# Patient Record
Sex: MALE | Race: WHITE | Employment: OTHER | URBAN - METROPOLITAN AREA
[De-identification: names, ages, dates, MRNs, and addresses within clinical notes are randomized per-mention and may not be internally consistent; named-entity substitution may affect disease eponyms.]

---

## 2017-01-01 ENCOUNTER — HOME CARE VISIT (OUTPATIENT)
Dept: HOME HEALTH SERVICES | Facility: HOME HEALTH | Age: 82
End: 2017-01-01
Payer: MEDICARE

## 2017-01-01 ENCOUNTER — HOSPITAL ENCOUNTER (OUTPATIENT)
Dept: INFUSION THERAPY | Age: 82
Discharge: HOME | End: 2017-01-21
Payer: MEDICARE

## 2017-01-01 ENCOUNTER — DOCUMENTATION ONLY (OUTPATIENT)
Dept: HEMATOLOGY | Age: 82
End: 2017-01-01

## 2017-01-01 ENCOUNTER — PATIENT OUTREACH (OUTPATIENT)
Dept: CASE MANAGEMENT | Age: 82
End: 2017-01-01

## 2017-01-01 ENCOUNTER — HOSPITAL ENCOUNTER (OUTPATIENT)
Dept: INFUSION THERAPY | Age: 82
Discharge: HOME | End: 2017-02-12
Payer: MEDICARE

## 2017-01-01 ENCOUNTER — HOSPITAL ENCOUNTER (INPATIENT)
Age: 82
LOS: 7 days | Discharge: HOME HEALTH | DRG: 374 | End: 2017-02-11
Attending: INTERNAL MEDICINE | Admitting: INTERNAL MEDICINE
Payer: MEDICARE

## 2017-01-01 ENCOUNTER — HOME HEALTH ADMISSION (OUTPATIENT)
Dept: HOME HEALTH SERVICES | Facility: HOME HEALTH | Age: 82
End: 2017-01-01
Payer: MEDICARE

## 2017-01-01 ENCOUNTER — HOSPITAL ENCOUNTER (OUTPATIENT)
Dept: LAB | Age: 82
Discharge: HOME | End: 2017-01-20
Payer: MEDICARE

## 2017-01-01 ENCOUNTER — HOSPITAL ENCOUNTER (OUTPATIENT)
Dept: INTERVENTIONAL RADIOLOGY/VASCULAR | Age: 82
Discharge: HOME | End: 2017-02-13
Payer: MEDICARE

## 2017-01-01 ENCOUNTER — HOME CARE VISIT (OUTPATIENT)
Dept: SCHEDULING | Facility: HOME HEALTH | Age: 82
End: 2017-01-01
Payer: MEDICARE

## 2017-01-01 ENCOUNTER — APPOINTMENT (OUTPATIENT)
Dept: CT IMAGING | Age: 82
DRG: 374 | End: 2017-01-01
Attending: RADIOLOGY
Payer: MEDICARE

## 2017-01-01 ENCOUNTER — HOSPITAL ENCOUNTER (INPATIENT)
Age: 82
LOS: 3 days | Discharge: HOSPICE | DRG: 435 | End: 2017-02-24
Attending: INTERNAL MEDICINE | Admitting: INTERNAL MEDICINE
Payer: MEDICARE

## 2017-01-01 ENCOUNTER — APPOINTMENT (OUTPATIENT)
Dept: GENERAL RADIOLOGY | Age: 82
DRG: 374 | End: 2017-01-01
Attending: INTERNAL MEDICINE
Payer: MEDICARE

## 2017-01-01 ENCOUNTER — ANESTHESIA (OUTPATIENT)
Dept: ENDOSCOPY | Age: 82
DRG: 374 | End: 2017-01-01
Payer: MEDICARE

## 2017-01-01 ENCOUNTER — APPOINTMENT (OUTPATIENT)
Dept: INTERVENTIONAL RADIOLOGY/VASCULAR | Age: 82
DRG: 374 | End: 2017-01-01
Attending: RADIOLOGY
Payer: MEDICARE

## 2017-01-01 ENCOUNTER — HOSPITAL ENCOUNTER (OUTPATIENT)
Dept: INFUSION THERAPY | Age: 82
Discharge: HOME | End: 2017-01-02
Payer: MEDICARE

## 2017-01-01 ENCOUNTER — HOSPITAL ENCOUNTER (OUTPATIENT)
Dept: INFUSION THERAPY | Age: 82
Discharge: HOME | End: 2017-01-20
Payer: MEDICARE

## 2017-01-01 ENCOUNTER — APPOINTMENT (OUTPATIENT)
Dept: INFUSION THERAPY | Age: 82
End: 2017-01-01

## 2017-01-01 ENCOUNTER — HOSPITAL ENCOUNTER (OUTPATIENT)
Dept: INFUSION THERAPY | Age: 82
End: 2017-01-01

## 2017-01-01 ENCOUNTER — APPOINTMENT (OUTPATIENT)
Dept: CT IMAGING | Age: 82
DRG: 374 | End: 2017-01-01
Attending: INTERNAL MEDICINE
Payer: MEDICARE

## 2017-01-01 ENCOUNTER — HOSPITAL ENCOUNTER (INPATIENT)
Age: 82
LOS: 2 days | End: 2017-02-26
Attending: INTERNAL MEDICINE | Admitting: INTERNAL MEDICINE

## 2017-01-01 ENCOUNTER — ANESTHESIA (OUTPATIENT)
Dept: SURGERY | Age: 82
DRG: 435 | End: 2017-01-01
Payer: MEDICARE

## 2017-01-01 ENCOUNTER — HOSPITAL ENCOUNTER (OUTPATIENT)
Dept: LAB | Age: 82
Discharge: HOME | DRG: 374 | End: 2017-02-03
Payer: MEDICARE

## 2017-01-01 ENCOUNTER — ANESTHESIA EVENT (OUTPATIENT)
Dept: ENDOSCOPY | Age: 82
DRG: 374 | End: 2017-01-01
Payer: MEDICARE

## 2017-01-01 ENCOUNTER — ANESTHESIA EVENT (OUTPATIENT)
Dept: SURGERY | Age: 82
DRG: 435 | End: 2017-01-01
Payer: MEDICARE

## 2017-01-01 ENCOUNTER — APPOINTMENT (OUTPATIENT)
Dept: GENERAL RADIOLOGY | Age: 82
DRG: 435 | End: 2017-01-01
Attending: NURSE PRACTITIONER
Payer: MEDICARE

## 2017-01-01 ENCOUNTER — HOSPITAL ENCOUNTER (OUTPATIENT)
Dept: LAB | Age: 82
Discharge: HOME | DRG: 435 | End: 2017-02-15
Payer: MEDICARE

## 2017-01-01 ENCOUNTER — APPOINTMENT (OUTPATIENT)
Dept: NUCLEAR MEDICINE | Age: 82
DRG: 374 | End: 2017-01-01
Attending: NURSE PRACTITIONER
Payer: MEDICARE

## 2017-01-01 ENCOUNTER — HOSPITAL ENCOUNTER (INPATIENT)
Dept: INTERVENTIONAL RADIOLOGY/VASCULAR | Age: 82
LOS: 1 days | Discharge: HOME | DRG: 435 | End: 2017-02-18
Attending: INTERNAL MEDICINE | Admitting: INTERNAL MEDICINE
Payer: MEDICARE

## 2017-01-01 ENCOUNTER — HOSPITAL ENCOUNTER (OUTPATIENT)
Dept: INTERVENTIONAL RADIOLOGY/VASCULAR | Age: 82
Discharge: HOME | End: 2017-02-14

## 2017-01-01 ENCOUNTER — HOSPICE ADMISSION (OUTPATIENT)
Dept: HOSPICE | Facility: HOSPICE | Age: 82
End: 2017-01-01
Payer: MEDICARE

## 2017-01-01 ENCOUNTER — HOSPITAL ENCOUNTER (OUTPATIENT)
Dept: INFUSION THERAPY | Age: 82
Discharge: HOME | End: 2017-02-15
Payer: MEDICARE

## 2017-01-01 ENCOUNTER — HOSPITAL ENCOUNTER (OUTPATIENT)
Dept: INFUSION THERAPY | Age: 82
Discharge: HOME | End: 2017-01-05
Payer: MEDICARE

## 2017-01-01 ENCOUNTER — HOSPITAL ENCOUNTER (OUTPATIENT)
Dept: LAB | Age: 82
Discharge: HOME | End: 2017-01-05
Payer: MEDICARE

## 2017-01-01 ENCOUNTER — APPOINTMENT (OUTPATIENT)
Dept: INTERVENTIONAL RADIOLOGY/VASCULAR | Age: 82
DRG: 435 | End: 2017-01-01
Payer: MEDICARE

## 2017-01-01 VITALS
WEIGHT: 160.6 LBS | TEMPERATURE: 98.1 F | HEIGHT: 73 IN | DIASTOLIC BLOOD PRESSURE: 90 MMHG | HEART RATE: 99 BPM | RESPIRATION RATE: 16 BRPM | SYSTOLIC BLOOD PRESSURE: 145 MMHG | BODY MASS INDEX: 21.29 KG/M2 | OXYGEN SATURATION: 96 %

## 2017-01-01 VITALS
DIASTOLIC BLOOD PRESSURE: 80 MMHG | OXYGEN SATURATION: 99 % | RESPIRATION RATE: 20 BRPM | TEMPERATURE: 97.7 F | WEIGHT: 157 LBS | BODY MASS INDEX: 20.71 KG/M2 | SYSTOLIC BLOOD PRESSURE: 155 MMHG | HEART RATE: 89 BPM

## 2017-01-01 VITALS
RESPIRATION RATE: 18 BRPM | SYSTOLIC BLOOD PRESSURE: 152 MMHG | OXYGEN SATURATION: 95 % | HEART RATE: 60 BPM | DIASTOLIC BLOOD PRESSURE: 75 MMHG | WEIGHT: 162.2 LBS | TEMPERATURE: 97.7 F | BODY MASS INDEX: 21.4 KG/M2

## 2017-01-01 VITALS
SYSTOLIC BLOOD PRESSURE: 161 MMHG | RESPIRATION RATE: 24 BRPM | HEIGHT: 73 IN | HEART RATE: 122 BPM | TEMPERATURE: 97 F | DIASTOLIC BLOOD PRESSURE: 76 MMHG

## 2017-01-01 VITALS
TEMPERATURE: 98.2 F | DIASTOLIC BLOOD PRESSURE: 70 MMHG | WEIGHT: 159.8 LBS | HEART RATE: 68 BPM | RESPIRATION RATE: 16 BRPM | SYSTOLIC BLOOD PRESSURE: 122 MMHG | OXYGEN SATURATION: 100 % | BODY MASS INDEX: 21.08 KG/M2

## 2017-01-01 VITALS
HEIGHT: 73 IN | HEART RATE: 58 BPM | BODY MASS INDEX: 21.2 KG/M2 | TEMPERATURE: 97.4 F | DIASTOLIC BLOOD PRESSURE: 55 MMHG | WEIGHT: 160 LBS | RESPIRATION RATE: 18 BRPM | OXYGEN SATURATION: 97 % | SYSTOLIC BLOOD PRESSURE: 121 MMHG

## 2017-01-01 VITALS
DIASTOLIC BLOOD PRESSURE: 65 MMHG | HEART RATE: 64 BPM | RESPIRATION RATE: 18 BRPM | TEMPERATURE: 98.8 F | OXYGEN SATURATION: 99 % | SYSTOLIC BLOOD PRESSURE: 142 MMHG

## 2017-01-01 VITALS
RESPIRATION RATE: 18 BRPM | DIASTOLIC BLOOD PRESSURE: 70 MMHG | HEART RATE: 70 BPM | SYSTOLIC BLOOD PRESSURE: 128 MMHG | TEMPERATURE: 97 F

## 2017-01-01 VITALS
HEART RATE: 78 BPM | SYSTOLIC BLOOD PRESSURE: 140 MMHG | RESPIRATION RATE: 18 BRPM | DIASTOLIC BLOOD PRESSURE: 80 MMHG | OXYGEN SATURATION: 95 %

## 2017-01-01 VITALS — SYSTOLIC BLOOD PRESSURE: 100 MMHG | DIASTOLIC BLOOD PRESSURE: 70 MMHG

## 2017-01-01 VITALS — BODY MASS INDEX: 20.92 KG/M2 | HEIGHT: 73 IN

## 2017-01-01 DIAGNOSIS — C80.1 CANCER (HCC): ICD-10-CM

## 2017-01-01 DIAGNOSIS — C25.0 MALIGNANT NEOPLASM OF HEAD OF PANCREAS (HCC): ICD-10-CM

## 2017-01-01 DIAGNOSIS — K83.1 BILIARY OBSTRUCTION DUE TO CANCER (HCC): ICD-10-CM

## 2017-01-01 DIAGNOSIS — D70.1 CHEMOTHERAPY INDUCED NEUTROPENIA (HCC): ICD-10-CM

## 2017-01-01 DIAGNOSIS — R52 PAIN: ICD-10-CM

## 2017-01-01 DIAGNOSIS — R11.2 INTRACTABLE VOMITING WITH NAUSEA, UNSPECIFIED VOMITING TYPE: ICD-10-CM

## 2017-01-01 DIAGNOSIS — T45.1X5A CHEMOTHERAPY INDUCED NEUTROPENIA (HCC): ICD-10-CM

## 2017-01-01 DIAGNOSIS — E83.42 HYPOMAGNESEMIA: ICD-10-CM

## 2017-01-01 DIAGNOSIS — G47.00 INSOMNIA, UNSPECIFIED TYPE: ICD-10-CM

## 2017-01-01 DIAGNOSIS — R78.81 GRAM-POSITIVE BACTEREMIA: ICD-10-CM

## 2017-01-01 DIAGNOSIS — D50.9 IRON DEFICIENCY ANEMIA, UNSPECIFIED IRON DEFICIENCY ANEMIA TYPE: Chronic | ICD-10-CM

## 2017-01-01 DIAGNOSIS — E87.8 DISORDERS OF FLUID, ELECTROLYTE, AND ACID-BASE BALANCE: ICD-10-CM

## 2017-01-01 DIAGNOSIS — C78.7 PANCREATIC CANCER METASTASIZED TO LIVER (HCC): Chronic | ICD-10-CM

## 2017-01-01 DIAGNOSIS — C25.9 PANCREATIC CANCER METASTASIZED TO LIVER (HCC): Chronic | ICD-10-CM

## 2017-01-01 DIAGNOSIS — K75.0 HEPATIC ABSCESS: ICD-10-CM

## 2017-01-01 DIAGNOSIS — C25.9 PANCREATIC CANCER (HCC): ICD-10-CM

## 2017-01-01 DIAGNOSIS — C80.1 BILIARY OBSTRUCTION DUE TO CANCER (HCC): ICD-10-CM

## 2017-01-01 LAB
ABO + RH BLD: NORMAL
ALBUMIN SERPL BCP-MCNC: 1.4 G/DL (ref 3.2–4.6)
ALBUMIN SERPL BCP-MCNC: 1.4 G/DL (ref 3.2–4.6)
ALBUMIN SERPL BCP-MCNC: 1.5 G/DL (ref 3.2–4.6)
ALBUMIN SERPL BCP-MCNC: 1.6 G/DL (ref 3.2–4.6)
ALBUMIN SERPL BCP-MCNC: 1.7 G/DL (ref 3.2–4.6)
ALBUMIN SERPL BCP-MCNC: 1.7 G/DL (ref 3.2–4.6)
ALBUMIN SERPL BCP-MCNC: 1.8 G/DL (ref 3.2–4.6)
ALBUMIN SERPL BCP-MCNC: 1.8 G/DL (ref 3.2–4.6)
ALBUMIN SERPL BCP-MCNC: 1.9 G/DL (ref 3.2–4.6)
ALBUMIN SERPL BCP-MCNC: 2 G/DL (ref 3.2–4.6)
ALBUMIN SERPL BCP-MCNC: 2.1 G/DL (ref 3.2–4.6)
ALBUMIN SERPL BCP-MCNC: 2.2 G/DL (ref 3.2–4.6)
ALBUMIN SERPL BCP-MCNC: 2.2 G/DL (ref 3.2–4.6)
ALBUMIN SERPL BCP-MCNC: 2.4 G/DL (ref 3.2–4.6)
ALBUMIN SERPL BCP-MCNC: 2.5 G/DL (ref 3.2–4.6)
ALBUMIN SERPL BCP-MCNC: 2.5 G/DL (ref 3.2–4.6)
ALBUMIN/GLOB SERPL: 0.3 {RATIO} (ref 1.2–3.5)
ALBUMIN/GLOB SERPL: 0.3 {RATIO} (ref 1.2–3.5)
ALBUMIN/GLOB SERPL: 0.4 {RATIO} (ref 1.2–3.5)
ALBUMIN/GLOB SERPL: 0.5 {RATIO} (ref 1.2–3.5)
ALBUMIN/GLOB SERPL: 0.6 {RATIO} (ref 1.2–3.5)
ALP SERPL-CCNC: 1052 U/L (ref 50–136)
ALP SERPL-CCNC: 270 U/L (ref 50–136)
ALP SERPL-CCNC: 277 U/L (ref 50–136)
ALP SERPL-CCNC: 394 U/L (ref 50–136)
ALP SERPL-CCNC: 419 U/L (ref 50–136)
ALP SERPL-CCNC: 451 U/L (ref 50–136)
ALP SERPL-CCNC: 515 U/L (ref 50–136)
ALP SERPL-CCNC: 540 U/L (ref 50–136)
ALP SERPL-CCNC: 549 U/L (ref 50–136)
ALP SERPL-CCNC: 553 U/L (ref 50–136)
ALP SERPL-CCNC: 560 U/L (ref 50–136)
ALP SERPL-CCNC: 568 U/L (ref 50–136)
ALP SERPL-CCNC: 679 U/L (ref 50–136)
ALP SERPL-CCNC: 708 U/L (ref 50–136)
ALP SERPL-CCNC: 767 U/L (ref 50–136)
ALP SERPL-CCNC: 809 U/L (ref 50–136)
ALP SERPL-CCNC: 833 U/L (ref 50–136)
ALP SERPL-CCNC: 903 U/L (ref 50–136)
ALP SERPL-CCNC: 927 U/L (ref 50–136)
ALP SERPL-CCNC: 987 U/L (ref 50–136)
ALT SERPL-CCNC: 21 U/L (ref 12–65)
ALT SERPL-CCNC: 23 U/L (ref 12–65)
ALT SERPL-CCNC: 24 U/L (ref 12–65)
ALT SERPL-CCNC: 25 U/L (ref 12–65)
ALT SERPL-CCNC: 25 U/L (ref 12–65)
ALT SERPL-CCNC: 29 U/L (ref 12–65)
ALT SERPL-CCNC: 32 U/L (ref 12–65)
ALT SERPL-CCNC: 33 U/L (ref 12–65)
ALT SERPL-CCNC: 35 U/L (ref 12–65)
ALT SERPL-CCNC: 35 U/L (ref 12–65)
ALT SERPL-CCNC: 37 U/L (ref 12–65)
ALT SERPL-CCNC: 39 U/L (ref 12–65)
ALT SERPL-CCNC: 42 U/L (ref 12–65)
ALT SERPL-CCNC: 49 U/L (ref 12–65)
ALT SERPL-CCNC: 55 U/L (ref 12–65)
ALT SERPL-CCNC: 63 U/L (ref 12–65)
ALT SERPL-CCNC: 75 U/L (ref 12–65)
ALT SERPL-CCNC: 89 U/L (ref 12–65)
ANION GAP BLD CALC-SCNC: 10 MMOL/L (ref 7–16)
ANION GAP BLD CALC-SCNC: 11 MMOL/L (ref 7–16)
ANION GAP BLD CALC-SCNC: 14 MMOL/L (ref 7–16)
ANION GAP BLD CALC-SCNC: 6 MMOL/L (ref 7–16)
ANION GAP BLD CALC-SCNC: 6 MMOL/L (ref 7–16)
ANION GAP BLD CALC-SCNC: 9 MMOL/L (ref 7–16)
APPEARANCE FLD: NORMAL
APPEARANCE UR: CLEAR
APTT PPP: 25.7 SEC (ref 23.5–31.7)
APTT PPP: 32.8 SEC (ref 23.5–31.7)
AST SERPL W P-5'-P-CCNC: 18 U/L (ref 15–37)
AST SERPL W P-5'-P-CCNC: 23 U/L (ref 15–37)
AST SERPL W P-5'-P-CCNC: 23 U/L (ref 15–37)
AST SERPL W P-5'-P-CCNC: 25 U/L (ref 15–37)
AST SERPL W P-5'-P-CCNC: 26 U/L (ref 15–37)
AST SERPL W P-5'-P-CCNC: 28 U/L (ref 15–37)
AST SERPL W P-5'-P-CCNC: 31 U/L (ref 15–37)
AST SERPL W P-5'-P-CCNC: 35 U/L (ref 15–37)
AST SERPL W P-5'-P-CCNC: 38 U/L (ref 15–37)
AST SERPL W P-5'-P-CCNC: 39 U/L (ref 15–37)
AST SERPL W P-5'-P-CCNC: 40 U/L (ref 15–37)
AST SERPL W P-5'-P-CCNC: 40 U/L (ref 15–37)
AST SERPL W P-5'-P-CCNC: 44 U/L (ref 15–37)
AST SERPL W P-5'-P-CCNC: 47 U/L (ref 15–37)
AST SERPL W P-5'-P-CCNC: 51 U/L (ref 15–37)
AST SERPL W P-5'-P-CCNC: 51 U/L (ref 15–37)
AST SERPL W P-5'-P-CCNC: 59 U/L (ref 15–37)
AST SERPL W P-5'-P-CCNC: 60 U/L (ref 15–37)
AST SERPL W P-5'-P-CCNC: 88 U/L (ref 15–37)
AST SERPL W P-5'-P-CCNC: 89 U/L (ref 15–37)
ATRIAL RATE: 65 BPM
BACTERIA SPEC CULT: ABNORMAL
BACTERIA SPEC CULT: NEGATIVE
BACTERIA SPEC CULT: NORMAL
BACTERIA URNS QL MICRO: 0 /HPF
BASOPHILS # BLD AUTO: 0 K/UL (ref 0–0.2)
BASOPHILS # BLD AUTO: 0.1 K/UL (ref 0–0.2)
BASOPHILS # BLD: 0 % (ref 0–2)
BILIRUB DIRECT SERPL-MCNC: 0.6 MG/DL
BILIRUB DIRECT SERPL-MCNC: 3.4 MG/DL
BILIRUB INDIRECT SERPL-MCNC: 0.8 MG/DL (ref 0–1.1)
BILIRUB SERPL-MCNC: 0.4 MG/DL (ref 0.2–1.1)
BILIRUB SERPL-MCNC: 0.5 MG/DL (ref 0.2–1.1)
BILIRUB SERPL-MCNC: 0.7 MG/DL (ref 0.2–1.1)
BILIRUB SERPL-MCNC: 0.7 MG/DL (ref 0.2–1.1)
BILIRUB SERPL-MCNC: 0.9 MG/DL (ref 0.2–1.1)
BILIRUB SERPL-MCNC: 0.9 MG/DL (ref 0.2–1.1)
BILIRUB SERPL-MCNC: 1.1 MG/DL (ref 0.2–1.1)
BILIRUB SERPL-MCNC: 1.2 MG/DL (ref 0.2–1.1)
BILIRUB SERPL-MCNC: 1.2 MG/DL (ref 0.2–1.1)
BILIRUB SERPL-MCNC: 1.5 MG/DL (ref 0.2–1.1)
BILIRUB SERPL-MCNC: 1.6 MG/DL (ref 0.2–1.1)
BILIRUB SERPL-MCNC: 1.6 MG/DL (ref 0.2–1.1)
BILIRUB SERPL-MCNC: 1.8 MG/DL (ref 0.2–1.1)
BILIRUB SERPL-MCNC: 2 MG/DL (ref 0.2–1.1)
BILIRUB SERPL-MCNC: 2.1 MG/DL (ref 0.2–1.1)
BILIRUB SERPL-MCNC: 3.2 MG/DL (ref 0.2–1.1)
BILIRUB SERPL-MCNC: 4.2 MG/DL (ref 0.2–1.1)
BILIRUB SERPL-MCNC: 4.2 MG/DL (ref 0.2–1.1)
BILIRUB SERPL-MCNC: 6.1 MG/DL (ref 0.2–1.1)
BILIRUB UR QL: NEGATIVE
BLD PROD TYP BPU: NORMAL
BLOOD BANK CMNT PATIENT-IMP: NORMAL
BLOOD GROUP ANTIBODIES SERPL: NORMAL
BPU ID: NORMAL
BUN SERPL-MCNC: 18 MG/DL (ref 8–23)
BUN SERPL-MCNC: 18 MG/DL (ref 8–23)
BUN SERPL-MCNC: 19 MG/DL (ref 8–23)
BUN SERPL-MCNC: 20 MG/DL (ref 8–23)
BUN SERPL-MCNC: 22 MG/DL (ref 8–23)
BUN SERPL-MCNC: 24 MG/DL (ref 8–23)
BUN SERPL-MCNC: 27 MG/DL (ref 8–23)
BUN SERPL-MCNC: 27 MG/DL (ref 8–23)
BUN SERPL-MCNC: 29 MG/DL (ref 8–23)
BUN SERPL-MCNC: 29 MG/DL (ref 8–23)
BUN SERPL-MCNC: 30 MG/DL (ref 8–23)
BUN SERPL-MCNC: 30 MG/DL (ref 8–23)
BUN SERPL-MCNC: 31 MG/DL (ref 8–23)
BUN SERPL-MCNC: 31 MG/DL (ref 8–23)
BUN SERPL-MCNC: 32 MG/DL (ref 8–23)
BUN SERPL-MCNC: 32 MG/DL (ref 8–23)
BUN SERPL-MCNC: 33 MG/DL (ref 8–23)
BUN SERPL-MCNC: 33 MG/DL (ref 8–23)
BUN SERPL-MCNC: 34 MG/DL (ref 8–23)
BUN SERPL-MCNC: 35 MG/DL (ref 8–23)
BUN SERPL-MCNC: 37 MG/DL (ref 8–23)
CALCIUM SERPL-MCNC: 6.4 MG/DL (ref 8.3–10.4)
CALCIUM SERPL-MCNC: 7.6 MG/DL (ref 8.3–10.4)
CALCIUM SERPL-MCNC: 7.8 MG/DL (ref 8.3–10.4)
CALCIUM SERPL-MCNC: 7.8 MG/DL (ref 8.3–10.4)
CALCIUM SERPL-MCNC: 7.9 MG/DL (ref 8.3–10.4)
CALCIUM SERPL-MCNC: 7.9 MG/DL (ref 8.3–10.4)
CALCIUM SERPL-MCNC: 8 MG/DL (ref 8.3–10.4)
CALCIUM SERPL-MCNC: 8.1 MG/DL (ref 8.3–10.4)
CALCIUM SERPL-MCNC: 8.1 MG/DL (ref 8.3–10.4)
CALCIUM SERPL-MCNC: 8.2 MG/DL (ref 8.3–10.4)
CALCIUM SERPL-MCNC: 8.3 MG/DL (ref 8.3–10.4)
CALCIUM SERPL-MCNC: 8.5 MG/DL (ref 8.3–10.4)
CALCIUM SERPL-MCNC: 8.6 MG/DL (ref 8.3–10.4)
CALCIUM SERPL-MCNC: 8.6 MG/DL (ref 8.3–10.4)
CALCIUM SERPL-MCNC: 8.7 MG/DL (ref 8.3–10.4)
CALCIUM SERPL-MCNC: 8.8 MG/DL (ref 8.3–10.4)
CALCIUM SERPL-MCNC: 8.9 MG/DL (ref 8.3–10.4)
CALCULATED P AXIS, ECG09: 42 DEGREES
CALCULATED R AXIS, ECG10: 41 DEGREES
CALCULATED T AXIS, ECG11: 61 DEGREES
CANCER AG19-9 SERPL-ACNC: 5991.9 U/ML (ref 2–37)
CANCER AG19-9 SERPL-ACNC: 7458.8 U/ML (ref 2–37)
CANCER AG19-9 SERPL-ACNC: 8493 U/ML (ref 2–37)
CANCER AG19-9 SERPL-ACNC: ABNORMAL U/ML (ref 2–37)
CASTS URNS QL MICRO: ABNORMAL /LPF
CHLORIDE SERPL-SCNC: 105 MMOL/L (ref 98–107)
CHLORIDE SERPL-SCNC: 106 MMOL/L (ref 98–107)
CHLORIDE SERPL-SCNC: 107 MMOL/L (ref 98–107)
CHLORIDE SERPL-SCNC: 108 MMOL/L (ref 98–107)
CHLORIDE SERPL-SCNC: 109 MMOL/L (ref 98–107)
CHLORIDE SERPL-SCNC: 110 MMOL/L (ref 98–107)
CHLORIDE SERPL-SCNC: 111 MMOL/L (ref 98–107)
CHLORIDE SERPL-SCNC: 112 MMOL/L (ref 98–107)
CHLORIDE SERPL-SCNC: 113 MMOL/L (ref 98–107)
CHLORIDE SERPL-SCNC: 113 MMOL/L (ref 98–107)
CO2 SERPL-SCNC: 20 MMOL/L (ref 21–32)
CO2 SERPL-SCNC: 21 MMOL/L (ref 21–32)
CO2 SERPL-SCNC: 22 MMOL/L (ref 21–32)
CO2 SERPL-SCNC: 22 MMOL/L (ref 23–32)
CO2 SERPL-SCNC: 23 MMOL/L (ref 21–32)
CO2 SERPL-SCNC: 24 MMOL/L (ref 21–32)
CO2 SERPL-SCNC: 24 MMOL/L (ref 23–32)
CO2 SERPL-SCNC: 25 MMOL/L (ref 23–32)
CO2 SERPL-SCNC: 25 MMOL/L (ref 23–32)
COLOR FLD: YELLOW
COLOR UR: ABNORMAL
CREAT SERPL-MCNC: 0.71 MG/DL (ref 0.8–1.5)
CREAT SERPL-MCNC: 0.71 MG/DL (ref 0.8–1.5)
CREAT SERPL-MCNC: 0.74 MG/DL (ref 0.8–1.5)
CREAT SERPL-MCNC: 0.76 MG/DL (ref 0.8–1.5)
CREAT SERPL-MCNC: 0.79 MG/DL (ref 0.8–1.5)
CREAT SERPL-MCNC: 0.82 MG/DL (ref 0.8–1.5)
CREAT SERPL-MCNC: 0.84 MG/DL (ref 0.8–1.5)
CREAT SERPL-MCNC: 0.86 MG/DL (ref 0.8–1.5)
CREAT SERPL-MCNC: 0.94 MG/DL (ref 0.8–1.5)
CREAT SERPL-MCNC: 0.95 MG/DL (ref 0.8–1.5)
CREAT SERPL-MCNC: 0.96 MG/DL (ref 0.8–1.5)
CREAT SERPL-MCNC: 0.96 MG/DL (ref 0.8–1.5)
CREAT SERPL-MCNC: 0.99 MG/DL (ref 0.8–1.5)
CREAT SERPL-MCNC: 1 MG/DL (ref 0.8–1.5)
CREAT SERPL-MCNC: 1.01 MG/DL (ref 0.8–1.5)
CREAT SERPL-MCNC: 1.01 MG/DL (ref 0.8–1.5)
CREAT SERPL-MCNC: 1.03 MG/DL (ref 0.8–1.5)
CREAT SERPL-MCNC: 1.12 MG/DL (ref 0.8–1.5)
CREAT SERPL-MCNC: 1.14 MG/DL (ref 0.8–1.5)
CREAT SERPL-MCNC: 1.17 MG/DL (ref 0.8–1.5)
CREAT SERPL-MCNC: 1.19 MG/DL (ref 0.8–1.5)
CROSSMATCH RESULT,%XM: NORMAL
DIAGNOSIS, 93000: NORMAL
DIASTOLIC BP, ECG02: NORMAL MMHG
DIFFERENTIAL METHOD BLD: ABNORMAL
EOSINOPHIL # BLD: 0 K/UL (ref 0–0.8)
EOSINOPHIL # BLD: 0.1 K/UL (ref 0–0.8)
EOSINOPHIL # BLD: 0.2 K/UL (ref 0–0.8)
EOSINOPHIL NFR BLD MANUAL: 3 % (ref 1–8)
EOSINOPHIL NFR BLD: 0 % (ref 0.5–7.8)
EOSINOPHIL NFR BLD: 1 % (ref 0.5–7.8)
EPI CELLS #/AREA URNS HPF: 0 /HPF
ERYTHROCYTE [DISTWIDTH] IN BLOOD BY AUTOMATED COUNT: 17.8 % (ref 11.9–14.6)
ERYTHROCYTE [DISTWIDTH] IN BLOOD BY AUTOMATED COUNT: 17.9 % (ref 11.9–14.6)
ERYTHROCYTE [DISTWIDTH] IN BLOOD BY AUTOMATED COUNT: 18.1 % (ref 11.9–14.6)
ERYTHROCYTE [DISTWIDTH] IN BLOOD BY AUTOMATED COUNT: 18.2 % (ref 11.9–14.6)
ERYTHROCYTE [DISTWIDTH] IN BLOOD BY AUTOMATED COUNT: 18.3 % (ref 11.9–14.6)
ERYTHROCYTE [DISTWIDTH] IN BLOOD BY AUTOMATED COUNT: 18.3 % (ref 11.9–14.6)
ERYTHROCYTE [DISTWIDTH] IN BLOOD BY AUTOMATED COUNT: 18.4 % (ref 11.9–14.6)
ERYTHROCYTE [DISTWIDTH] IN BLOOD BY AUTOMATED COUNT: 18.4 % (ref 11.9–14.6)
ERYTHROCYTE [DISTWIDTH] IN BLOOD BY AUTOMATED COUNT: 18.5 % (ref 11.9–14.6)
ERYTHROCYTE [DISTWIDTH] IN BLOOD BY AUTOMATED COUNT: 18.5 % (ref 11.9–14.6)
ERYTHROCYTE [DISTWIDTH] IN BLOOD BY AUTOMATED COUNT: 18.7 % (ref 11.9–14.6)
ERYTHROCYTE [DISTWIDTH] IN BLOOD BY AUTOMATED COUNT: 18.9 % (ref 11.9–14.6)
ERYTHROCYTE [DISTWIDTH] IN BLOOD BY AUTOMATED COUNT: 19 % (ref 11.9–14.6)
ERYTHROCYTE [DISTWIDTH] IN BLOOD BY AUTOMATED COUNT: 19.1 % (ref 11.9–14.6)
ERYTHROCYTE [DISTWIDTH] IN BLOOD BY AUTOMATED COUNT: 19.1 % (ref 11.9–14.6)
ERYTHROCYTE [DISTWIDTH] IN BLOOD BY AUTOMATED COUNT: 19.2 % (ref 11.9–14.6)
ERYTHROCYTE [DISTWIDTH] IN BLOOD BY AUTOMATED COUNT: 19.2 % (ref 11.9–14.6)
FOLATE BLD-MCNC: 363.6 NG/ML
FOLATE RBC-MCNC: 1547 NG/ML
GLOBULIN SER CALC-MCNC: 2.9 G/DL (ref 2.3–3.5)
GLOBULIN SER CALC-MCNC: 3.6 G/DL (ref 2.3–3.5)
GLOBULIN SER CALC-MCNC: 3.6 G/DL (ref 2.3–3.5)
GLOBULIN SER CALC-MCNC: 3.7 G/DL (ref 2.3–3.5)
GLOBULIN SER CALC-MCNC: 3.7 G/DL (ref 2.3–3.5)
GLOBULIN SER CALC-MCNC: 3.9 G/DL (ref 2.3–3.5)
GLOBULIN SER CALC-MCNC: 3.9 G/DL (ref 2.3–3.5)
GLOBULIN SER CALC-MCNC: 4 G/DL (ref 2.3–3.5)
GLOBULIN SER CALC-MCNC: 4.1 G/DL (ref 2.3–3.5)
GLOBULIN SER CALC-MCNC: 4.2 G/DL (ref 2.3–3.5)
GLOBULIN SER CALC-MCNC: 4.2 G/DL (ref 2.3–3.5)
GLOBULIN SER CALC-MCNC: 4.3 G/DL (ref 2.3–3.5)
GLOBULIN SER CALC-MCNC: 4.3 G/DL (ref 2.3–3.5)
GLOBULIN SER CALC-MCNC: 4.4 G/DL (ref 2.3–3.5)
GLOBULIN SER CALC-MCNC: 4.5 G/DL (ref 2.3–3.5)
GLOBULIN SER CALC-MCNC: 4.6 G/DL (ref 2.3–3.5)
GLUCOSE SERPL-MCNC: 102 MG/DL (ref 65–100)
GLUCOSE SERPL-MCNC: 102 MG/DL (ref 65–100)
GLUCOSE SERPL-MCNC: 107 MG/DL (ref 65–100)
GLUCOSE SERPL-MCNC: 108 MG/DL (ref 65–100)
GLUCOSE SERPL-MCNC: 109 MG/DL (ref 65–100)
GLUCOSE SERPL-MCNC: 127 MG/DL (ref 65–100)
GLUCOSE SERPL-MCNC: 132 MG/DL (ref 65–100)
GLUCOSE SERPL-MCNC: 139 MG/DL (ref 65–100)
GLUCOSE SERPL-MCNC: 141 MG/DL (ref 65–100)
GLUCOSE SERPL-MCNC: 144 MG/DL (ref 65–100)
GLUCOSE SERPL-MCNC: 158 MG/DL (ref 65–100)
GLUCOSE SERPL-MCNC: 161 MG/DL (ref 65–100)
GLUCOSE SERPL-MCNC: 161 MG/DL (ref 65–100)
GLUCOSE SERPL-MCNC: 162 MG/DL (ref 65–100)
GLUCOSE SERPL-MCNC: 169 MG/DL (ref 65–100)
GLUCOSE SERPL-MCNC: 169 MG/DL (ref 65–100)
GLUCOSE SERPL-MCNC: 213 MG/DL (ref 65–100)
GLUCOSE SERPL-MCNC: 76 MG/DL (ref 65–100)
GLUCOSE SERPL-MCNC: 79 MG/DL (ref 65–100)
GLUCOSE SERPL-MCNC: 85 MG/DL (ref 65–100)
GLUCOSE SERPL-MCNC: 93 MG/DL (ref 65–100)
GLUCOSE UR STRIP.AUTO-MCNC: 100 MG/DL
GRAM STN SPEC: ABNORMAL
GRAM STN SPEC: NORMAL
HCT VFR BLD AUTO: 23.5 % (ref 37.5–51)
HCT VFR BLD AUTO: 24.1 % (ref 41.1–50.3)
HCT VFR BLD AUTO: 24.6 % (ref 41.1–50.3)
HCT VFR BLD AUTO: 24.6 % (ref 41.1–50.3)
HCT VFR BLD AUTO: 25 % (ref 41.1–50.3)
HCT VFR BLD AUTO: 25.2 % (ref 41.1–50.3)
HCT VFR BLD AUTO: 26.3 % (ref 41.1–50.3)
HCT VFR BLD AUTO: 26.4 % (ref 41.1–50.3)
HCT VFR BLD AUTO: 27.3 % (ref 41.1–50.3)
HCT VFR BLD AUTO: 27.5 % (ref 41.1–50.3)
HCT VFR BLD AUTO: 27.6 % (ref 41.1–50.3)
HCT VFR BLD AUTO: 27.7 % (ref 41.1–50.3)
HCT VFR BLD AUTO: 28.2 % (ref 41.1–50.3)
HCT VFR BLD AUTO: 28.8 % (ref 41.1–50.3)
HCT VFR BLD AUTO: 29.8 % (ref 41.1–50.3)
HCT VFR BLD AUTO: 29.8 % (ref 41.1–50.3)
HCT VFR BLD AUTO: 30.6 % (ref 41.1–50.3)
HCT VFR BLD AUTO: 31.3 % (ref 41.1–50.3)
HCT VFR BLD AUTO: 32.6 % (ref 41.1–50.3)
HCT VFR BLD AUTO: 32.6 % (ref 41.1–50.3)
HCT VFR BLD AUTO: 33.6 % (ref 41.1–50.3)
HGB BLD-MCNC: 10 G/DL (ref 13.6–17.2)
HGB BLD-MCNC: 10 G/DL (ref 13.6–17.2)
HGB BLD-MCNC: 10.3 G/DL (ref 13.6–17.2)
HGB BLD-MCNC: 10.6 G/DL (ref 13.6–17.2)
HGB BLD-MCNC: 11 G/DL (ref 13.6–17.2)
HGB BLD-MCNC: 6.6 G/DL (ref 13.6–17.2)
HGB BLD-MCNC: 7.8 G/DL (ref 13.6–17.2)
HGB BLD-MCNC: 7.9 G/DL (ref 13.6–17.2)
HGB BLD-MCNC: 7.9 G/DL (ref 13.6–17.2)
HGB BLD-MCNC: 8 G/DL (ref 13.6–17.2)
HGB BLD-MCNC: 8.1 G/DL (ref 13.6–17.2)
HGB BLD-MCNC: 8.6 G/DL (ref 13.6–17.2)
HGB BLD-MCNC: 8.7 G/DL (ref 13.6–17.2)
HGB BLD-MCNC: 9 G/DL (ref 13.6–17.2)
HGB BLD-MCNC: 9.1 G/DL (ref 13.6–17.2)
HGB BLD-MCNC: 9.2 G/DL (ref 13.6–17.2)
HGB BLD-MCNC: 9.4 G/DL (ref 13.6–17.2)
HGB BLD-MCNC: 9.9 G/DL (ref 13.6–17.2)
HGB BLD-MCNC: 9.9 G/DL (ref 13.6–17.2)
HGB UR QL STRIP: ABNORMAL
IMM GRANULOCYTES # BLD: 0 K/UL (ref 0–0.5)
IMM GRANULOCYTES # BLD: 0 K/UL (ref 0–0.5)
IMM GRANULOCYTES # BLD: 0.1 K/UL (ref 0–0.5)
IMM GRANULOCYTES NFR BLD AUTO: 0.3 % (ref 0–5)
IMM GRANULOCYTES NFR BLD AUTO: 0.3 % (ref 0–5)
IMM GRANULOCYTES NFR BLD AUTO: 0.5 % (ref 0–5)
IMM GRANULOCYTES NFR BLD AUTO: 0.6 % (ref 0–5)
IMM GRANULOCYTES NFR BLD AUTO: 0.7 % (ref 0–5)
IMM GRANULOCYTES NFR BLD AUTO: 0.7 % (ref 0–5)
IMM GRANULOCYTES NFR BLD AUTO: 0.8 % (ref 0–5)
IMM GRANULOCYTES NFR BLD AUTO: 0.9 % (ref 0–5)
IMM GRANULOCYTES NFR BLD AUTO: 1 % (ref 0–5)
IMM GRANULOCYTES NFR BLD AUTO: 1.1 % (ref 0–5)
INR PPP: 1.1 (ref 0.9–1.2)
INR PPP: 1.1 (ref 0.9–1.2)
INR PPP: 1.2 (ref 0.9–1.2)
KETONES UR QL STRIP.AUTO: NEGATIVE MG/DL
LEUKOCYTE ESTERASE UR QL STRIP.AUTO: NEGATIVE
LYMPHOCYTES # BLD AUTO: 10 % (ref 13–44)
LYMPHOCYTES # BLD AUTO: 12 % (ref 13–44)
LYMPHOCYTES # BLD AUTO: 3 % (ref 13–44)
LYMPHOCYTES # BLD AUTO: 4 % (ref 13–44)
LYMPHOCYTES # BLD AUTO: 5 % (ref 13–44)
LYMPHOCYTES # BLD AUTO: 6 % (ref 13–44)
LYMPHOCYTES # BLD AUTO: 7 % (ref 13–44)
LYMPHOCYTES # BLD AUTO: 8 % (ref 13–44)
LYMPHOCYTES # BLD AUTO: 9 % (ref 13–44)
LYMPHOCYTES # BLD: 0.3 K/UL (ref 0.5–4.6)
LYMPHOCYTES # BLD: 0.4 K/UL (ref 0.5–4.6)
LYMPHOCYTES # BLD: 0.4 K/UL (ref 0.5–4.6)
LYMPHOCYTES # BLD: 0.5 K/UL (ref 0.5–4.6)
LYMPHOCYTES # BLD: 0.5 K/UL (ref 0.5–4.6)
LYMPHOCYTES # BLD: 0.6 K/UL (ref 0.5–4.6)
LYMPHOCYTES # BLD: 0.8 K/UL (ref 0.5–4.6)
LYMPHOCYTES # BLD: 0.9 K/UL (ref 0.5–4.6)
LYMPHOCYTES # BLD: 1.1 K/UL (ref 0.5–4.6)
LYMPHOCYTES # BLD: 1.2 K/UL (ref 0.5–4.6)
LYMPHOCYTES # BLD: 1.6 K/UL (ref 0.5–4.6)
LYMPHOCYTES NFR BLD MANUAL: 19 % (ref 16–44)
MAGNESIUM SERPL-MCNC: 1.9 MG/DL (ref 1.8–2.4)
MAGNESIUM SERPL-MCNC: 1.9 MG/DL (ref 1.8–2.4)
MAGNESIUM SERPL-MCNC: 2 MG/DL (ref 1.8–2.4)
MAGNESIUM SERPL-MCNC: 2.1 MG/DL (ref 1.8–2.4)
MAGNESIUM SERPL-MCNC: 2.2 MG/DL (ref 1.8–2.4)
MAGNESIUM SERPL-MCNC: 2.3 MG/DL (ref 1.8–2.4)
MCH RBC QN AUTO: 29.5 PG (ref 26.1–32.9)
MCH RBC QN AUTO: 29.7 PG (ref 26.1–32.9)
MCH RBC QN AUTO: 29.8 PG (ref 26.1–32.9)
MCH RBC QN AUTO: 29.8 PG (ref 26.1–32.9)
MCH RBC QN AUTO: 29.9 PG (ref 26.1–32.9)
MCH RBC QN AUTO: 30 PG (ref 26.1–32.9)
MCH RBC QN AUTO: 30.1 PG (ref 26.1–32.9)
MCH RBC QN AUTO: 30.3 PG (ref 26.1–32.9)
MCH RBC QN AUTO: 30.4 PG (ref 26.1–32.9)
MCH RBC QN AUTO: 30.5 PG (ref 26.1–32.9)
MCH RBC QN AUTO: 30.5 PG (ref 26.1–32.9)
MCH RBC QN AUTO: 30.9 PG (ref 26.1–32.9)
MCH RBC QN AUTO: 31.5 PG (ref 26.1–32.9)
MCH RBC QN AUTO: 31.8 PG (ref 26.1–32.9)
MCH RBC QN AUTO: 32 PG (ref 26.1–32.9)
MCHC RBC AUTO-ENTMCNC: 31.5 G/DL (ref 31.4–35)
MCHC RBC AUTO-ENTMCNC: 31.6 G/DL (ref 31.4–35)
MCHC RBC AUTO-ENTMCNC: 31.7 G/DL (ref 31.4–35)
MCHC RBC AUTO-ENTMCNC: 31.7 G/DL (ref 31.4–35)
MCHC RBC AUTO-ENTMCNC: 31.9 G/DL (ref 31.4–35)
MCHC RBC AUTO-ENTMCNC: 32.1 G/DL (ref 31.4–35)
MCHC RBC AUTO-ENTMCNC: 32.5 G/DL (ref 31.4–35)
MCHC RBC AUTO-ENTMCNC: 32.6 G/DL (ref 31.4–35)
MCHC RBC AUTO-ENTMCNC: 32.7 G/DL (ref 31.4–35)
MCHC RBC AUTO-ENTMCNC: 32.7 G/DL (ref 31.4–35)
MCHC RBC AUTO-ENTMCNC: 32.9 G/DL (ref 31.4–35)
MCHC RBC AUTO-ENTMCNC: 33.1 G/DL (ref 31.4–35)
MCHC RBC AUTO-ENTMCNC: 33.2 G/DL (ref 31.4–35)
MCHC RBC AUTO-ENTMCNC: 33.6 G/DL (ref 31.4–35)
MCHC RBC AUTO-ENTMCNC: 33.6 G/DL (ref 31.4–35)
MCV RBC AUTO: 100.7 FL (ref 79.6–97.8)
MCV RBC AUTO: 101.2 FL (ref 79.6–97.8)
MCV RBC AUTO: 89.5 FL (ref 79.6–97.8)
MCV RBC AUTO: 90.3 FL (ref 79.6–97.8)
MCV RBC AUTO: 90.8 FL (ref 79.6–97.8)
MCV RBC AUTO: 91.3 FL (ref 79.6–97.8)
MCV RBC AUTO: 92 FL (ref 79.6–97.8)
MCV RBC AUTO: 92 FL (ref 79.6–97.8)
MCV RBC AUTO: 92.1 FL (ref 79.6–97.8)
MCV RBC AUTO: 92.3 FL (ref 79.6–97.8)
MCV RBC AUTO: 92.8 FL (ref 79.6–97.8)
MCV RBC AUTO: 93.2 FL (ref 79.6–97.8)
MCV RBC AUTO: 93.4 FL (ref 79.6–97.8)
MCV RBC AUTO: 94.1 FL (ref 79.6–97.8)
MCV RBC AUTO: 94.4 FL (ref 79.6–97.8)
MCV RBC AUTO: 94.8 FL (ref 79.6–97.8)
MCV RBC AUTO: 95 FL (ref 79.6–97.8)
MCV RBC AUTO: 95.1 FL (ref 79.6–97.8)
MCV RBC AUTO: 95.6 FL (ref 79.6–97.8)
MCV RBC AUTO: 98.9 FL (ref 79.6–97.8)
MONOCYTES # BLD: 0.2 K/UL (ref 0.1–1.3)
MONOCYTES # BLD: 0.2 K/UL (ref 0.1–1.3)
MONOCYTES # BLD: 0.4 K/UL (ref 0.1–1.3)
MONOCYTES # BLD: 0.5 K/UL (ref 0.1–1.3)
MONOCYTES # BLD: 0.6 K/UL (ref 0.1–1.3)
MONOCYTES # BLD: 0.7 K/UL (ref 0.1–1.3)
MONOCYTES # BLD: 0.8 K/UL (ref 0.1–1.3)
MONOCYTES # BLD: 0.9 K/UL (ref 0.1–1.3)
MONOCYTES # BLD: 0.9 K/UL (ref 0.1–1.3)
MONOCYTES # BLD: 1 K/UL (ref 0.1–1.3)
MONOCYTES # BLD: 1.2 K/UL (ref 0.1–1.3)
MONOCYTES # BLD: 1.5 K/UL (ref 0.1–1.3)
MONOCYTES NFR BLD AUTO: 1 % (ref 4–12)
MONOCYTES NFR BLD AUTO: 10 % (ref 4–12)
MONOCYTES NFR BLD AUTO: 15 % (ref 4–12)
MONOCYTES NFR BLD AUTO: 2 % (ref 4–12)
MONOCYTES NFR BLD AUTO: 4 % (ref 4–12)
MONOCYTES NFR BLD AUTO: 5 % (ref 4–12)
MONOCYTES NFR BLD AUTO: 5 % (ref 4–12)
MONOCYTES NFR BLD AUTO: 6 % (ref 4–12)
MONOCYTES NFR BLD AUTO: 6 % (ref 4–12)
MONOCYTES NFR BLD AUTO: 7 % (ref 4–12)
MONOCYTES NFR BLD AUTO: 7 % (ref 4–12)
MONOCYTES NFR BLD AUTO: 8 % (ref 4–12)
MONOCYTES NFR BLD AUTO: 9 % (ref 4–12)
MONOCYTES NFR BLD MANUAL: 12 % (ref 3–9)
MYELOCYTES NFR BLD MANUAL: 2 %
NEUTS BAND NFR BLD MANUAL: 11 % (ref 0–6)
NEUTS SEG # BLD: 10 K/UL (ref 1.7–8.2)
NEUTS SEG # BLD: 10.5 K/UL (ref 1.7–8.2)
NEUTS SEG # BLD: 11.6 K/UL (ref 1.7–8.2)
NEUTS SEG # BLD: 14.2 K/UL (ref 1.7–8.2)
NEUTS SEG # BLD: 15.6 K/UL (ref 1.7–8.2)
NEUTS SEG # BLD: 16 K/UL (ref 1.7–8.2)
NEUTS SEG # BLD: 3.8 K/UL (ref 1.7–8.2)
NEUTS SEG # BLD: 4.8 K/UL (ref 1.7–8.2)
NEUTS SEG # BLD: 5.2 K/UL (ref 1.7–8.2)
NEUTS SEG # BLD: 7 K/UL (ref 1.7–8.2)
NEUTS SEG # BLD: 7 K/UL (ref 1.7–8.2)
NEUTS SEG # BLD: 7.5 K/UL (ref 1.7–8.2)
NEUTS SEG # BLD: 8.1 K/UL (ref 1.7–8.2)
NEUTS SEG # BLD: 9.5 K/UL (ref 1.7–8.2)
NEUTS SEG # BLD: 9.7 K/UL (ref 1.7–8.2)
NEUTS SEG # BLD: 9.8 K/UL (ref 1.7–8.2)
NEUTS SEG NFR BLD AUTO: 75 % (ref 43–78)
NEUTS SEG NFR BLD AUTO: 83 % (ref 43–78)
NEUTS SEG NFR BLD AUTO: 85 % (ref 43–78)
NEUTS SEG NFR BLD AUTO: 86 % (ref 43–78)
NEUTS SEG NFR BLD AUTO: 87 % (ref 43–78)
NEUTS SEG NFR BLD AUTO: 89 % (ref 43–78)
NEUTS SEG NFR BLD AUTO: 91 % (ref 43–78)
NEUTS SEG NFR BLD AUTO: 92 % (ref 43–78)
NEUTS SEG NFR BLD MANUAL: 53 % (ref 47–75)
NITRITE UR QL STRIP.AUTO: NEGATIVE
NRBC # BLD: 0 K/UL (ref 0–0.2)
NRBC # BLD: 0 K/UL (ref 0–0.2)
NRBC # BLD: 0.01 K/UL (ref 0–0.2)
NRBC # BLD: 0.02 K/UL (ref 0–0.2)
NUC CELL # FLD: <100 /CU MM
P-R INTERVAL, ECG05: 190 MS
PH UR STRIP: 5 [PH] (ref 5–9)
PHOSPHATE SERPL-MCNC: 2 MG/DL (ref 2.3–3.7)
PHOSPHATE SERPL-MCNC: 2.2 MG/DL (ref 2.3–3.7)
PHOSPHATE SERPL-MCNC: 2.4 MG/DL (ref 2.3–3.7)
PHOSPHATE SERPL-MCNC: 2.4 MG/DL (ref 2.3–3.7)
PHOSPHATE SERPL-MCNC: 2.6 MG/DL (ref 2.3–3.7)
PHOSPHATE SERPL-MCNC: 2.6 MG/DL (ref 2.3–3.7)
PLATELET # BLD AUTO: 105 K/UL (ref 150–450)
PLATELET # BLD AUTO: 106 K/UL (ref 150–450)
PLATELET # BLD AUTO: 106 K/UL (ref 150–450)
PLATELET # BLD AUTO: 108 K/UL (ref 150–450)
PLATELET # BLD AUTO: 112 K/UL (ref 150–450)
PLATELET # BLD AUTO: 113 K/UL (ref 150–450)
PLATELET # BLD AUTO: 113 K/UL (ref 150–450)
PLATELET # BLD AUTO: 115 K/UL (ref 150–450)
PLATELET # BLD AUTO: 116 K/UL (ref 150–450)
PLATELET # BLD AUTO: 118 K/UL (ref 150–450)
PLATELET # BLD AUTO: 136 K/UL (ref 150–450)
PLATELET # BLD AUTO: 138 K/UL (ref 150–450)
PLATELET # BLD AUTO: 141 K/UL (ref 150–450)
PLATELET # BLD AUTO: 147 K/UL (ref 150–450)
PLATELET # BLD AUTO: 160 K/UL (ref 150–450)
PLATELET # BLD AUTO: 177 K/UL (ref 150–450)
PLATELET # BLD AUTO: 193 K/UL (ref 150–450)
PLATELET # BLD AUTO: 193 K/UL (ref 150–450)
PLATELET # BLD AUTO: 74 K/UL (ref 150–450)
PLATELET # BLD AUTO: 83 K/UL (ref 150–450)
PLATELET COMMENTS,PCOM: ABNORMAL
PMV BLD AUTO: 10 FL (ref 10.8–14.1)
PMV BLD AUTO: 10.1 FL (ref 10.8–14.1)
PMV BLD AUTO: 10.1 FL (ref 10.8–14.1)
PMV BLD AUTO: 10.2 FL (ref 10.8–14.1)
PMV BLD AUTO: 10.3 FL (ref 10.8–14.1)
PMV BLD AUTO: 10.6 FL (ref 10.8–14.1)
PMV BLD AUTO: 10.7 FL (ref 10.8–14.1)
PMV BLD AUTO: 10.8 FL (ref 10.8–14.1)
PMV BLD AUTO: 10.9 FL (ref 10.8–14.1)
PMV BLD AUTO: 11 FL (ref 10.8–14.1)
PMV BLD AUTO: 9.7 FL (ref 10.8–14.1)
PMV BLD AUTO: 9.7 FL (ref 10.8–14.1)
PMV BLD AUTO: 9.9 FL (ref 10.8–14.1)
PMV BLD AUTO: 9.9 FL (ref 10.8–14.1)
POTASSIUM SERPL-SCNC: 3.3 MMOL/L (ref 3.5–5.1)
POTASSIUM SERPL-SCNC: 3.5 MMOL/L (ref 3.5–5.1)
POTASSIUM SERPL-SCNC: 3.6 MMOL/L (ref 3.5–5.1)
POTASSIUM SERPL-SCNC: 3.7 MMOL/L (ref 3.5–5.1)
POTASSIUM SERPL-SCNC: 3.7 MMOL/L (ref 3.5–5.1)
POTASSIUM SERPL-SCNC: 3.8 MMOL/L (ref 3.5–5.1)
POTASSIUM SERPL-SCNC: 3.9 MMOL/L (ref 3.5–5.1)
POTASSIUM SERPL-SCNC: 4 MMOL/L (ref 3.5–5.1)
POTASSIUM SERPL-SCNC: 4.1 MMOL/L (ref 3.5–5.1)
POTASSIUM SERPL-SCNC: 4.1 MMOL/L (ref 3.5–5.1)
POTASSIUM SERPL-SCNC: 4.2 MMOL/L (ref 3.5–5.1)
POTASSIUM SERPL-SCNC: 4.3 MMOL/L (ref 3.5–5.1)
POTASSIUM SERPL-SCNC: 4.3 MMOL/L (ref 3.5–5.1)
POTASSIUM SERPL-SCNC: 4.5 MMOL/L (ref 3.5–5.1)
PROT SERPL-MCNC: 4.5 G/DL (ref 6.3–8.2)
PROT SERPL-MCNC: 5.3 G/DL (ref 6.3–8.2)
PROT SERPL-MCNC: 5.6 G/DL (ref 6.3–8.2)
PROT SERPL-MCNC: 5.6 G/DL (ref 6.3–8.2)
PROT SERPL-MCNC: 5.7 G/DL (ref 6.3–8.2)
PROT SERPL-MCNC: 5.7 G/DL (ref 6.3–8.2)
PROT SERPL-MCNC: 5.8 G/DL (ref 6.3–8.2)
PROT SERPL-MCNC: 6.1 G/DL (ref 6.3–8.2)
PROT SERPL-MCNC: 6.1 G/DL (ref 6.3–8.2)
PROT SERPL-MCNC: 6.2 G/DL (ref 6.3–8.2)
PROT SERPL-MCNC: 6.5 G/DL (ref 6.3–8.2)
PROT SERPL-MCNC: 6.5 G/DL (ref 6.3–8.2)
PROT SERPL-MCNC: 6.7 G/DL (ref 6.3–8.2)
PROT SERPL-MCNC: 7 G/DL (ref 6.3–8.2)
PROT SERPL-MCNC: 7 G/DL (ref 6.3–8.2)
PROT UR STRIP-MCNC: 30 MG/DL
PROTHROMBIN TIME: 12.2 SEC (ref 9.6–12)
PROTHROMBIN TIME: 12.4 SEC (ref 9.6–12)
PROTHROMBIN TIME: 13.2 SEC (ref 9.6–12)
PSA SERPL-MCNC: 0.1 NG/ML
Q-T INTERVAL, ECG07: 422 MS
QRS DURATION, ECG06: 108 MS
QTC CALCULATION (BEZET), ECG08: 438 MS
RBC # BLD AUTO: 2.47 M/UL (ref 4.23–5.67)
RBC # BLD AUTO: 2.62 M/UL (ref 4.23–5.67)
RBC # BLD AUTO: 2.64 M/UL (ref 4.23–5.67)
RBC # BLD AUTO: 2.65 M/UL (ref 4.23–5.67)
RBC # BLD AUTO: 2.67 M/UL (ref 4.23–5.67)
RBC # BLD AUTO: 2.74 M/UL (ref 4.23–5.67)
RBC # BLD AUTO: 2.76 M/UL (ref 4.23–5.67)
RBC # BLD AUTO: 2.85 M/UL (ref 4.23–5.67)
RBC # BLD AUTO: 2.87 M/UL (ref 4.23–5.67)
RBC # BLD AUTO: 2.9 M/UL (ref 4.23–5.67)
RBC # BLD AUTO: 2.95 M/UL (ref 4.23–5.67)
RBC # BLD AUTO: 3.05 M/UL (ref 4.23–5.67)
RBC # BLD AUTO: 3.06 M/UL (ref 4.23–5.67)
RBC # BLD AUTO: 3.29 M/UL (ref 4.23–5.67)
RBC # BLD AUTO: 3.3 M/UL (ref 4.23–5.67)
RBC # BLD AUTO: 3.33 M/UL (ref 4.23–5.67)
RBC # BLD AUTO: 3.35 M/UL (ref 4.23–5.67)
RBC # BLD AUTO: 3.43 M/UL (ref 4.23–5.67)
RBC # BLD AUTO: 3.49 M/UL (ref 4.23–5.67)
RBC # BLD AUTO: 3.65 M/UL (ref 4.23–5.67)
RBC # FLD: NORMAL /CU MM
RBC #/AREA URNS HPF: ABNORMAL /HPF
RBC MORPH BLD: ABNORMAL
SERVICE CMNT-IMP: ABNORMAL
SERVICE CMNT-IMP: NORMAL
SODIUM SERPL-SCNC: 138 MMOL/L (ref 136–145)
SODIUM SERPL-SCNC: 139 MMOL/L (ref 136–145)
SODIUM SERPL-SCNC: 140 MMOL/L (ref 136–145)
SODIUM SERPL-SCNC: 140 MMOL/L (ref 136–145)
SODIUM SERPL-SCNC: 141 MMOL/L (ref 136–145)
SODIUM SERPL-SCNC: 142 MMOL/L (ref 136–145)
SODIUM SERPL-SCNC: 143 MMOL/L (ref 136–145)
SODIUM SERPL-SCNC: 144 MMOL/L (ref 136–145)
SODIUM SERPL-SCNC: 144 MMOL/L (ref 136–145)
SODIUM SERPL-SCNC: 145 MMOL/L (ref 136–145)
SODIUM SERPL-SCNC: 145 MMOL/L (ref 136–145)
SODIUM SERPL-SCNC: 146 MMOL/L (ref 136–145)
SODIUM SERPL-SCNC: 146 MMOL/L (ref 136–145)
SP GR UR REFRACTOMETRY: 1.02 (ref 1–1.02)
SPECIMEN EXP DATE BLD: NORMAL
SPECIMEN SOURCE FLD: NORMAL
STATUS OF UNIT,%ST: NORMAL
SYSTOLIC BP, ECG01: NORMAL MMHG
T3 SERPL-MCNC: 0.56 NG/ML (ref 0.6–1.81)
T4 FREE SERPL-MCNC: 1.1 NG/DL (ref 0.78–1.46)
TRIGL SERPL-MCNC: 103 MG/DL (ref 35–150)
TRIGL SERPL-MCNC: 109 MG/DL (ref 35–150)
TRIGL SERPL-MCNC: 90 MG/DL (ref 35–150)
TSH SERPL DL<=0.005 MIU/L-ACNC: 1.22 UIU/ML (ref 0.36–3.74)
UNIT DIVISION, %UDIV: 0
UROBILINOGEN UR QL STRIP.AUTO: 0.2 EU/DL (ref 0.2–1)
VANCOMYCIN TROUGH SERPL-MCNC: 11.6 UG/ML (ref 5–20)
VANCOMYCIN TROUGH SERPL-MCNC: 19.6 UG/ML (ref 5–20)
VENTRICULAR RATE, ECG03: 65 BPM
VIT B1 BLD-SCNC: 93.4 NMOL/L (ref 66.5–200)
VIT B12 SERPL-MCNC: 1293 PG/ML (ref 193–986)
VIT B12 SERPL-MCNC: 1416 PG/ML (ref 193–986)
WBC # BLD AUTO: 10.7 K/UL (ref 4.3–11.1)
WBC # BLD AUTO: 11.6 K/UL (ref 4.3–11.1)
WBC # BLD AUTO: 11.7 K/UL (ref 4.3–11.1)
WBC # BLD AUTO: 11.8 K/UL (ref 4.3–11.1)
WBC # BLD AUTO: 11.8 K/UL (ref 4.3–11.1)
WBC # BLD AUTO: 12 K/UL (ref 4.3–11.1)
WBC # BLD AUTO: 12.5 K/UL (ref 4.3–11.1)
WBC # BLD AUTO: 13.5 K/UL (ref 4.3–11.1)
WBC # BLD AUTO: 15.7 K/UL (ref 4.3–11.1)
WBC # BLD AUTO: 15.9 K/UL (ref 4.3–11.1)
WBC # BLD AUTO: 16.4 K/UL (ref 4.3–11.1)
WBC # BLD AUTO: 17.4 K/UL (ref 4.3–11.1)
WBC # BLD AUTO: 18.1 K/UL (ref 4.3–11.1)
WBC # BLD AUTO: 5.8 K/UL (ref 4.3–11.1)
WBC # BLD AUTO: 6 K/UL (ref 4.3–11.1)
WBC # BLD AUTO: 6.4 K/UL (ref 4.3–11.1)
WBC # BLD AUTO: 8.1 K/UL (ref 4.3–11.1)
WBC # BLD AUTO: 8.3 K/UL (ref 4.3–11.1)
WBC # BLD AUTO: 8.8 K/UL (ref 4.3–11.1)
WBC # BLD AUTO: 9.5 K/UL (ref 4.3–11.1)
WBC MORPH BLD: ABNORMAL
WBC URNS QL MICRO: ABNORMAL /HPF

## 2017-01-01 PROCEDURE — 74011250637 HC RX REV CODE- 250/637: Performed by: NURSE PRACTITIONER

## 2017-01-01 PROCEDURE — 74011250636 HC RX REV CODE- 250/636: Performed by: NURSE PRACTITIONER

## 2017-01-01 PROCEDURE — 77030008703 HC TU ET UNCUF COVD -A: Performed by: ANESTHESIOLOGY

## 2017-01-01 PROCEDURE — 0F764DZ DILATION OF LEFT HEPATIC DUCT WITH INTRALUMINAL DEVICE, PERCUTANEOUS ENDOSCOPIC APPROACH: ICD-10-PCS | Performed by: RADIOLOGY

## 2017-01-01 PROCEDURE — 74011250636 HC RX REV CODE- 250/636: Performed by: RADIOLOGY

## 2017-01-01 PROCEDURE — C1769 GUIDE WIRE: HCPCS

## 2017-01-01 PROCEDURE — 87040 BLOOD CULTURE FOR BACTERIA: CPT | Performed by: NURSE PRACTITIONER

## 2017-01-01 PROCEDURE — 3331090001 HH PPS REVENUE CREDIT

## 2017-01-01 PROCEDURE — 64420 NJX AA&/STRD NTRCOST NRV 1: CPT

## 2017-01-01 PROCEDURE — 74011000258 HC RX REV CODE- 258: Performed by: INTERNAL MEDICINE

## 2017-01-01 PROCEDURE — 76060000033 HC ANESTHESIA 1 TO 1.5 HR: Performed by: INTERNAL MEDICINE

## 2017-01-01 PROCEDURE — 77030009038 HC CATH BILI STN RTVR BSC -C: Performed by: INTERNAL MEDICINE

## 2017-01-01 PROCEDURE — 74011000250 HC RX REV CODE- 250: Performed by: RADIOLOGY

## 2017-01-01 PROCEDURE — 87186 SC STD MICRODIL/AGAR DIL: CPT | Performed by: INTERNAL MEDICINE

## 2017-01-01 PROCEDURE — 36591 DRAW BLOOD OFF VENOUS DEVICE: CPT

## 2017-01-01 PROCEDURE — 77030025702 HC BG URIN DRN MRTM -A

## 2017-01-01 PROCEDURE — 74011000250 HC RX REV CODE- 250: Performed by: NURSE PRACTITIONER

## 2017-01-01 PROCEDURE — 74011250637 HC RX REV CODE- 250/637: Performed by: INTERNAL MEDICINE

## 2017-01-01 PROCEDURE — 74011250636 HC RX REV CODE- 250/636: Performed by: INTERNAL MEDICINE

## 2017-01-01 PROCEDURE — 74011250636 HC RX REV CODE- 250/636

## 2017-01-01 PROCEDURE — 77030007288 HC DEV LOK BILI BSC -A: Performed by: INTERNAL MEDICINE

## 2017-01-01 PROCEDURE — C1894 INTRO/SHEATH, NON-LASER: HCPCS

## 2017-01-01 PROCEDURE — C1729 CATH, DRAINAGE: HCPCS

## 2017-01-01 PROCEDURE — 84439 ASSAY OF FREE THYROXINE: CPT | Performed by: INTERNAL MEDICINE

## 2017-01-01 PROCEDURE — G0299 HHS/HOSPICE OF RN EA 15 MIN: HCPCS

## 2017-01-01 PROCEDURE — 85018 HEMOGLOBIN: CPT | Performed by: INTERNAL MEDICINE

## 2017-01-01 PROCEDURE — 77030004561 HC CATH ANGI DX COBRA ANGI -B

## 2017-01-01 PROCEDURE — 74011000250 HC RX REV CODE- 250: Performed by: INTERNAL MEDICINE

## 2017-01-01 PROCEDURE — 65270000029 HC RM PRIVATE

## 2017-01-01 PROCEDURE — 87205 SMEAR GRAM STAIN: CPT | Performed by: INTERNAL MEDICINE

## 2017-01-01 PROCEDURE — 80053 COMPREHEN METABOLIC PANEL: CPT | Performed by: INTERNAL MEDICINE

## 2017-01-01 PROCEDURE — C9113 INJ PANTOPRAZOLE SODIUM, VIA: HCPCS | Performed by: INTERNAL MEDICINE

## 2017-01-01 PROCEDURE — 74011000258 HC RX REV CODE- 258: Performed by: NURSE PRACTITIONER

## 2017-01-01 PROCEDURE — 80048 BASIC METABOLIC PNL TOTAL CA: CPT | Performed by: NURSE PRACTITIONER

## 2017-01-01 PROCEDURE — C1725 CATH, TRANSLUMIN NON-LASER: HCPCS

## 2017-01-01 PROCEDURE — 96417 CHEMO IV INFUS EACH ADDL SEQ: CPT

## 2017-01-01 PROCEDURE — BF100ZZ FLUOROSCOPY OF BILE DUCTS USING HIGH OSMOLAR CONTRAST: ICD-10-PCS | Performed by: INTERNAL MEDICINE

## 2017-01-01 PROCEDURE — 83735 ASSAY OF MAGNESIUM: CPT | Performed by: NURSE PRACTITIONER

## 2017-01-01 PROCEDURE — 90686 IIV4 VACC NO PRSV 0.5 ML IM: CPT | Performed by: INTERNAL MEDICINE

## 2017-01-01 PROCEDURE — 85610 PROTHROMBIN TIME: CPT | Performed by: RADIOLOGY

## 2017-01-01 PROCEDURE — 86923 COMPATIBILITY TEST ELECTRIC: CPT | Performed by: NURSE PRACTITIONER

## 2017-01-01 PROCEDURE — 36430 TRANSFUSION BLD/BLD COMPNT: CPT

## 2017-01-01 PROCEDURE — 85025 COMPLETE CBC W/AUTO DIFF WBC: CPT | Performed by: NURSE PRACTITIONER

## 2017-01-01 PROCEDURE — 85730 THROMBOPLASTIN TIME PARTIAL: CPT | Performed by: RADIOLOGY

## 2017-01-01 PROCEDURE — 84100 ASSAY OF PHOSPHORUS: CPT | Performed by: NURSE PRACTITIONER

## 2017-01-01 PROCEDURE — 36415 COLL VENOUS BLD VENIPUNCTURE: CPT | Performed by: NURSE PRACTITIONER

## 2017-01-01 PROCEDURE — 86923 COMPATIBILITY TEST ELECTRIC: CPT | Performed by: INTERNAL MEDICINE

## 2017-01-01 PROCEDURE — 77030010507 HC ADH SKN DERMBND J&J -B

## 2017-01-01 PROCEDURE — 81001 URINALYSIS AUTO W/SCOPE: CPT | Performed by: NURSE PRACTITIONER

## 2017-01-01 PROCEDURE — 85610 PROTHROMBIN TIME: CPT | Performed by: INTERNAL MEDICINE

## 2017-01-01 PROCEDURE — 77030003560 HC NDL HUBR BARD -A

## 2017-01-01 PROCEDURE — 82040 ASSAY OF SERUM ALBUMIN: CPT | Performed by: NURSE PRACTITIONER

## 2017-01-01 PROCEDURE — 0F9630Z DRAINAGE OF LEFT HEPATIC DUCT WITH DRAINAGE DEVICE, PERCUTANEOUS APPROACH: ICD-10-PCS | Performed by: RADIOLOGY

## 2017-01-01 PROCEDURE — 49083 ABD PARACENTESIS W/IMAGING: CPT

## 2017-01-01 PROCEDURE — 74011636320 HC RX REV CODE- 636/320: Performed by: INTERNAL MEDICINE

## 2017-01-01 PROCEDURE — 77030006078 HC TAPE GLOW N TEL LEMV -B

## 2017-01-01 PROCEDURE — 94760 N-INVAS EAR/PLS OXIMETRY 1: CPT

## 2017-01-01 PROCEDURE — 76080 X-RAY EXAM OF FISTULA: CPT

## 2017-01-01 PROCEDURE — 77030032490 HC SLV COMPR SCD KNE COVD -B: Performed by: INTERNAL MEDICINE

## 2017-01-01 PROCEDURE — 99218 HC RM OBSERVATION: CPT

## 2017-01-01 PROCEDURE — 71010 XR CHEST PORT: CPT

## 2017-01-01 PROCEDURE — 93005 ELECTROCARDIOGRAM TRACING: CPT | Performed by: NURSE PRACTITIONER

## 2017-01-01 PROCEDURE — 77030013131 HC IV BLD ST ICUM -A

## 2017-01-01 PROCEDURE — 96413 CHEMO IV INFUSION 1 HR: CPT

## 2017-01-01 PROCEDURE — 80053 COMPREHEN METABOLIC PANEL: CPT | Performed by: NURSE PRACTITIONER

## 2017-01-01 PROCEDURE — C1769 GUIDE WIRE: HCPCS | Performed by: INTERNAL MEDICINE

## 2017-01-01 PROCEDURE — 86900 BLOOD TYPING SEROLOGIC ABO: CPT | Performed by: NURSE PRACTITIONER

## 2017-01-01 PROCEDURE — 77030013992 HC SNR POLYP ENDOSC BSC -B: Performed by: INTERNAL MEDICINE

## 2017-01-01 PROCEDURE — 87102 FUNGUS ISOLATION CULTURE: CPT | Performed by: INTERNAL MEDICINE

## 2017-01-01 PROCEDURE — 84478 ASSAY OF TRIGLYCERIDES: CPT | Performed by: NURSE PRACTITIONER

## 2017-01-01 PROCEDURE — 99152 MOD SED SAME PHYS/QHP 5/>YRS: CPT

## 2017-01-01 PROCEDURE — 96361 HYDRATE IV INFUSION ADD-ON: CPT

## 2017-01-01 PROCEDURE — 85025 COMPLETE CBC W/AUTO DIFF WBC: CPT | Performed by: INTERNAL MEDICINE

## 2017-01-01 PROCEDURE — 80202 ASSAY OF VANCOMYCIN: CPT | Performed by: INTERNAL MEDICINE

## 2017-01-01 PROCEDURE — 84443 ASSAY THYROID STIM HORMONE: CPT | Performed by: INTERNAL MEDICINE

## 2017-01-01 PROCEDURE — 84425 ASSAY OF VITAMIN B-1: CPT | Performed by: INTERNAL MEDICINE

## 2017-01-01 PROCEDURE — BF101ZZ FLUOROSCOPY OF BILE DUCTS USING LOW OSMOLAR CONTRAST: ICD-10-PCS | Performed by: RADIOLOGY

## 2017-01-01 PROCEDURE — 3331090002 HH PPS REVENUE DEBIT

## 2017-01-01 PROCEDURE — 85027 COMPLETE CBC AUTOMATED: CPT | Performed by: INTERNAL MEDICINE

## 2017-01-01 PROCEDURE — 82607 VITAMIN B-12: CPT | Performed by: INTERNAL MEDICINE

## 2017-01-01 PROCEDURE — 0656 HSPC GENERAL INPATIENT

## 2017-01-01 PROCEDURE — 0W3P8ZZ CONTROL BLEEDING IN GASTROINTESTINAL TRACT, VIA NATURAL OR ARTIFICIAL OPENING ENDOSCOPIC: ICD-10-PCS | Performed by: INTERNAL MEDICINE

## 2017-01-01 PROCEDURE — 99153 MOD SED SAME PHYS/QHP EA: CPT

## 2017-01-01 PROCEDURE — 87641 MR-STAPH DNA AMP PROBE: CPT | Performed by: RADIOLOGY

## 2017-01-01 PROCEDURE — 87641 MR-STAPH DNA AMP PROBE: CPT | Performed by: INTERNAL MEDICINE

## 2017-01-01 PROCEDURE — A9537 TC99M MEBROFENIN: HCPCS

## 2017-01-01 PROCEDURE — 76060000035 HC ANESTHESIA 2 TO 2.5 HR: Performed by: RADIOLOGY

## 2017-01-01 PROCEDURE — 85027 COMPLETE CBC AUTOMATED: CPT | Performed by: NURSE PRACTITIONER

## 2017-01-01 PROCEDURE — 76040000028: Performed by: INTERNAL MEDICINE

## 2017-01-01 PROCEDURE — 99232 SBSQ HOSP IP/OBS MODERATE 35: CPT | Performed by: INTERNAL MEDICINE

## 2017-01-01 PROCEDURE — 85025 COMPLETE CBC W/AUTO DIFF WBC: CPT | Performed by: RADIOLOGY

## 2017-01-01 PROCEDURE — 76040000027: Performed by: INTERNAL MEDICINE

## 2017-01-01 PROCEDURE — 0FPB8DZ REMOVAL OF INTRALUMINAL DEVICE FROM HEPATOBILIARY DUCT, VIA NATURAL OR ARTIFICIAL OPENING ENDOSCOPIC: ICD-10-PCS | Performed by: INTERNAL MEDICINE

## 2017-01-01 PROCEDURE — C1876 STENT, NON-COA/NON-COV W/DEL: HCPCS

## 2017-01-01 PROCEDURE — P9040 RBC LEUKOREDUCED IRRADIATED: HCPCS | Performed by: NURSE PRACTITIONER

## 2017-01-01 PROCEDURE — 86900 BLOOD TYPING SEROLOGIC ABO: CPT | Performed by: INTERNAL MEDICINE

## 2017-01-01 PROCEDURE — A6222 GAUZE <=16 IN NO W/SAL W/O B: HCPCS

## 2017-01-01 PROCEDURE — 89050 BODY FLUID CELL COUNT: CPT | Performed by: INTERNAL MEDICINE

## 2017-01-01 PROCEDURE — 87106 FUNGI IDENTIFICATION YEAST: CPT | Performed by: NURSE PRACTITIONER

## 2017-01-01 PROCEDURE — 77030012595 HC SPHNTOM BILI BSC -D: Performed by: INTERNAL MEDICINE

## 2017-01-01 PROCEDURE — P9040 RBC LEUKOREDUCED IRRADIATED: HCPCS | Performed by: INTERNAL MEDICINE

## 2017-01-01 PROCEDURE — 82248 BILIRUBIN DIRECT: CPT | Performed by: NURSE PRACTITIONER

## 2017-01-01 PROCEDURE — 77030027138 HC INCENT SPIROMETER -A

## 2017-01-01 PROCEDURE — 82607 VITAMIN B-12: CPT | Performed by: NURSE PRACTITIONER

## 2017-01-01 PROCEDURE — 77010033678 HC OXYGEN DAILY

## 2017-01-01 PROCEDURE — 87205 SMEAR GRAM STAIN: CPT | Performed by: NURSE PRACTITIONER

## 2017-01-01 PROCEDURE — 77030018667 ADMN ST IV BLD FENW -A

## 2017-01-01 PROCEDURE — 74011000250 HC RX REV CODE- 250

## 2017-01-01 PROCEDURE — 96375 TX/PRO/DX INJ NEW DRUG ADDON: CPT

## 2017-01-01 PROCEDURE — 74011250636 HC RX REV CODE- 250/636: Performed by: ANESTHESIOLOGY

## 2017-01-01 PROCEDURE — P9016 RBC LEUKOCYTES REDUCED: HCPCS | Performed by: INTERNAL MEDICINE

## 2017-01-01 PROCEDURE — 77030003666 HC NDL SPINAL BD -A

## 2017-01-01 PROCEDURE — 76060000035 HC ANESTHESIA 2 TO 2.5 HR: Performed by: INTERNAL MEDICINE

## 2017-01-01 PROCEDURE — 90471 IMMUNIZATION ADMIN: CPT

## 2017-01-01 PROCEDURE — 96365 THER/PROPH/DIAG IV INF INIT: CPT

## 2017-01-01 PROCEDURE — 77030010522

## 2017-01-01 PROCEDURE — 3331090003 HH PPS REVENUE ADJ

## 2017-01-01 PROCEDURE — 0W9G3ZZ DRAINAGE OF PERITONEAL CAVITY, PERCUTANEOUS APPROACH: ICD-10-PCS | Performed by: RADIOLOGY

## 2017-01-01 PROCEDURE — 77030002916 HC SUT ETHLN J&J -A

## 2017-01-01 PROCEDURE — A4649 SURGICAL SUPPLIES: HCPCS

## 2017-01-01 PROCEDURE — 84480 ASSAY TRIIODOTHYRONINE (T3): CPT | Performed by: INTERNAL MEDICINE

## 2017-01-01 PROCEDURE — 77030003504 HC NDL BIOP TISS COOK -A

## 2017-01-01 PROCEDURE — 87077 CULTURE AEROBIC IDENTIFY: CPT | Performed by: INTERNAL MEDICINE

## 2017-01-01 PROCEDURE — 86644 CMV ANTIBODY: CPT | Performed by: INTERNAL MEDICINE

## 2017-01-01 PROCEDURE — 77030010541

## 2017-01-01 PROCEDURE — 0FJB8ZZ INSPECTION OF HEPATOBILIARY DUCT, VIA NATURAL OR ARTIFICIAL OPENING ENDOSCOPIC: ICD-10-PCS | Performed by: INTERNAL MEDICINE

## 2017-01-01 PROCEDURE — 36415 COLL VENOUS BLD VENIPUNCTURE: CPT | Performed by: RADIOLOGY

## 2017-01-01 PROCEDURE — 400013 HH SOC

## 2017-01-01 PROCEDURE — 47540 PERQ PLMT BILE DUCT STENT: CPT

## 2017-01-01 PROCEDURE — 77030004540 HC CATH ANGI DX MRN ANGI -B

## 2017-01-01 PROCEDURE — 0F793DZ DILATION OF COMMON BILE DUCT WITH INTRALUMINAL DEVICE, PERCUTANEOUS APPROACH: ICD-10-PCS | Performed by: RADIOLOGY

## 2017-01-01 PROCEDURE — 49424 ASSESS CYST CONTRAST INJECT: CPT

## 2017-01-01 PROCEDURE — A4450 NON-WATERPROOF TAPE: HCPCS

## 2017-01-01 PROCEDURE — 74011636320 HC RX REV CODE- 636/320: Performed by: RADIOLOGY

## 2017-01-01 PROCEDURE — 82747 ASSAY OF FOLIC ACID RBC: CPT | Performed by: INTERNAL MEDICINE

## 2017-01-01 PROCEDURE — 77030012862

## 2017-01-01 PROCEDURE — 0W9G3ZZ DRAINAGE OF PERITONEAL CAVITY, PERCUTANEOUS APPROACH: ICD-10-PCS | Performed by: PHYSICIAN ASSISTANT

## 2017-01-01 PROCEDURE — P9045 ALBUMIN (HUMAN), 5%, 250 ML: HCPCS | Performed by: NURSE PRACTITIONER

## 2017-01-01 PROCEDURE — 99222 1ST HOSP IP/OBS MODERATE 55: CPT | Performed by: INTERNAL MEDICINE

## 2017-01-01 PROCEDURE — 87106 FUNGI IDENTIFICATION YEAST: CPT | Performed by: INTERNAL MEDICINE

## 2017-01-01 PROCEDURE — 99211 OFF/OP EST MAY X REQ PHY/QHP: CPT

## 2017-01-01 PROCEDURE — 77030008477 HC STYL SATN SLP COVD -A: Performed by: ANESTHESIOLOGY

## 2017-01-01 PROCEDURE — 77030004566 HC CATH ANGI DX TORCON COOK -B

## 2017-01-01 PROCEDURE — 76450000000

## 2017-01-01 PROCEDURE — 80053 COMPREHEN METABOLIC PANEL: CPT | Performed by: RADIOLOGY

## 2017-01-01 PROCEDURE — 96372 THER/PROPH/DIAG INJ SC/IM: CPT

## 2017-01-01 PROCEDURE — 3336500001 HSPC ELECTION

## 2017-01-01 PROCEDURE — 77030032490 HC SLV COMPR SCD KNE COVD -B

## 2017-01-01 PROCEDURE — 77030003669 HC NDL SPN COOK -B

## 2017-01-01 PROCEDURE — 0F754DZ DILATION OF RIGHT HEPATIC DUCT WITH INTRALUMINAL DEVICE, PERCUTANEOUS ENDOSCOPIC APPROACH: ICD-10-PCS | Performed by: RADIOLOGY

## 2017-01-01 PROCEDURE — 0F794DZ DILATION OF COMMON BILE DUCT WITH INTRALUMINAL DEVICE, PERCUTANEOUS ENDOSCOPIC APPROACH: ICD-10-PCS | Performed by: RADIOLOGY

## 2017-01-01 PROCEDURE — 80076 HEPATIC FUNCTION PANEL: CPT | Performed by: INTERNAL MEDICINE

## 2017-01-01 PROCEDURE — A6252 ABSORPT DRG >16 <=48 W/O BDR: HCPCS

## 2017-01-01 PROCEDURE — 74011000250 HC RX REV CODE- 250: Performed by: ANESTHESIOLOGY

## 2017-01-01 PROCEDURE — 74160 CT ABDOMEN W/CONTRAST: CPT

## 2017-01-01 PROCEDURE — 3E0336Z INTRODUCTION OF NUTRITIONAL SUBSTANCE INTO PERIPHERAL VEIN, PERCUTANEOUS APPROACH: ICD-10-PCS | Performed by: NURSE PRACTITIONER

## 2017-01-01 PROCEDURE — 86920 COMPATIBILITY TEST SPIN: CPT | Performed by: INTERNAL MEDICINE

## 2017-01-01 PROCEDURE — 87493 C DIFF AMPLIFIED PROBE: CPT | Performed by: INTERNAL MEDICINE

## 2017-01-01 PROCEDURE — 0F9030Z DRAINAGE OF LIVER WITH DRAINAGE DEVICE, PERCUTANEOUS APPROACH: ICD-10-PCS | Performed by: RADIOLOGY

## 2017-01-01 PROCEDURE — 96360 HYDRATION IV INFUSION INIT: CPT

## 2017-01-01 PROCEDURE — 74011000258 HC RX REV CODE- 258

## 2017-01-01 PROCEDURE — 77030018378 HC SPHNTOM BILI AUTOTOM BSC -C: Performed by: INTERNAL MEDICINE

## 2017-01-01 RX ORDER — LORAZEPAM 2 MG/ML
2 INJECTION INTRAMUSCULAR
Status: DISCONTINUED | OUTPATIENT
Start: 2017-01-01 | End: 2017-01-01 | Stop reason: HOSPADM

## 2017-01-01 RX ORDER — SODIUM CHLORIDE 9 MG/ML
100 INJECTION, SOLUTION INTRAVENOUS CONTINUOUS
Status: DISPENSED | OUTPATIENT
Start: 2017-01-01 | End: 2017-01-01

## 2017-01-01 RX ORDER — HEPARIN 100 UNIT/ML
300 SYRINGE INTRAVENOUS AS NEEDED
Status: DISCONTINUED | OUTPATIENT
Start: 2017-01-01 | End: 2017-01-01 | Stop reason: HOSPADM

## 2017-01-01 RX ORDER — POLYETHYLENE GLYCOL 3350 17 G/17G
17 POWDER, FOR SOLUTION ORAL DAILY
Status: DISCONTINUED | OUTPATIENT
Start: 2017-01-01 | End: 2017-01-01 | Stop reason: SDUPTHER

## 2017-01-01 RX ORDER — CIPROFLOXACIN 2 MG/ML
400 INJECTION, SOLUTION INTRAVENOUS ONCE
Status: COMPLETED | OUTPATIENT
Start: 2017-01-01 | End: 2017-01-01

## 2017-01-01 RX ORDER — LORAZEPAM 2 MG/ML
1 INJECTION INTRAMUSCULAR EVERY 8 HOURS
Status: DISCONTINUED | OUTPATIENT
Start: 2017-01-01 | End: 2017-01-01 | Stop reason: HOSPADM

## 2017-01-01 RX ORDER — FLUCONAZOLE 200 MG/1
400 TABLET ORAL DAILY
Qty: 54 TAB | Refills: 0 | Status: SHIPPED | OUTPATIENT
Start: 2017-01-01 | End: 2017-01-01

## 2017-01-01 RX ORDER — HYDROCODONE BITARTRATE AND ACETAMINOPHEN 10; 325 MG/1; MG/1
1 TABLET ORAL
Status: DISCONTINUED | OUTPATIENT
Start: 2017-01-01 | End: 2017-01-01

## 2017-01-01 RX ORDER — FENTANYL CITRATE 50 UG/ML
25-100 INJECTION, SOLUTION INTRAMUSCULAR; INTRAVENOUS
Status: DISCONTINUED | OUTPATIENT
Start: 2017-01-01 | End: 2017-01-01

## 2017-01-01 RX ORDER — LANOLIN ALCOHOL/MO/W.PET/CERES
400 CREAM (GRAM) TOPICAL DAILY
Status: DISCONTINUED | OUTPATIENT
Start: 2017-01-01 | End: 2017-01-01

## 2017-01-01 RX ORDER — SODIUM CHLORIDE 0.9 % (FLUSH) 0.9 %
10 SYRINGE (ML) INJECTION AS NEEDED
Status: ACTIVE | OUTPATIENT
Start: 2017-01-01 | End: 2017-01-01

## 2017-01-01 RX ORDER — HALOPERIDOL 5 MG/ML
2 INJECTION INTRAMUSCULAR
Status: DISCONTINUED | OUTPATIENT
Start: 2017-01-01 | End: 2017-01-01 | Stop reason: HOSPADM

## 2017-01-01 RX ORDER — PANTOPRAZOLE SODIUM 40 MG/10ML
40 INJECTION, POWDER, LYOPHILIZED, FOR SOLUTION INTRAVENOUS EVERY 24 HOURS
Status: DISCONTINUED | OUTPATIENT
Start: 2017-01-01 | End: 2017-01-01 | Stop reason: SDUPTHER

## 2017-01-01 RX ORDER — LORAZEPAM 2 MG/ML
1 INJECTION INTRAMUSCULAR
Qty: 1 VIAL | Refills: 0 | Status: ON HOLD | OUTPATIENT
Start: 2017-01-01 | End: 2017-01-01

## 2017-01-01 RX ORDER — PANTOPRAZOLE SODIUM 40 MG/1
40 TABLET, DELAYED RELEASE ORAL
Status: DISCONTINUED | OUTPATIENT
Start: 2017-01-01 | End: 2017-01-01 | Stop reason: HOSPADM

## 2017-01-01 RX ORDER — DOXYCYCLINE 100 MG/1
100 CAPSULE ORAL EVERY 12 HOURS
Qty: 54 CAP | Refills: 0 | Status: SHIPPED | OUTPATIENT
Start: 2017-01-01 | End: 2017-01-01

## 2017-01-01 RX ORDER — ACETAMINOPHEN 325 MG/1
650 TABLET ORAL
Status: DISCONTINUED | OUTPATIENT
Start: 2017-01-01 | End: 2017-01-01 | Stop reason: HOSPADM

## 2017-01-01 RX ORDER — SODIUM CHLORIDE 9 MG/ML
250 INJECTION, SOLUTION INTRAVENOUS AS NEEDED
Status: DISCONTINUED | OUTPATIENT
Start: 2017-01-01 | End: 2017-01-01 | Stop reason: HOSPADM

## 2017-01-01 RX ORDER — GLYCOPYRROLATE 0.2 MG/ML
0.2 INJECTION INTRAMUSCULAR; INTRAVENOUS
Status: DISCONTINUED | OUTPATIENT
Start: 2017-01-01 | End: 2017-01-01 | Stop reason: HOSPADM

## 2017-01-01 RX ORDER — ROCURONIUM BROMIDE 10 MG/ML
INJECTION, SOLUTION INTRAVENOUS AS NEEDED
Status: DISCONTINUED | OUTPATIENT
Start: 2017-01-01 | End: 2017-01-01 | Stop reason: HOSPADM

## 2017-01-01 RX ORDER — SODIUM CHLORIDE 0.9 % (FLUSH) 0.9 %
10 SYRINGE (ML) INJECTION
Status: COMPLETED | OUTPATIENT
Start: 2017-01-01 | End: 2017-01-01

## 2017-01-01 RX ORDER — ENOXAPARIN SODIUM 100 MG/ML
40 INJECTION SUBCUTANEOUS EVERY 24 HOURS
Status: DISCONTINUED | OUTPATIENT
Start: 2017-01-01 | End: 2017-01-01 | Stop reason: HOSPADM

## 2017-01-01 RX ORDER — HYOSCYAMINE SULFATE 0.12 MG/ML
250 LIQUID ORAL
Status: DISCONTINUED | OUTPATIENT
Start: 2017-01-01 | End: 2017-01-01 | Stop reason: HOSPADM

## 2017-01-01 RX ORDER — ONDANSETRON HYDROCHLORIDE 8 MG/1
8 TABLET, FILM COATED ORAL
COMMUNITY

## 2017-01-01 RX ORDER — HYDROMORPHONE HYDROCHLORIDE 1 MG/ML
1 INJECTION, SOLUTION INTRAMUSCULAR; INTRAVENOUS; SUBCUTANEOUS
Status: DISCONTINUED | OUTPATIENT
Start: 2017-01-01 | End: 2017-01-01

## 2017-01-01 RX ORDER — AMOXICILLIN 250 MG
1 CAPSULE ORAL DAILY
Status: DISCONTINUED | OUTPATIENT
Start: 2017-01-01 | End: 2017-01-01

## 2017-01-01 RX ORDER — ACETAMINOPHEN 325 MG/1
650 TABLET ORAL ONCE
Status: COMPLETED | OUTPATIENT
Start: 2017-01-01 | End: 2017-01-01

## 2017-01-01 RX ORDER — DEXAMETHASONE SODIUM PHOSPHATE 100 MG/10ML
10 INJECTION INTRAMUSCULAR; INTRAVENOUS ONCE
Status: COMPLETED | OUTPATIENT
Start: 2017-01-01 | End: 2017-01-01

## 2017-01-01 RX ORDER — AMOXICILLIN AND CLAVULANATE POTASSIUM 875; 125 MG/1; MG/1
1 TABLET, FILM COATED ORAL EVERY 12 HOURS
Status: DISCONTINUED | OUTPATIENT
Start: 2017-01-01 | End: 2017-01-01

## 2017-01-01 RX ORDER — LIDOCAINE HYDROCHLORIDE 20 MG/ML
15 SOLUTION OROPHARYNGEAL ONCE
Status: ACTIVE | OUTPATIENT
Start: 2017-01-01 | End: 2017-01-01

## 2017-01-01 RX ORDER — MUPIROCIN 20 MG/G
OINTMENT TOPICAL 2 TIMES DAILY
Status: COMPLETED | OUTPATIENT
Start: 2017-01-01 | End: 2017-01-01

## 2017-01-01 RX ORDER — FENTANYL CITRATE 50 UG/ML
25-50 INJECTION, SOLUTION INTRAMUSCULAR; INTRAVENOUS
Status: DISCONTINUED | OUTPATIENT
Start: 2017-01-01 | End: 2017-01-01 | Stop reason: ALTCHOICE

## 2017-01-01 RX ORDER — METOCLOPRAMIDE HYDROCHLORIDE 5 MG/ML
10 INJECTION INTRAMUSCULAR; INTRAVENOUS ONCE
Status: COMPLETED | OUTPATIENT
Start: 2017-01-01 | End: 2017-01-01

## 2017-01-01 RX ORDER — HYDROMORPHONE HYDROCHLORIDE 2 MG/ML
0.5 INJECTION, SOLUTION INTRAMUSCULAR; INTRAVENOUS; SUBCUTANEOUS
Status: DISCONTINUED | OUTPATIENT
Start: 2017-01-01 | End: 2017-01-01 | Stop reason: HOSPADM

## 2017-01-01 RX ORDER — SODIUM CHLORIDE 9 MG/ML
INJECTION, SOLUTION INTRAVENOUS
Status: DISCONTINUED | OUTPATIENT
Start: 2017-01-01 | End: 2017-01-01 | Stop reason: HOSPADM

## 2017-01-01 RX ORDER — SODIUM CHLORIDE 9 MG/ML
1000 INJECTION, SOLUTION INTRAVENOUS ONCE
Status: COMPLETED | OUTPATIENT
Start: 2017-01-01 | End: 2017-01-01

## 2017-01-01 RX ORDER — HYDROMORPHONE HYDROCHLORIDE 1 MG/ML
1 INJECTION, SOLUTION INTRAMUSCULAR; INTRAVENOUS; SUBCUTANEOUS
Status: DISCONTINUED | OUTPATIENT
Start: 2017-01-01 | End: 2017-01-01 | Stop reason: HOSPADM

## 2017-01-01 RX ORDER — LUBIPROSTONE 8 UG/1
8 CAPSULE, GELATIN COATED ORAL 2 TIMES DAILY WITH MEALS
Status: DISCONTINUED | OUTPATIENT
Start: 2017-01-01 | End: 2017-01-01 | Stop reason: HOSPADM

## 2017-01-01 RX ORDER — ATENOLOL 50 MG/1
50 TABLET ORAL DAILY
Status: DISCONTINUED | OUTPATIENT
Start: 2017-01-01 | End: 2017-01-01 | Stop reason: HOSPADM

## 2017-01-01 RX ORDER — PROCHLORPERAZINE EDISYLATE 5 MG/ML
10 INJECTION INTRAMUSCULAR; INTRAVENOUS
Status: ACTIVE | OUTPATIENT
Start: 2017-01-01 | End: 2017-01-01

## 2017-01-01 RX ORDER — HEPARIN 100 UNIT/ML
500 SYRINGE INTRAVENOUS ONCE
Status: DISCONTINUED | OUTPATIENT
Start: 2017-01-01 | End: 2017-01-01 | Stop reason: HOSPADM

## 2017-01-01 RX ORDER — HYDROCODONE BITARTRATE AND ACETAMINOPHEN 10; 325 MG/1; MG/1
1 TABLET ORAL ONCE
Status: COMPLETED | OUTPATIENT
Start: 2017-01-01 | End: 2017-01-01

## 2017-01-01 RX ORDER — DEXAMETHASONE 0.5 MG/1
1 TABLET ORAL DAILY
Status: DISCONTINUED | OUTPATIENT
Start: 2017-01-01 | End: 2017-01-01 | Stop reason: HOSPADM

## 2017-01-01 RX ORDER — OLMESARTAN MEDOXOMIL 40 MG/1
20 TABLET ORAL DAILY
Status: ON HOLD | COMMUNITY
Start: 2012-07-16 | End: 2017-01-01

## 2017-01-01 RX ORDER — LOPERAMIDE HYDROCHLORIDE 2 MG/1
4 CAPSULE ORAL AS NEEDED
Status: DISCONTINUED | OUTPATIENT
Start: 2017-01-01 | End: 2017-01-01 | Stop reason: HOSPADM

## 2017-01-01 RX ORDER — HEPARIN 100 UNIT/ML
300-500 SYRINGE INTRAVENOUS AS NEEDED
Status: DISPENSED | OUTPATIENT
Start: 2017-01-01 | End: 2017-01-01

## 2017-01-01 RX ORDER — LIDOCAINE HYDROCHLORIDE 20 MG/ML
50-200 INJECTION, SOLUTION INFILTRATION; PERINEURAL ONCE
Status: COMPLETED | OUTPATIENT
Start: 2017-01-01 | End: 2017-01-01

## 2017-01-01 RX ORDER — MIDAZOLAM HYDROCHLORIDE 1 MG/ML
.5-2 INJECTION, SOLUTION INTRAMUSCULAR; INTRAVENOUS
Status: DISCONTINUED | OUTPATIENT
Start: 2017-01-01 | End: 2017-01-01 | Stop reason: ALTCHOICE

## 2017-01-01 RX ORDER — POLYETHYLENE GLYCOL 3350 17 G/17G
17 POWDER, FOR SOLUTION ORAL DAILY
Status: DISCONTINUED | OUTPATIENT
Start: 2017-01-01 | End: 2017-01-01

## 2017-01-01 RX ORDER — HEPARIN 100 UNIT/ML
300 SYRINGE INTRAVENOUS AS NEEDED
Status: ACTIVE | OUTPATIENT
Start: 2017-01-01 | End: 2017-01-01

## 2017-01-01 RX ORDER — HYDROCODONE BITARTRATE AND ACETAMINOPHEN 10; 325 MG/1; MG/1
1 TABLET ORAL
Qty: 60 TAB | Refills: 0 | Status: SHIPPED | OUTPATIENT
Start: 2017-01-01 | End: 2017-01-01

## 2017-01-01 RX ORDER — DIPHENHYDRAMINE HYDROCHLORIDE 50 MG/ML
25-50 INJECTION, SOLUTION INTRAMUSCULAR; INTRAVENOUS ONCE
Status: ACTIVE | OUTPATIENT
Start: 2017-01-01 | End: 2017-01-01

## 2017-01-01 RX ORDER — LORAZEPAM 2 MG/ML
1 INJECTION INTRAMUSCULAR
Status: DISCONTINUED | OUTPATIENT
Start: 2017-01-01 | End: 2017-01-01

## 2017-01-01 RX ORDER — SODIUM CHLORIDE 9 MG/ML
100 INJECTION, SOLUTION INTRAVENOUS CONTINUOUS
Status: DISCONTINUED | OUTPATIENT
Start: 2017-01-01 | End: 2017-01-01 | Stop reason: HOSPADM

## 2017-01-01 RX ORDER — SUCCINYLCHOLINE CHLORIDE 20 MG/ML
INJECTION INTRAMUSCULAR; INTRAVENOUS AS NEEDED
Status: DISCONTINUED | OUTPATIENT
Start: 2017-01-01 | End: 2017-01-01 | Stop reason: HOSPADM

## 2017-01-01 RX ORDER — DIPHENHYDRAMINE HCL 25 MG
25 CAPSULE ORAL ONCE
Status: DISCONTINUED | OUTPATIENT
Start: 2017-01-01 | End: 2017-01-01

## 2017-01-01 RX ORDER — DOXYCYCLINE 100 MG/1
100 CAPSULE ORAL EVERY 12 HOURS
Status: DISCONTINUED | OUTPATIENT
Start: 2017-01-01 | End: 2017-01-01 | Stop reason: HOSPADM

## 2017-01-01 RX ORDER — SENNOSIDES 8.6 MG/1
1 TABLET ORAL 2 TIMES DAILY
Status: DISCONTINUED | OUTPATIENT
Start: 2017-01-01 | End: 2017-01-01 | Stop reason: HOSPADM

## 2017-01-01 RX ORDER — POLYETHYLENE GLYCOL 3350 17 G/17G
17 POWDER, FOR SOLUTION ORAL DAILY
Status: DISCONTINUED | OUTPATIENT
Start: 2017-01-01 | End: 2017-01-01 | Stop reason: HOSPADM

## 2017-01-01 RX ORDER — SODIUM CHLORIDE 9 MG/ML
75 INJECTION, SOLUTION INTRAVENOUS CONTINUOUS
Status: DISCONTINUED | OUTPATIENT
Start: 2017-01-01 | End: 2017-01-01 | Stop reason: HOSPADM

## 2017-01-01 RX ORDER — BUPIVACAINE HYDROCHLORIDE 5 MG/ML
5 INJECTION, SOLUTION EPIDURAL; INTRACAUDAL ONCE
Status: COMPLETED | OUTPATIENT
Start: 2017-01-01 | End: 2017-01-01

## 2017-01-01 RX ORDER — ONDANSETRON 2 MG/ML
4 INJECTION INTRAMUSCULAR; INTRAVENOUS
Status: DISCONTINUED | OUTPATIENT
Start: 2017-01-01 | End: 2017-01-01 | Stop reason: HOSPADM

## 2017-01-01 RX ORDER — SODIUM CHLORIDE 9 MG/ML
75 INJECTION, SOLUTION INTRAVENOUS CONTINUOUS
Status: DISCONTINUED | OUTPATIENT
Start: 2017-01-01 | End: 2017-01-01 | Stop reason: ALTCHOICE

## 2017-01-01 RX ORDER — LORAZEPAM 2 MG/ML
0.5 INJECTION INTRAMUSCULAR
Status: ACTIVE | OUTPATIENT
Start: 2017-01-01 | End: 2017-01-01

## 2017-01-01 RX ORDER — ONDANSETRON 2 MG/ML
8 INJECTION INTRAMUSCULAR; INTRAVENOUS ONCE
Status: COMPLETED | OUTPATIENT
Start: 2017-01-01 | End: 2017-01-01

## 2017-01-01 RX ORDER — HYDROMORPHONE HYDROCHLORIDE 1 MG/ML
0.5 INJECTION, SOLUTION INTRAMUSCULAR; INTRAVENOUS; SUBCUTANEOUS
Status: DISCONTINUED | OUTPATIENT
Start: 2017-01-01 | End: 2017-01-01

## 2017-01-01 RX ORDER — SODIUM CHLORIDE, SODIUM LACTATE, POTASSIUM CHLORIDE, CALCIUM CHLORIDE 600; 310; 30; 20 MG/100ML; MG/100ML; MG/100ML; MG/100ML
75 INJECTION, SOLUTION INTRAVENOUS CONTINUOUS
Status: DISCONTINUED | OUTPATIENT
Start: 2017-01-01 | End: 2017-01-01 | Stop reason: HOSPADM

## 2017-01-01 RX ORDER — LIDOCAINE HYDROCHLORIDE 20 MG/ML
20-200 INJECTION, SOLUTION INFILTRATION; PERINEURAL ONCE
Status: COMPLETED | OUTPATIENT
Start: 2017-01-01 | End: 2017-01-01

## 2017-01-01 RX ORDER — SODIUM CHLORIDE 9 MG/ML
250 INJECTION, SOLUTION INTRAVENOUS AS NEEDED
Status: DISCONTINUED | OUTPATIENT
Start: 2017-01-01 | End: 2017-01-01 | Stop reason: SDUPTHER

## 2017-01-01 RX ORDER — DEXAMETHASONE SODIUM PHOSPHATE 4 MG/ML
INJECTION, SOLUTION INTRA-ARTICULAR; INTRALESIONAL; INTRAMUSCULAR; INTRAVENOUS; SOFT TISSUE AS NEEDED
Status: DISCONTINUED | OUTPATIENT
Start: 2017-01-01 | End: 2017-01-01 | Stop reason: HOSPADM

## 2017-01-01 RX ORDER — VANCOMYCIN HYDROCHLORIDE
1250 EVERY 12 HOURS
Status: DISCONTINUED | OUTPATIENT
Start: 2017-01-01 | End: 2017-01-01 | Stop reason: HOSPADM

## 2017-01-01 RX ORDER — ONDANSETRON 2 MG/ML
INJECTION INTRAMUSCULAR; INTRAVENOUS AS NEEDED
Status: DISCONTINUED | OUTPATIENT
Start: 2017-01-01 | End: 2017-01-01 | Stop reason: HOSPADM

## 2017-01-01 RX ORDER — ACETAMINOPHEN 650 MG/1
650 SUPPOSITORY RECTAL
Status: DISCONTINUED | OUTPATIENT
Start: 2017-01-01 | End: 2017-01-01 | Stop reason: HOSPADM

## 2017-01-01 RX ORDER — SODIUM CHLORIDE 0.9 % (FLUSH) 0.9 %
10 SYRINGE (ML) INJECTION AS NEEDED
Status: DISCONTINUED | OUTPATIENT
Start: 2017-01-01 | End: 2017-01-01 | Stop reason: HOSPADM

## 2017-01-01 RX ORDER — CIPROFLOXACIN 2 MG/ML
400 INJECTION, SOLUTION INTRAVENOUS EVERY 12 HOURS
Status: DISCONTINUED | OUTPATIENT
Start: 2017-01-01 | End: 2017-01-01 | Stop reason: SDUPTHER

## 2017-01-01 RX ORDER — HEPARIN 100 UNIT/ML
500 SYRINGE INTRAVENOUS AS NEEDED
Status: DISPENSED | OUTPATIENT
Start: 2017-01-01 | End: 2017-01-01

## 2017-01-01 RX ORDER — AMOXICILLIN 250 MG
1 CAPSULE ORAL DAILY
COMMUNITY

## 2017-01-01 RX ORDER — HYDROMORPHONE HYDROCHLORIDE 1 MG/ML
0.5 INJECTION, SOLUTION INTRAMUSCULAR; INTRAVENOUS; SUBCUTANEOUS
Qty: 5 ML | Refills: 0 | Status: ON HOLD | OUTPATIENT
Start: 2017-01-01 | End: 2017-01-01

## 2017-01-01 RX ORDER — POLYETHYLENE GLYCOL 3350 17 G/17G
17 POWDER, FOR SOLUTION ORAL DAILY
COMMUNITY

## 2017-01-01 RX ORDER — EPINEPHRINE 0.1 MG/ML
INJECTION INTRACARDIAC; INTRAVENOUS
Status: DISCONTINUED
Start: 2017-01-01 | End: 2017-01-01

## 2017-01-01 RX ORDER — MIDAZOLAM HYDROCHLORIDE 1 MG/ML
.25-2 INJECTION, SOLUTION INTRAMUSCULAR; INTRAVENOUS
Status: DISCONTINUED | OUTPATIENT
Start: 2017-01-01 | End: 2017-01-01

## 2017-01-01 RX ORDER — SODIUM CHLORIDE 9 MG/ML
125 INJECTION, SOLUTION INTRAVENOUS CONTINUOUS
Status: DISCONTINUED | OUTPATIENT
Start: 2017-01-01 | End: 2017-01-01

## 2017-01-01 RX ORDER — SODIUM CHLORIDE, SODIUM LACTATE, POTASSIUM CHLORIDE, CALCIUM CHLORIDE 600; 310; 30; 20 MG/100ML; MG/100ML; MG/100ML; MG/100ML
1000 INJECTION, SOLUTION INTRAVENOUS CONTINUOUS
Status: DISCONTINUED | OUTPATIENT
Start: 2017-01-01 | End: 2017-01-01 | Stop reason: HOSPADM

## 2017-01-01 RX ORDER — HYDROMORPHONE HYDROCHLORIDE 1 MG/ML
0.5 INJECTION, SOLUTION INTRAMUSCULAR; INTRAVENOUS; SUBCUTANEOUS
Status: DISCONTINUED | OUTPATIENT
Start: 2017-01-01 | End: 2017-01-01 | Stop reason: HOSPADM

## 2017-01-01 RX ORDER — LORAZEPAM 1 MG/1
2 TABLET ORAL
Status: DISCONTINUED | OUTPATIENT
Start: 2017-01-01 | End: 2017-01-01 | Stop reason: HOSPADM

## 2017-01-01 RX ORDER — DEXAMETHASONE SODIUM PHOSPHATE 4 MG/ML
2 INJECTION, SOLUTION INTRA-ARTICULAR; INTRALESIONAL; INTRAMUSCULAR; INTRAVENOUS; SOFT TISSUE DAILY
Status: DISCONTINUED | OUTPATIENT
Start: 2017-01-01 | End: 2017-01-01 | Stop reason: HOSPADM

## 2017-01-01 RX ORDER — LIDOCAINE HYDROCHLORIDE 20 MG/ML
100-200 INJECTION, SOLUTION INFILTRATION; PERINEURAL ONCE
Status: COMPLETED | OUTPATIENT
Start: 2017-01-01 | End: 2017-01-01

## 2017-01-01 RX ORDER — OXYCODONE AND ACETAMINOPHEN 5; 325 MG/1; MG/1
1 TABLET ORAL AS NEEDED
Status: DISCONTINUED | OUTPATIENT
Start: 2017-01-01 | End: 2017-01-01 | Stop reason: HOSPADM

## 2017-01-01 RX ORDER — SODIUM CHLORIDE 0.9 % (FLUSH) 0.9 %
5-10 SYRINGE (ML) INJECTION AS NEEDED
Status: DISCONTINUED | OUTPATIENT
Start: 2017-01-01 | End: 2017-01-01 | Stop reason: HOSPADM

## 2017-01-01 RX ORDER — DIPHENHYDRAMINE HCL 25 MG
25 CAPSULE ORAL
Status: DISCONTINUED | OUTPATIENT
Start: 2017-01-01 | End: 2017-01-01

## 2017-01-01 RX ORDER — SODIUM CHLORIDE 9 MG/ML
250 INJECTION, SOLUTION INTRAVENOUS AS NEEDED
Status: CANCELLED | OUTPATIENT
Start: 2017-01-01

## 2017-01-01 RX ORDER — OXYCODONE HYDROCHLORIDE 15 MG/1
15 TABLET, FILM COATED, EXTENDED RELEASE ORAL EVERY 12 HOURS
Qty: 60 TAB | Refills: 0 | Status: SHIPPED
Start: 2017-01-01 | End: 2017-01-01

## 2017-01-01 RX ORDER — ACETAMINOPHEN 325 MG/1
650 TABLET ORAL
Status: COMPLETED | OUTPATIENT
Start: 2017-01-01 | End: 2017-01-01

## 2017-01-01 RX ORDER — AMOXICILLIN AND CLAVULANATE POTASSIUM 875; 125 MG/1; MG/1
1 TABLET, FILM COATED ORAL EVERY 12 HOURS
Status: DISCONTINUED | OUTPATIENT
Start: 2017-01-01 | End: 2017-01-01 | Stop reason: HOSPADM

## 2017-01-01 RX ORDER — HALOPERIDOL 5 MG/ML
4 INJECTION INTRAMUSCULAR
Status: DISCONTINUED | OUTPATIENT
Start: 2017-01-01 | End: 2017-01-01 | Stop reason: HOSPADM

## 2017-01-01 RX ORDER — SODIUM CHLORIDE 9 MG/ML
500 INJECTION, SOLUTION INTRAVENOUS CONTINUOUS
Status: DISCONTINUED | OUTPATIENT
Start: 2017-01-01 | End: 2017-01-01 | Stop reason: ALTCHOICE

## 2017-01-01 RX ORDER — AMOXICILLIN AND CLAVULANATE POTASSIUM 875; 125 MG/1; MG/1
1 TABLET, FILM COATED ORAL EVERY 12 HOURS
Qty: 54 TAB | Refills: 0 | Status: SHIPPED | OUTPATIENT
Start: 2017-01-01 | End: 2017-01-01

## 2017-01-01 RX ORDER — SUCRALFATE 1 G/1
1 TABLET ORAL
Status: DISCONTINUED | OUTPATIENT
Start: 2017-01-01 | End: 2017-01-01

## 2017-01-01 RX ORDER — NALOXONE HYDROCHLORIDE 0.4 MG/ML
0.2 INJECTION, SOLUTION INTRAMUSCULAR; INTRAVENOUS; SUBCUTANEOUS AS NEEDED
Status: DISCONTINUED | OUTPATIENT
Start: 2017-01-01 | End: 2017-01-01 | Stop reason: HOSPADM

## 2017-01-01 RX ORDER — FENTANYL CITRATE 50 UG/ML
INJECTION, SOLUTION INTRAMUSCULAR; INTRAVENOUS AS NEEDED
Status: DISCONTINUED | OUTPATIENT
Start: 2017-01-01 | End: 2017-01-01 | Stop reason: HOSPADM

## 2017-01-01 RX ORDER — METHYLPREDNISOLONE ACETATE 40 MG/ML
40 INJECTION, SUSPENSION INTRA-ARTICULAR; INTRALESIONAL; INTRAMUSCULAR; SOFT TISSUE ONCE
Status: COMPLETED | OUTPATIENT
Start: 2017-01-01 | End: 2017-01-01

## 2017-01-01 RX ORDER — LORAZEPAM 2 MG/ML
1 INJECTION INTRAMUSCULAR
Status: DISCONTINUED | OUTPATIENT
Start: 2017-01-01 | End: 2017-01-01 | Stop reason: HOSPADM

## 2017-01-01 RX ORDER — DIPHENHYDRAMINE HYDROCHLORIDE 50 MG/ML
12.5-5 INJECTION, SOLUTION INTRAMUSCULAR; INTRAVENOUS ONCE
Status: DISCONTINUED | OUTPATIENT
Start: 2017-01-01 | End: 2017-01-01

## 2017-01-01 RX ORDER — ALBUMIN HUMAN 50 G/1000ML
25 SOLUTION INTRAVENOUS EVERY 8 HOURS
Status: COMPLETED | OUTPATIENT
Start: 2017-01-01 | End: 2017-01-01

## 2017-01-01 RX ORDER — DOXYCYCLINE 100 MG/1
100 CAPSULE ORAL EVERY 12 HOURS
Status: DISCONTINUED | OUTPATIENT
Start: 2017-01-01 | End: 2017-01-01

## 2017-01-01 RX ORDER — FLUCONAZOLE 100 MG/1
400 TABLET ORAL DAILY
Status: DISCONTINUED | OUTPATIENT
Start: 2017-01-01 | End: 2017-01-01 | Stop reason: HOSPADM

## 2017-01-01 RX ORDER — DIPHENHYDRAMINE HYDROCHLORIDE 50 MG/ML
25 INJECTION, SOLUTION INTRAMUSCULAR; INTRAVENOUS
Status: ACTIVE | OUTPATIENT
Start: 2017-01-01 | End: 2017-01-01

## 2017-01-01 RX ORDER — SODIUM CHLORIDE 0.9 % (FLUSH) 0.9 %
20 SYRINGE (ML) INJECTION EVERY 8 HOURS
Status: DISCONTINUED | OUTPATIENT
Start: 2017-01-01 | End: 2017-01-01 | Stop reason: HOSPADM

## 2017-01-01 RX ORDER — ZOLPIDEM TARTRATE 5 MG/1
5 TABLET ORAL
Status: DISCONTINUED | OUTPATIENT
Start: 2017-01-01 | End: 2017-01-01 | Stop reason: HOSPADM

## 2017-01-01 RX ORDER — PROPOFOL 10 MG/ML
INJECTION, EMULSION INTRAVENOUS AS NEEDED
Status: DISCONTINUED | OUTPATIENT
Start: 2017-01-01 | End: 2017-01-01 | Stop reason: HOSPADM

## 2017-01-01 RX ORDER — DEXTROSE MONOHYDRATE AND SODIUM CHLORIDE 5; .45 G/100ML; G/100ML
100 INJECTION, SOLUTION INTRAVENOUS CONTINUOUS
Status: DISPENSED | OUTPATIENT
Start: 2017-01-01 | End: 2017-01-01

## 2017-01-01 RX ORDER — ONDANSETRON 2 MG/ML
8 INJECTION INTRAMUSCULAR; INTRAVENOUS
Status: DISCONTINUED | OUTPATIENT
Start: 2017-01-01 | End: 2017-01-01 | Stop reason: HOSPADM

## 2017-01-01 RX ORDER — HEPARIN 100 UNIT/ML
500 SYRINGE INTRAVENOUS AS NEEDED
Status: DISCONTINUED | OUTPATIENT
Start: 2017-01-01 | End: 2017-01-01 | Stop reason: HOSPADM

## 2017-01-01 RX ORDER — SODIUM CHLORIDE, SODIUM LACTATE, POTASSIUM CHLORIDE, CALCIUM CHLORIDE 600; 310; 30; 20 MG/100ML; MG/100ML; MG/100ML; MG/100ML
INJECTION, SOLUTION INTRAVENOUS
Status: DISCONTINUED | OUTPATIENT
Start: 2017-01-01 | End: 2017-01-01 | Stop reason: HOSPADM

## 2017-01-01 RX ORDER — ZOLPIDEM TARTRATE 5 MG/1
5 TABLET ORAL
Status: ON HOLD | COMMUNITY
Start: 2017-01-01 | End: 2017-01-01

## 2017-01-01 RX ORDER — LORAZEPAM 2 MG/ML
1 INJECTION INTRAMUSCULAR ONCE
Status: COMPLETED | OUTPATIENT
Start: 2017-01-01 | End: 2017-01-01

## 2017-01-01 RX ORDER — ATENOLOL 50 MG/1
50 TABLET ORAL DAILY
Status: ON HOLD | COMMUNITY
Start: 2012-07-16 | End: 2017-01-01

## 2017-01-01 RX ORDER — LIDOCAINE HYDROCHLORIDE 20 MG/ML
INJECTION, SOLUTION EPIDURAL; INFILTRATION; INTRACAUDAL; PERINEURAL AS NEEDED
Status: DISCONTINUED | OUTPATIENT
Start: 2017-01-01 | End: 2017-01-01 | Stop reason: HOSPADM

## 2017-01-01 RX ORDER — RISPERIDONE 0.5 MG/1
0.25 TABLET, FILM COATED ORAL 2 TIMES DAILY
Status: DISCONTINUED | OUTPATIENT
Start: 2017-01-01 | End: 2017-01-01

## 2017-01-01 RX ORDER — PROCHLORPERAZINE EDISYLATE 5 MG/ML
10 INJECTION INTRAMUSCULAR; INTRAVENOUS
Status: DISCONTINUED | OUTPATIENT
Start: 2017-01-01 | End: 2017-01-01 | Stop reason: HOSPADM

## 2017-01-01 RX ORDER — HYDROCODONE BITARTRATE AND ACETAMINOPHEN 10; 325 MG/1; MG/1
1 TABLET ORAL
COMMUNITY
Start: 2017-01-01

## 2017-01-01 RX ORDER — LANOLIN ALCOHOL/MO/W.PET/CERES
400 CREAM (GRAM) TOPICAL DAILY
COMMUNITY

## 2017-01-01 RX ORDER — LORAZEPAM 2 MG/ML
.5-1 INJECTION INTRAMUSCULAR
Status: DISCONTINUED | OUTPATIENT
Start: 2017-01-01 | End: 2017-01-01 | Stop reason: HOSPADM

## 2017-01-01 RX ORDER — SODIUM CHLORIDE 0.9 % (FLUSH) 0.9 %
5-10 SYRINGE (ML) INJECTION EVERY 8 HOURS
Status: DISCONTINUED | OUTPATIENT
Start: 2017-01-01 | End: 2017-01-01 | Stop reason: HOSPADM

## 2017-01-01 RX ORDER — PROPOFOL 10 MG/ML
INJECTION, EMULSION INTRAVENOUS
Status: DISCONTINUED | OUTPATIENT
Start: 2017-01-01 | End: 2017-01-01 | Stop reason: HOSPADM

## 2017-01-01 RX ORDER — SODIUM CHLORIDE 0.9 % (FLUSH) 0.9 %
10-30 SYRINGE (ML) INJECTION AS NEEDED
Status: DISCONTINUED | OUTPATIENT
Start: 2017-01-01 | End: 2017-01-01 | Stop reason: HOSPADM

## 2017-01-01 RX ORDER — SODIUM CHLORIDE 9 MG/ML
250 INJECTION, SOLUTION INTRAVENOUS AS NEEDED
Status: DISCONTINUED | OUTPATIENT
Start: 2017-01-01 | End: 2017-01-01

## 2017-01-01 RX ORDER — HALOPERIDOL 5 MG/ML
4 INJECTION INTRAMUSCULAR
Qty: 1 VIAL | Refills: 0 | Status: ON HOLD | OUTPATIENT
Start: 2017-01-01 | End: 2017-01-01

## 2017-01-01 RX ORDER — ONDANSETRON 8 MG/1
8 TABLET, ORALLY DISINTEGRATING ORAL
Status: DISCONTINUED | OUTPATIENT
Start: 2017-01-01 | End: 2017-01-01

## 2017-01-01 RX ORDER — LIDOCAINE HYDROCHLORIDE 20 MG/ML
20-400 INJECTION, SOLUTION INFILTRATION; PERINEURAL ONCE
Status: COMPLETED | OUTPATIENT
Start: 2017-01-01 | End: 2017-01-01

## 2017-01-01 RX ORDER — ONDANSETRON 8 MG/1
8 TABLET, ORALLY DISINTEGRATING ORAL
Status: DISCONTINUED | OUTPATIENT
Start: 2017-01-01 | End: 2017-01-01 | Stop reason: HOSPADM

## 2017-01-01 RX ORDER — HYDROCODONE BITARTRATE AND ACETAMINOPHEN 10; 325 MG/1; MG/1
1 TABLET ORAL
Status: DISCONTINUED | OUTPATIENT
Start: 2017-01-01 | End: 2017-01-01 | Stop reason: HOSPADM

## 2017-01-01 RX ORDER — AMOXICILLIN 250 MG
1 CAPSULE ORAL DAILY PRN
Status: DISCONTINUED | OUTPATIENT
Start: 2017-01-01 | End: 2017-01-01 | Stop reason: HOSPADM

## 2017-01-01 RX ORDER — LUBIPROSTONE 8 UG/1
8 CAPSULE, GELATIN COATED ORAL 2 TIMES DAILY
Status: DISCONTINUED | OUTPATIENT
Start: 2017-01-01 | End: 2017-01-01

## 2017-01-01 RX ORDER — LUBIPROSTONE 8 UG/1
8 CAPSULE, GELATIN COATED ORAL 2 TIMES DAILY
COMMUNITY
Start: 2017-01-01

## 2017-01-01 RX ORDER — LOPERAMIDE HYDROCHLORIDE 2 MG/1
2 CAPSULE ORAL
Status: DISCONTINUED | OUTPATIENT
Start: 2017-01-01 | End: 2017-01-01 | Stop reason: HOSPADM

## 2017-01-01 RX ORDER — ZOLPIDEM TARTRATE 5 MG/1
5 TABLET ORAL
Status: DISCONTINUED | OUTPATIENT
Start: 2017-01-01 | End: 2017-01-01 | Stop reason: SDUPTHER

## 2017-01-01 RX ORDER — DIPHENHYDRAMINE HCL 25 MG
25 CAPSULE ORAL
Status: DISCONTINUED | OUTPATIENT
Start: 2017-01-01 | End: 2017-01-01 | Stop reason: HOSPADM

## 2017-01-01 RX ORDER — SODIUM CHLORIDE 9 MG/ML
25 INJECTION, SOLUTION INTRAVENOUS ONCE
Status: COMPLETED | OUTPATIENT
Start: 2017-01-01 | End: 2017-01-01

## 2017-01-01 RX ORDER — SAME BUTANEDISULFONATE/BETAINE 400-600 MG
500 POWDER IN PACKET (EA) ORAL 2 TIMES DAILY
Status: DISCONTINUED | OUTPATIENT
Start: 2017-01-01 | End: 2017-01-01

## 2017-01-01 RX ORDER — FAMOTIDINE 10 MG/ML
20 INJECTION INTRAVENOUS ONCE
Status: COMPLETED | OUTPATIENT
Start: 2017-01-01 | End: 2017-01-01

## 2017-01-01 RX ORDER — EPINEPHRINE 0.1 MG/ML
1 INJECTION INTRACARDIAC; INTRAVENOUS
Status: COMPLETED | OUTPATIENT
Start: 2017-01-01 | End: 2017-01-01

## 2017-01-01 RX ADMIN — VANCOMYCIN HYDROCHLORIDE 1000 MG: 1 INJECTION, POWDER, LYOPHILIZED, FOR SOLUTION INTRAVENOUS at 08:17

## 2017-01-01 RX ADMIN — MIDAZOLAM HYDROCHLORIDE 1 MG: 1 INJECTION, SOLUTION INTRAMUSCULAR; INTRAVENOUS at 15:13

## 2017-01-01 RX ADMIN — DEXAMETHASONE SODIUM PHOSPHATE 10 MG: 10 INJECTION INTRAMUSCULAR; INTRAVENOUS at 12:30

## 2017-01-01 RX ADMIN — HYDROMORPHONE HYDROCHLORIDE 0.5 MG: 1 INJECTION, SOLUTION INTRAMUSCULAR; INTRAVENOUS; SUBCUTANEOUS at 22:35

## 2017-01-01 RX ADMIN — SOYBEAN OIL 250 ML: 20 INJECTION, SOLUTION INTRAVENOUS at 18:00

## 2017-01-01 RX ADMIN — HYDROMORPHONE HYDROCHLORIDE 1 MG: 1 INJECTION, SOLUTION INTRAMUSCULAR; INTRAVENOUS; SUBCUTANEOUS at 15:24

## 2017-01-01 RX ADMIN — VANCOMYCIN HYDROCHLORIDE 1000 MG: 1 INJECTION, POWDER, LYOPHILIZED, FOR SOLUTION INTRAVENOUS at 20:53

## 2017-01-01 RX ADMIN — LORAZEPAM 1 MG: 2 INJECTION INTRAMUSCULAR; INTRAVENOUS at 15:43

## 2017-01-01 RX ADMIN — SODIUM CHLORIDE: 9 INJECTION, SOLUTION INTRAVENOUS at 17:16

## 2017-01-01 RX ADMIN — ZOLPIDEM TARTRATE 5 MG: 5 TABLET ORAL at 21:29

## 2017-01-01 RX ADMIN — LORAZEPAM 2 MG: 2 INJECTION INTRAMUSCULAR; INTRAVENOUS at 17:21

## 2017-01-01 RX ADMIN — Medication 10 ML: at 10:20

## 2017-01-01 RX ADMIN — PIPERACILLIN SODIUM,TAZOBACTAM SODIUM 3.38 G: 3; .375 INJECTION, POWDER, FOR SOLUTION INTRAVENOUS at 08:17

## 2017-01-01 RX ADMIN — FENTANYL CITRATE 50 MCG: 50 INJECTION, SOLUTION INTRAMUSCULAR; INTRAVENOUS at 13:09

## 2017-01-01 RX ADMIN — SODIUM CHLORIDE 1000 ML: 900 INJECTION, SOLUTION INTRAVENOUS at 09:05

## 2017-01-01 RX ADMIN — LOPERAMIDE HYDROCHLORIDE 2 MG: 2 CAPSULE ORAL at 04:13

## 2017-01-01 RX ADMIN — EPINEPHRINE 1 MG: 0.1 INJECTION, SOLUTION ENDOTRACHEAL; INTRACARDIAC; INTRAVENOUS at 17:00

## 2017-01-01 RX ADMIN — DOXYCYCLINE HYCLATE 100 MG: 100 CAPSULE ORAL at 08:30

## 2017-01-01 RX ADMIN — Medication 10 ML: at 00:31

## 2017-01-01 RX ADMIN — PIPERACILLIN SODIUM,TAZOBACTAM SODIUM 3.38 G: 3; .375 INJECTION, POWDER, FOR SOLUTION INTRAVENOUS at 17:52

## 2017-01-01 RX ADMIN — SODIUM CHLORIDE, PRESERVATIVE FREE 10 ML: 5 INJECTION INTRAVENOUS at 22:08

## 2017-01-01 RX ADMIN — SODIUM CHLORIDE 40 MG: 9 INJECTION INTRAMUSCULAR; INTRAVENOUS; SUBCUTANEOUS at 20:28

## 2017-01-01 RX ADMIN — HYDROMORPHONE HYDROCHLORIDE 1 MG: 1 INJECTION, SOLUTION INTRAMUSCULAR; INTRAVENOUS; SUBCUTANEOUS at 17:22

## 2017-01-01 RX ADMIN — ATENOLOL 50 MG: 50 TABLET ORAL at 09:14

## 2017-01-01 RX ADMIN — FENTANYL CITRATE 25 MCG: 50 INJECTION, SOLUTION INTRAMUSCULAR; INTRAVENOUS at 15:04

## 2017-01-01 RX ADMIN — SODIUM CHLORIDE, PRESERVATIVE FREE 300 UNITS: 5 INJECTION INTRAVENOUS at 01:36

## 2017-01-01 RX ADMIN — HYDROCODONE BITARTRATE AND ACETAMINOPHEN 1 TABLET: 10; 325 TABLET ORAL at 13:30

## 2017-01-01 RX ADMIN — LORAZEPAM 1 MG: 2 INJECTION INTRAMUSCULAR; INTRAVENOUS at 12:08

## 2017-01-01 RX ADMIN — SODIUM CHLORIDE, PRESERVATIVE FREE 300 UNITS: 5 INJECTION INTRAVENOUS at 10:40

## 2017-01-01 RX ADMIN — HYDROCODONE BITARTRATE AND ACETAMINOPHEN 1 TABLET: 10; 325 TABLET ORAL at 20:43

## 2017-01-01 RX ADMIN — MIDAZOLAM HYDROCHLORIDE 0.5 MG: 1 INJECTION, SOLUTION INTRAMUSCULAR; INTRAVENOUS at 15:46

## 2017-01-01 RX ADMIN — SODIUM CHLORIDE, PRESERVATIVE FREE 10 ML: 5 INJECTION INTRAVENOUS at 12:56

## 2017-01-01 RX ADMIN — SODIUM CHLORIDE, SODIUM LACTATE, POTASSIUM CHLORIDE, AND CALCIUM CHLORIDE 1000 ML: 600; 310; 30; 20 INJECTION, SOLUTION INTRAVENOUS at 15:05

## 2017-01-01 RX ADMIN — PIPERACILLIN SODIUM,TAZOBACTAM SODIUM 4.5 G: 4; .5 INJECTION, POWDER, FOR SOLUTION INTRAVENOUS at 09:45

## 2017-01-01 RX ADMIN — MIDAZOLAM HYDROCHLORIDE 1 MG: 1 INJECTION, SOLUTION INTRAMUSCULAR; INTRAVENOUS at 15:33

## 2017-01-01 RX ADMIN — MIDAZOLAM HYDROCHLORIDE 1 MG: 1 INJECTION, SOLUTION INTRAMUSCULAR; INTRAVENOUS at 12:58

## 2017-01-01 RX ADMIN — ZOLPIDEM TARTRATE 5 MG: 5 TABLET ORAL at 19:46

## 2017-01-01 RX ADMIN — SUCCINYLCHOLINE CHLORIDE 140 MG: 20 INJECTION INTRAMUSCULAR; INTRAVENOUS at 11:30

## 2017-01-01 RX ADMIN — CIPROFLOXACIN 400 MG: 2 INJECTION, SOLUTION INTRAVENOUS at 14:50

## 2017-01-01 RX ADMIN — SOYBEAN OIL 250 ML: 20 INJECTION, SOLUTION INTRAVENOUS at 17:43

## 2017-01-01 RX ADMIN — LIDOCAINE HYDROCHLORIDE 40 MG: 20 INJECTION, SOLUTION INFILTRATION; PERINEURAL at 12:52

## 2017-01-01 RX ADMIN — HYDROCODONE BITARTRATE AND ACETAMINOPHEN 1 TABLET: 10; 325 TABLET ORAL at 14:48

## 2017-01-01 RX ADMIN — Medication 10 ML: at 14:00

## 2017-01-01 RX ADMIN — RDII 250 MG CAPSULE 500 MG: at 08:39

## 2017-01-01 RX ADMIN — PIPERACILLIN SODIUM,TAZOBACTAM SODIUM 3.38 G: 3; .375 INJECTION, POWDER, FOR SOLUTION INTRAVENOUS at 16:16

## 2017-01-01 RX ADMIN — SODIUM CHLORIDE, PRESERVATIVE FREE 300 UNITS: 5 INJECTION INTRAVENOUS at 11:02

## 2017-01-01 RX ADMIN — Medication 10 ML: at 21:30

## 2017-01-01 RX ADMIN — MIDAZOLAM HYDROCHLORIDE 0.5 MG: 1 INJECTION, SOLUTION INTRAMUSCULAR; INTRAVENOUS at 16:16

## 2017-01-01 RX ADMIN — MUPIROCIN: 20 OINTMENT TOPICAL at 19:54

## 2017-01-01 RX ADMIN — FENTANYL CITRATE 25 MCG: 50 INJECTION, SOLUTION INTRAMUSCULAR; INTRAVENOUS at 15:46

## 2017-01-01 RX ADMIN — HYDROMORPHONE HYDROCHLORIDE 0.5 MG: 1 INJECTION, SOLUTION INTRAMUSCULAR; INTRAVENOUS; SUBCUTANEOUS at 02:24

## 2017-01-01 RX ADMIN — HYDROMORPHONE HYDROCHLORIDE 1 MG: 1 INJECTION, SOLUTION INTRAMUSCULAR; INTRAVENOUS; SUBCUTANEOUS at 10:33

## 2017-01-01 RX ADMIN — THIAMINE HYDROCHLORIDE 500 MG: 100 INJECTION, SOLUTION INTRAMUSCULAR; INTRAVENOUS at 16:59

## 2017-01-01 RX ADMIN — PIPERACILLIN SODIUM,TAZOBACTAM SODIUM 3.38 G: 3; .375 INJECTION, POWDER, FOR SOLUTION INTRAVENOUS at 00:28

## 2017-01-01 RX ADMIN — PROPOFOL 50 MG: 10 INJECTION, EMULSION INTRAVENOUS at 17:20

## 2017-01-01 RX ADMIN — HYDROMORPHONE HYDROCHLORIDE 1 MG: 1 INJECTION, SOLUTION INTRAMUSCULAR; INTRAVENOUS; SUBCUTANEOUS at 05:13

## 2017-01-01 RX ADMIN — ONDANSETRON 8 MG: 8 TABLET, ORALLY DISINTEGRATING ORAL at 12:10

## 2017-01-01 RX ADMIN — Medication 10 ML: at 21:05

## 2017-01-01 RX ADMIN — FENTANYL CITRATE 25 MCG: 50 INJECTION, SOLUTION INTRAMUSCULAR; INTRAVENOUS at 16:07

## 2017-01-01 RX ADMIN — DOXYCYCLINE HYCLATE 100 MG: 100 CAPSULE ORAL at 18:21

## 2017-01-01 RX ADMIN — ROCURONIUM BROMIDE 5 MG: 10 INJECTION, SOLUTION INTRAVENOUS at 16:18

## 2017-01-01 RX ADMIN — Medication 10 ML: at 06:30

## 2017-01-01 RX ADMIN — HYDROCODONE BITARTRATE AND ACETAMINOPHEN 1 TABLET: 10; 325 TABLET ORAL at 05:07

## 2017-01-01 RX ADMIN — SODIUM CHLORIDE, PRESERVATIVE FREE 10 ML: 5 INJECTION INTRAVENOUS at 10:40

## 2017-01-01 RX ADMIN — DOXYCYCLINE HYCLATE 100 MG: 100 CAPSULE ORAL at 21:59

## 2017-01-01 RX ADMIN — FENTANYL CITRATE 25 MCG: 50 INJECTION, SOLUTION INTRAMUSCULAR; INTRAVENOUS at 16:32

## 2017-01-01 RX ADMIN — FENTANYL CITRATE 50 MCG: 50 INJECTION, SOLUTION INTRAMUSCULAR; INTRAVENOUS at 16:24

## 2017-01-01 RX ADMIN — ACETAMINOPHEN 650 MG: 325 TABLET, FILM COATED ORAL at 04:13

## 2017-01-01 RX ADMIN — FLUCONAZOLE 400 MG: 100 TABLET ORAL at 10:38

## 2017-01-01 RX ADMIN — LORAZEPAM 2 MG: 2 INJECTION INTRAMUSCULAR; INTRAVENOUS at 15:24

## 2017-01-01 RX ADMIN — SODIUM CHLORIDE, PRESERVATIVE FREE 10 ML: 5 INJECTION INTRAVENOUS at 08:19

## 2017-01-01 RX ADMIN — DEXTROSE MONOHYDRATE AND SODIUM CHLORIDE 100 ML/HR: 5; .45 INJECTION, SOLUTION INTRAVENOUS at 00:09

## 2017-01-01 RX ADMIN — VANCOMYCIN HYDROCHLORIDE 750 MG: 10 INJECTION, POWDER, LYOPHILIZED, FOR SOLUTION INTRAVENOUS at 23:55

## 2017-01-01 RX ADMIN — SODIUM CHLORIDE, PRESERVATIVE FREE 10 ML: 5 INJECTION INTRAVENOUS at 08:24

## 2017-01-01 RX ADMIN — LORAZEPAM 2 MG: 2 INJECTION INTRAMUSCULAR; INTRAVENOUS at 10:39

## 2017-01-01 RX ADMIN — HYDROMORPHONE HYDROCHLORIDE 0.5 MG: 1 INJECTION, SOLUTION INTRAMUSCULAR; INTRAVENOUS; SUBCUTANEOUS at 01:34

## 2017-01-01 RX ADMIN — PROPOFOL 140 MCG/KG/MIN: 10 INJECTION, EMULSION INTRAVENOUS at 14:43

## 2017-01-01 RX ADMIN — STANDARDIZED SENNA CONCENTRATE AND DOCUSATE SODIUM 1 TABLET: 8.6; 5 TABLET, FILM COATED ORAL at 07:38

## 2017-01-01 RX ADMIN — LOPERAMIDE HYDROCHLORIDE 2 MG: 2 CAPSULE ORAL at 00:42

## 2017-01-01 RX ADMIN — PROPOFOL 200 MG: 10 INJECTION, EMULSION INTRAVENOUS at 11:30

## 2017-01-01 RX ADMIN — Medication 10 ML: at 11:47

## 2017-01-01 RX ADMIN — DEXTROSE MONOHYDRATE AND SODIUM CHLORIDE 100 ML/HR: 5; .45 INJECTION, SOLUTION INTRAVENOUS at 21:00

## 2017-01-01 RX ADMIN — INFLUENZA VIRUS VACCINE 0.5 ML: 15; 15; 15; 15 SUSPENSION INTRAMUSCULAR at 12:23

## 2017-01-01 RX ADMIN — HYDROCODONE BITARTRATE AND ACETAMINOPHEN 1 TABLET: 10; 325 TABLET ORAL at 16:36

## 2017-01-01 RX ADMIN — SUCRALFATE 1 G: 1 TABLET ORAL at 10:38

## 2017-01-01 RX ADMIN — SUCRALFATE 1 G: 1 TABLET ORAL at 17:10

## 2017-01-01 RX ADMIN — FENTANYL CITRATE 25 MCG: 50 INJECTION, SOLUTION INTRAMUSCULAR; INTRAVENOUS at 16:36

## 2017-01-01 RX ADMIN — SODIUM CHLORIDE, PRESERVATIVE FREE 10 ML: 5 INJECTION INTRAVENOUS at 14:39

## 2017-01-01 RX ADMIN — RISPERIDONE 0.25 MG: 0.5 TABLET ORAL at 10:05

## 2017-01-01 RX ADMIN — FLUCONAZOLE 400 MG: 100 TABLET ORAL at 08:18

## 2017-01-01 RX ADMIN — Medication 400 MG: at 07:38

## 2017-01-01 RX ADMIN — SODIUM CHLORIDE, PRESERVATIVE FREE 300 UNITS: 5 INJECTION INTRAVENOUS at 08:24

## 2017-01-01 RX ADMIN — LORAZEPAM 2 MG: 2 INJECTION INTRAMUSCULAR; INTRAVENOUS at 05:13

## 2017-01-01 RX ADMIN — HYDROMORPHONE HYDROCHLORIDE 1 MG: 1 INJECTION, SOLUTION INTRAMUSCULAR; INTRAVENOUS; SUBCUTANEOUS at 07:26

## 2017-01-01 RX ADMIN — PROMETHAZINE HYDROCHLORIDE 25 MG: 25 INJECTION INTRAMUSCULAR; INTRAVENOUS at 12:00

## 2017-01-01 RX ADMIN — SODIUM CHLORIDE, PRESERVATIVE FREE 300 UNITS: 5 INJECTION INTRAVENOUS at 12:56

## 2017-01-01 RX ADMIN — HYDROMORPHONE HYDROCHLORIDE 0.5 MG: 2 INJECTION, SOLUTION INTRAMUSCULAR; INTRAVENOUS; SUBCUTANEOUS at 13:53

## 2017-01-01 RX ADMIN — HYDROCODONE BITARTRATE AND ACETAMINOPHEN 1 TABLET: 10; 325 TABLET ORAL at 08:18

## 2017-01-01 RX ADMIN — SUCRALFATE 1 G: 1 TABLET ORAL at 08:12

## 2017-01-01 RX ADMIN — VANCOMYCIN HYDROCHLORIDE 1250 MG: 10 INJECTION, POWDER, LYOPHILIZED, FOR SOLUTION INTRAVENOUS at 09:14

## 2017-01-01 RX ADMIN — DEXAMETHASONE SODIUM PHOSPHATE 2 MG: 4 INJECTION, SOLUTION INTRAMUSCULAR; INTRAVENOUS at 08:24

## 2017-01-01 RX ADMIN — SODIUM BICARBONATE 2 ML: 0.2 INJECTION, SOLUTION INTRAVENOUS at 10:38

## 2017-01-01 RX ADMIN — SODIUM CHLORIDE, PRESERVATIVE FREE 300 UNITS: 5 INJECTION INTRAVENOUS at 12:09

## 2017-01-01 RX ADMIN — PIPERACILLIN SODIUM,TAZOBACTAM SODIUM 3.38 G: 3; .375 INJECTION, POWDER, FOR SOLUTION INTRAVENOUS at 09:05

## 2017-01-01 RX ADMIN — Medication 10 ML: at 18:00

## 2017-01-01 RX ADMIN — AMOXICILLIN AND CLAVULANATE POTASSIUM 1 TABLET: 875; 125 TABLET, FILM COATED ORAL at 21:59

## 2017-01-01 RX ADMIN — SUCRALFATE 1 G: 1 TABLET ORAL at 16:21

## 2017-01-01 RX ADMIN — SODIUM CHLORIDE 100 ML/HR: 900 INJECTION, SOLUTION INTRAVENOUS at 14:00

## 2017-01-01 RX ADMIN — PEGFILGRASTIM 6 MG: 6 INJECTION SUBCUTANEOUS at 14:42

## 2017-01-01 RX ADMIN — Medication 10 ML: at 10:40

## 2017-01-01 RX ADMIN — SODIUM CHLORIDE, PRESERVATIVE FREE 500 UNITS: 5 INJECTION INTRAVENOUS at 14:40

## 2017-01-01 RX ADMIN — MUPIROCIN: 20 OINTMENT TOPICAL at 09:00

## 2017-01-01 RX ADMIN — Medication 10 ML: at 16:05

## 2017-01-01 RX ADMIN — HYDROMORPHONE HYDROCHLORIDE 1 MG: 1 INJECTION, SOLUTION INTRAMUSCULAR; INTRAVENOUS; SUBCUTANEOUS at 12:08

## 2017-01-01 RX ADMIN — DEXAMETHASONE 1 MG: 0.5 TABLET ORAL at 09:14

## 2017-01-01 RX ADMIN — VANCOMYCIN HYDROCHLORIDE 1000 MG: 1 INJECTION, POWDER, LYOPHILIZED, FOR SOLUTION INTRAVENOUS at 08:44

## 2017-01-01 RX ADMIN — SODIUM CHLORIDE, PRESERVATIVE FREE 10 ML: 5 INJECTION INTRAVENOUS at 16:22

## 2017-01-01 RX ADMIN — FENTANYL CITRATE 25 MCG: 50 INJECTION, SOLUTION INTRAMUSCULAR; INTRAVENOUS at 12:48

## 2017-01-01 RX ADMIN — DEXAMETHASONE 1 MG: 0.5 TABLET ORAL at 08:30

## 2017-01-01 RX ADMIN — MIDAZOLAM HYDROCHLORIDE 1 MG: 1 INJECTION, SOLUTION INTRAMUSCULAR; INTRAVENOUS at 13:09

## 2017-01-01 RX ADMIN — SUCRALFATE 1 G: 1 TABLET ORAL at 19:46

## 2017-01-01 RX ADMIN — FOLIC ACID: 5 INJECTION, SOLUTION INTRAMUSCULAR; INTRAVENOUS; SUBCUTANEOUS at 18:00

## 2017-01-01 RX ADMIN — LORAZEPAM 1 MG: 2 INJECTION INTRAMUSCULAR; INTRAVENOUS at 22:08

## 2017-01-01 RX ADMIN — SOYBEAN OIL 250 ML: 20 INJECTION, SOLUTION INTRAVENOUS at 20:31

## 2017-01-01 RX ADMIN — DIPHENHYDRAMINE HYDROCHLORIDE 25 MG: 25 CAPSULE ORAL at 04:13

## 2017-01-01 RX ADMIN — SODIUM CHLORIDE 40 MG: 9 INJECTION INTRAMUSCULAR; INTRAVENOUS; SUBCUTANEOUS at 19:46

## 2017-01-01 RX ADMIN — ONDANSETRON 4 MG: 2 INJECTION INTRAMUSCULAR; INTRAVENOUS at 15:04

## 2017-01-01 RX ADMIN — DEXAMETHASONE 1 MG: 0.5 TABLET ORAL at 08:51

## 2017-01-01 RX ADMIN — SODIUM BICARBONATE 2 ML: 0.2 INJECTION, SOLUTION INTRAVENOUS at 15:10

## 2017-01-01 RX ADMIN — LORAZEPAM 1 MG: 2 INJECTION INTRAMUSCULAR; INTRAVENOUS at 07:15

## 2017-01-01 RX ADMIN — Medication 10 ML: at 08:42

## 2017-01-01 RX ADMIN — LORAZEPAM 1 MG: 2 INJECTION INTRAMUSCULAR; INTRAVENOUS at 20:53

## 2017-01-01 RX ADMIN — SODIUM CHLORIDE, PRESERVATIVE FREE 10 ML: 5 INJECTION INTRAVENOUS at 17:22

## 2017-01-01 RX ADMIN — SODIUM CHLORIDE 100 ML/HR: 900 INJECTION, SOLUTION INTRAVENOUS at 08:00

## 2017-01-01 RX ADMIN — Medication 10 ML: at 17:48

## 2017-01-01 RX ADMIN — LOPERAMIDE HYDROCHLORIDE 2 MG: 2 CAPSULE ORAL at 09:32

## 2017-01-01 RX ADMIN — MIDAZOLAM HYDROCHLORIDE 1 MG: 1 INJECTION, SOLUTION INTRAMUSCULAR; INTRAVENOUS at 12:48

## 2017-01-01 RX ADMIN — SUCRALFATE 1 G: 1 TABLET ORAL at 17:52

## 2017-01-01 RX ADMIN — FENTANYL CITRATE 50 MCG: 50 INJECTION, SOLUTION INTRAMUSCULAR; INTRAVENOUS at 16:12

## 2017-01-01 RX ADMIN — SODIUM CHLORIDE 75 ML/HR: 900 INJECTION, SOLUTION INTRAVENOUS at 03:34

## 2017-01-01 RX ADMIN — Medication 10 ML: at 05:23

## 2017-01-01 RX ADMIN — VANCOMYCIN HYDROCHLORIDE 1000 MG: 1 INJECTION, POWDER, LYOPHILIZED, FOR SOLUTION INTRAVENOUS at 22:05

## 2017-01-01 RX ADMIN — FENTANYL CITRATE 50 MCG: 50 INJECTION, SOLUTION INTRAMUSCULAR; INTRAVENOUS at 14:52

## 2017-01-01 RX ADMIN — DEXAMETHASONE 1 MG: 0.5 TABLET ORAL at 08:38

## 2017-01-01 RX ADMIN — DEXAMETHASONE 1 MG: 0.5 TABLET ORAL at 08:11

## 2017-01-01 RX ADMIN — PROPOFOL 200 MG: 10 INJECTION, EMULSION INTRAVENOUS at 16:18

## 2017-01-01 RX ADMIN — HYDROCODONE BITARTRATE AND ACETAMINOPHEN 1 TABLET: 10; 325 TABLET ORAL at 09:36

## 2017-01-01 RX ADMIN — VANCOMYCIN HYDROCHLORIDE 750 MG: 10 INJECTION, POWDER, LYOPHILIZED, FOR SOLUTION INTRAVENOUS at 09:07

## 2017-01-01 RX ADMIN — DEXAMETHASONE 1 MG: 0.5 TABLET ORAL at 08:18

## 2017-01-01 RX ADMIN — SODIUM CHLORIDE: 234 INJECTION INTRAMUSCULAR; INTRAVENOUS; SUBCUTANEOUS at 20:30

## 2017-01-01 RX ADMIN — LIDOCAINE HYDROCHLORIDE 100 MG: 20 INJECTION, SOLUTION EPIDURAL; INFILTRATION; INTRACAUDAL; PERINEURAL at 16:18

## 2017-01-01 RX ADMIN — SODIUM CHLORIDE 40 MG: 9 INJECTION INTRAMUSCULAR; INTRAVENOUS; SUBCUTANEOUS at 21:40

## 2017-01-01 RX ADMIN — SODIUM CHLORIDE: 900 INJECTION, SOLUTION INTRAVENOUS at 15:35

## 2017-01-01 RX ADMIN — LOPERAMIDE HYDROCHLORIDE 2 MG: 2 CAPSULE ORAL at 14:49

## 2017-01-01 RX ADMIN — Medication 10 ML: at 16:00

## 2017-01-01 RX ADMIN — MUPIROCIN: 20 OINTMENT TOPICAL at 17:55

## 2017-01-01 RX ADMIN — VANCOMYCIN HYDROCHLORIDE 750 MG: 10 INJECTION, POWDER, LYOPHILIZED, FOR SOLUTION INTRAVENOUS at 17:00

## 2017-01-01 RX ADMIN — Medication 10 ML: at 13:38

## 2017-01-01 RX ADMIN — SODIUM CHLORIDE, SODIUM LACTATE, POTASSIUM CHLORIDE, CALCIUM CHLORIDE: 600; 310; 30; 20 INJECTION, SOLUTION INTRAVENOUS at 14:35

## 2017-01-01 RX ADMIN — RISPERIDONE 0.25 MG: 0.5 TABLET ORAL at 08:43

## 2017-01-01 RX ADMIN — LIDOCAINE HYDROCHLORIDE 200 MG: 20 INJECTION, SOLUTION INFILTRATION; PERINEURAL at 15:10

## 2017-01-01 RX ADMIN — HYDROMORPHONE HYDROCHLORIDE 1 MG: 1 INJECTION, SOLUTION INTRAMUSCULAR; INTRAVENOUS; SUBCUTANEOUS at 12:56

## 2017-01-01 RX ADMIN — SODIUM CHLORIDE, PRESERVATIVE FREE 300 UNITS: 5 INJECTION INTRAVENOUS at 08:19

## 2017-01-01 RX ADMIN — Medication 20 ML: at 12:15

## 2017-01-01 RX ADMIN — SODIUM CHLORIDE, PRESERVATIVE FREE 10 ML: 5 INJECTION INTRAVENOUS at 12:45

## 2017-01-01 RX ADMIN — HYDROCODONE BITARTRATE AND ACETAMINOPHEN 1 TABLET: 10; 325 TABLET ORAL at 08:30

## 2017-01-01 RX ADMIN — MIDAZOLAM HYDROCHLORIDE 1 MG: 1 INJECTION, SOLUTION INTRAMUSCULAR; INTRAVENOUS at 14:52

## 2017-01-01 RX ADMIN — ROCURONIUM BROMIDE 5 MG: 10 INJECTION, SOLUTION INTRAVENOUS at 11:30

## 2017-01-01 RX ADMIN — SODIUM CHLORIDE, SODIUM LACTATE, POTASSIUM CHLORIDE, CALCIUM CHLORIDE: 600; 310; 30; 20 INJECTION, SOLUTION INTRAVENOUS at 17:01

## 2017-01-01 RX ADMIN — LORAZEPAM 2 MG: 2 INJECTION INTRAMUSCULAR; INTRAVENOUS at 07:26

## 2017-01-01 RX ADMIN — PIPERACILLIN SODIUM,TAZOBACTAM SODIUM 3.38 G: 3; .375 INJECTION, POWDER, FOR SOLUTION INTRAVENOUS at 01:54

## 2017-01-01 RX ADMIN — PIPERACILLIN SODIUM,TAZOBACTAM SODIUM 2.25 G: 2; .25 INJECTION, POWDER, FOR SOLUTION INTRAVENOUS at 19:49

## 2017-01-01 RX ADMIN — SODIUM CHLORIDE 75 ML/HR: 900 INJECTION, SOLUTION INTRAVENOUS at 19:59

## 2017-01-01 RX ADMIN — FENTANYL CITRATE 25 MCG: 50 INJECTION, SOLUTION INTRAMUSCULAR; INTRAVENOUS at 12:40

## 2017-01-01 RX ADMIN — DEXTROSE MONOHYDRATE AND SODIUM CHLORIDE 100 ML/HR: 5; .45 INJECTION, SOLUTION INTRAVENOUS at 21:10

## 2017-01-01 RX ADMIN — FAMOTIDINE 20 MG: 10 INJECTION, SOLUTION INTRAVENOUS at 15:05

## 2017-01-01 RX ADMIN — MUPIROCIN: 20 OINTMENT TOPICAL at 03:10

## 2017-01-01 RX ADMIN — HYDROCODONE BITARTRATE AND ACETAMINOPHEN 1 TABLET: 10; 325 TABLET ORAL at 11:35

## 2017-01-01 RX ADMIN — DEXAMETHASONE SODIUM PHOSPHATE 2 MG: 4 INJECTION, SOLUTION INTRAMUSCULAR; INTRAVENOUS at 08:18

## 2017-01-01 RX ADMIN — DOXYCYCLINE HYCLATE 100 MG: 100 CAPSULE ORAL at 21:11

## 2017-01-01 RX ADMIN — ONDANSETRON 4 MG: 2 INJECTION INTRAMUSCULAR; INTRAVENOUS at 11:41

## 2017-01-01 RX ADMIN — DIATRIZOATE MEGLUMINE AND DIATRIZOATE SODIUM 15 ML: 600; 100 SOLUTION ORAL; RECTAL at 10:20

## 2017-01-01 RX ADMIN — SODIUM CHLORIDE, PRESERVATIVE FREE 10 ML: 5 INJECTION INTRAVENOUS at 03:38

## 2017-01-01 RX ADMIN — SODIUM CHLORIDE 500 ML: 900 INJECTION, SOLUTION INTRAVENOUS at 16:40

## 2017-01-01 RX ADMIN — AMOXICILLIN AND CLAVULANATE POTASSIUM 1 TABLET: 875; 125 TABLET, FILM COATED ORAL at 08:31

## 2017-01-01 RX ADMIN — PIPERACILLIN SODIUM,TAZOBACTAM SODIUM 3.38 G: 3; .375 INJECTION, POWDER, FOR SOLUTION INTRAVENOUS at 01:16

## 2017-01-01 RX ADMIN — Medication 10 ML: at 15:00

## 2017-01-01 RX ADMIN — SODIUM CHLORIDE, PRESERVATIVE FREE 300 UNITS: 5 INJECTION INTRAVENOUS at 15:00

## 2017-01-01 RX ADMIN — HYDROCODONE BITARTRATE AND ACETAMINOPHEN 1 TABLET: 10; 325 TABLET ORAL at 14:44

## 2017-01-01 RX ADMIN — FENTANYL CITRATE 50 MCG: 50 INJECTION, SOLUTION INTRAMUSCULAR; INTRAVENOUS at 12:58

## 2017-01-01 RX ADMIN — HALOPERIDOL LACTATE 2 MG: 5 INJECTION, SOLUTION INTRAMUSCULAR at 14:40

## 2017-01-01 RX ADMIN — HALOPERIDOL LACTATE 2 MG: 5 INJECTION, SOLUTION INTRAMUSCULAR at 19:20

## 2017-01-01 RX ADMIN — Medication 500 UNITS: at 14:40

## 2017-01-01 RX ADMIN — ACETAMINOPHEN 650 MG: 325 TABLET, FILM COATED ORAL at 13:31

## 2017-01-01 RX ADMIN — SODIUM CHLORIDE 100 ML/HR: 900 INJECTION, SOLUTION INTRAVENOUS at 22:00

## 2017-01-01 RX ADMIN — METOCLOPRAMIDE 10 MG: 5 INJECTION, SOLUTION INTRAMUSCULAR; INTRAVENOUS at 12:40

## 2017-01-01 RX ADMIN — FENTANYL CITRATE 50 MCG: 50 INJECTION, SOLUTION INTRAMUSCULAR; INTRAVENOUS at 11:45

## 2017-01-01 RX ADMIN — HYDROCODONE BITARTRATE AND ACETAMINOPHEN 1 TABLET: 10; 325 TABLET ORAL at 19:14

## 2017-01-01 RX ADMIN — SODIUM CHLORIDE, PRESERVATIVE FREE 300 UNITS: 5 INJECTION INTRAVENOUS at 15:25

## 2017-01-01 RX ADMIN — HYDROCODONE BITARTRATE AND ACETAMINOPHEN 1 TABLET: 10; 325 TABLET ORAL at 08:53

## 2017-01-01 RX ADMIN — PACLITAXEL 153.6 MG: 100 INJECTION, POWDER, LYOPHILIZED, FOR SUSPENSION INTRAVENOUS at 13:20

## 2017-01-01 RX ADMIN — SODIUM CHLORIDE 25 ML/HR: 900 INJECTION, SOLUTION INTRAVENOUS at 14:46

## 2017-01-01 RX ADMIN — PIPERACILLIN SODIUM,TAZOBACTAM SODIUM 3.38 G: 3; .375 INJECTION, POWDER, FOR SOLUTION INTRAVENOUS at 10:19

## 2017-01-01 RX ADMIN — SODIUM BICARBONATE 2 ML: 0.2 INJECTION, SOLUTION INTRAVENOUS at 15:00

## 2017-01-01 RX ADMIN — Medication 10 ML: at 14:40

## 2017-01-01 RX ADMIN — DIPHENHYDRAMINE HYDROCHLORIDE 25 MG: 25 CAPSULE ORAL at 13:31

## 2017-01-01 RX ADMIN — ALBUMIN (HUMAN) 25 G: 12.5 INJECTION, SOLUTION INTRAVENOUS at 14:06

## 2017-01-01 RX ADMIN — Medication 10 ML: at 22:03

## 2017-01-01 RX ADMIN — SODIUM CHLORIDE, PRESERVATIVE FREE 10 ML: 5 INJECTION INTRAVENOUS at 11:02

## 2017-01-01 RX ADMIN — Medication 10 ML: at 05:48

## 2017-01-01 RX ADMIN — ZOLPIDEM TARTRATE 5 MG: 5 TABLET ORAL at 22:09

## 2017-01-01 RX ADMIN — MAGNESIUM SULFATE HEPTAHYDRATE: 500 INJECTION, SOLUTION INTRAMUSCULAR; INTRAVENOUS at 17:43

## 2017-01-01 RX ADMIN — FLUCONAZOLE 400 MG: 100 TABLET ORAL at 09:14

## 2017-01-01 RX ADMIN — PIPERACILLIN SODIUM,TAZOBACTAM SODIUM 3.38 G: 3; .375 INJECTION, POWDER, FOR SOLUTION INTRAVENOUS at 00:41

## 2017-01-01 RX ADMIN — Medication 10 ML: at 10:23

## 2017-01-01 RX ADMIN — PACLITAXEL 153.6 MG: 100 INJECTION, POWDER, LYOPHILIZED, FOR SUSPENSION INTRAVENOUS at 13:22

## 2017-01-01 RX ADMIN — SODIUM CHLORIDE 1536 MG: 900 INJECTION, SOLUTION INTRAVENOUS at 14:00

## 2017-01-01 RX ADMIN — LORAZEPAM 2 MG: 2 INJECTION INTRAMUSCULAR; INTRAVENOUS at 06:16

## 2017-01-01 RX ADMIN — ENOXAPARIN SODIUM 40 MG: 40 INJECTION SUBCUTANEOUS at 20:03

## 2017-01-01 RX ADMIN — LIDOCAINE HYDROCHLORIDE 200 MG: 20 INJECTION, SOLUTION INFILTRATION; PERINEURAL at 15:30

## 2017-01-01 RX ADMIN — DEXAMETHASONE 1 MG: 0.5 TABLET ORAL at 09:05

## 2017-01-01 RX ADMIN — HALOPERIDOL LACTATE 2 MG: 5 INJECTION, SOLUTION INTRAMUSCULAR at 22:35

## 2017-01-01 RX ADMIN — Medication 10 ML: at 23:59

## 2017-01-01 RX ADMIN — HYDROMORPHONE HYDROCHLORIDE 0.5 MG: 1 INJECTION, SOLUTION INTRAMUSCULAR; INTRAVENOUS; SUBCUTANEOUS at 14:09

## 2017-01-01 RX ADMIN — AMOXICILLIN AND CLAVULANATE POTASSIUM 1 TABLET: 875; 125 TABLET, FILM COATED ORAL at 21:43

## 2017-01-01 RX ADMIN — VANCOMYCIN HYDROCHLORIDE 1250 MG: 10 INJECTION, POWDER, LYOPHILIZED, FOR SOLUTION INTRAVENOUS at 21:39

## 2017-01-01 RX ADMIN — RDII 250 MG CAPSULE 500 MG: at 17:48

## 2017-01-01 RX ADMIN — SODIUM CHLORIDE, PRESERVATIVE FREE 300 UNITS: 5 INJECTION INTRAVENOUS at 07:27

## 2017-01-01 RX ADMIN — SODIUM CHLORIDE, PRESERVATIVE FREE 300 UNITS: 5 INJECTION INTRAVENOUS at 06:16

## 2017-01-01 RX ADMIN — CALCIUM GLUCONATE: 94 INJECTION, SOLUTION INTRAVENOUS at 18:38

## 2017-01-01 RX ADMIN — SODIUM CHLORIDE 200 MG: 9 INJECTION, SOLUTION INTRAVENOUS at 17:00

## 2017-01-01 RX ADMIN — PIPERACILLIN SODIUM,TAZOBACTAM SODIUM 3.38 G: 3; .375 INJECTION, POWDER, FOR SOLUTION INTRAVENOUS at 03:37

## 2017-01-01 RX ADMIN — SUCRALFATE 1 G: 1 TABLET ORAL at 10:19

## 2017-01-01 RX ADMIN — PIPERACILLIN SODIUM,TAZOBACTAM SODIUM 3.38 G: 3; .375 INJECTION, POWDER, FOR SOLUTION INTRAVENOUS at 17:48

## 2017-01-01 RX ADMIN — SODIUM BICARBONATE 1 ML: 0.2 INJECTION, SOLUTION INTRAVENOUS at 12:52

## 2017-01-01 RX ADMIN — RISPERIDONE 0.25 MG: 0.5 TABLET ORAL at 17:10

## 2017-01-01 RX ADMIN — HYDROCODONE BITARTRATE AND ACETAMINOPHEN 1 TABLET: 10; 325 TABLET ORAL at 16:43

## 2017-01-01 RX ADMIN — HYDROMORPHONE HYDROCHLORIDE 1 MG: 1 INJECTION, SOLUTION INTRAMUSCULAR; INTRAVENOUS; SUBCUTANEOUS at 08:24

## 2017-01-01 RX ADMIN — SODIUM CHLORIDE 100 ML/HR: 900 INJECTION, SOLUTION INTRAVENOUS at 12:25

## 2017-01-01 RX ADMIN — PANTOPRAZOLE SODIUM 40 MG: 40 TABLET, DELAYED RELEASE ORAL at 09:14

## 2017-01-01 RX ADMIN — LIDOCAINE HYDROCHLORIDE 200 MG: 20 INJECTION, SOLUTION INFILTRATION; PERINEURAL at 15:00

## 2017-01-01 RX ADMIN — HYDROMORPHONE HYDROCHLORIDE 1 MG: 1 INJECTION, SOLUTION INTRAMUSCULAR; INTRAVENOUS; SUBCUTANEOUS at 12:18

## 2017-01-01 RX ADMIN — Medication 20 ML: at 16:45

## 2017-01-01 RX ADMIN — SODIUM CHLORIDE, PRESERVATIVE FREE 10 ML: 5 INJECTION INTRAVENOUS at 15:25

## 2017-01-01 RX ADMIN — HYDROCODONE BITARTRATE AND ACETAMINOPHEN 1 TABLET: 10; 325 TABLET ORAL at 09:32

## 2017-01-01 RX ADMIN — SUCRALFATE 1 G: 1 TABLET ORAL at 08:39

## 2017-01-01 RX ADMIN — SUCRALFATE 1 G: 1 TABLET ORAL at 21:48

## 2017-01-01 RX ADMIN — CIPROFLOXACIN 400 MG: 2 INJECTION, SOLUTION INTRAVENOUS at 13:09

## 2017-01-01 RX ADMIN — SODIUM CHLORIDE 40 MG: 9 INJECTION INTRAMUSCULAR; INTRAVENOUS; SUBCUTANEOUS at 21:59

## 2017-01-01 RX ADMIN — LORAZEPAM 1 MG: 2 INJECTION INTRAMUSCULAR; INTRAVENOUS at 12:45

## 2017-01-01 RX ADMIN — BUPIVACAINE HYDROCHLORIDE 5 MG: 5 INJECTION, SOLUTION EPIDURAL; INTRACAUDAL at 16:22

## 2017-01-01 RX ADMIN — SODIUM CHLORIDE 40 MG: 9 INJECTION INTRAMUSCULAR; INTRAVENOUS; SUBCUTANEOUS at 20:02

## 2017-01-01 RX ADMIN — FENTANYL CITRATE 25 MCG: 50 INJECTION, SOLUTION INTRAMUSCULAR; INTRAVENOUS at 15:13

## 2017-01-01 RX ADMIN — SUCRALFATE 1 G: 1 TABLET ORAL at 19:53

## 2017-01-01 RX ADMIN — ALBUMIN (HUMAN) 25 G: 12.5 INJECTION, SOLUTION INTRAVENOUS at 05:27

## 2017-01-01 RX ADMIN — SUCRALFATE 1 G: 1 TABLET ORAL at 21:29

## 2017-01-01 RX ADMIN — PIPERACILLIN SODIUM,TAZOBACTAM SODIUM 3.38 G: 3; .375 INJECTION, POWDER, FOR SOLUTION INTRAVENOUS at 17:00

## 2017-01-01 RX ADMIN — SODIUM CHLORIDE 1536 MG: 900 INJECTION, SOLUTION INTRAVENOUS at 14:08

## 2017-01-01 RX ADMIN — SODIUM CHLORIDE, PRESERVATIVE FREE 10 ML: 5 INJECTION INTRAVENOUS at 05:13

## 2017-01-01 RX ADMIN — PIPERACILLIN SODIUM,TAZOBACTAM SODIUM 3.38 G: 3; .375 INJECTION, POWDER, FOR SOLUTION INTRAVENOUS at 00:48

## 2017-01-01 RX ADMIN — SODIUM CHLORIDE 500 ML: 900 INJECTION, SOLUTION INTRAVENOUS at 12:22

## 2017-01-01 RX ADMIN — Medication 10 ML: at 06:00

## 2017-01-01 RX ADMIN — LIDOCAINE HYDROCHLORIDE 100 MG: 20 INJECTION, SOLUTION INFILTRATION; PERINEURAL at 10:36

## 2017-01-01 RX ADMIN — DEXAMETHASONE 1 MG: 0.5 TABLET ORAL at 10:19

## 2017-01-01 RX ADMIN — METHYLPREDNISOLONE ACETATE 40 MG: 40 INJECTION, SUSPENSION INTRA-ARTICULAR; INTRALESIONAL; INTRAMUSCULAR; SOFT TISSUE at 16:22

## 2017-01-01 RX ADMIN — Medication 500 UNITS: at 16:46

## 2017-01-01 RX ADMIN — IOPAMIDOL 80 ML: 612 INJECTION, SOLUTION INTRAVENOUS at 16:33

## 2017-01-01 RX ADMIN — DEXAMETHASONE SODIUM PHOSPHATE 4 MG: 4 INJECTION, SOLUTION INTRA-ARTICULAR; INTRALESIONAL; INTRAMUSCULAR; INTRAVENOUS; SOFT TISSUE at 16:24

## 2017-01-01 RX ADMIN — POLYETHYLENE GLYCOL 3350 17 G: 17 POWDER, FOR SOLUTION ORAL at 08:43

## 2017-01-01 RX ADMIN — HYDROMORPHONE HYDROCHLORIDE 1 MG: 1 INJECTION, SOLUTION INTRAMUSCULAR; INTRAVENOUS; SUBCUTANEOUS at 14:38

## 2017-01-01 RX ADMIN — PIPERACILLIN SODIUM,TAZOBACTAM SODIUM 3.38 G: 3; .375 INJECTION, POWDER, FOR SOLUTION INTRAVENOUS at 10:30

## 2017-01-01 RX ADMIN — MUPIROCIN: 20 OINTMENT TOPICAL at 08:12

## 2017-01-01 RX ADMIN — Medication 10 ML: at 13:23

## 2017-01-01 RX ADMIN — PIPERACILLIN SODIUM,TAZOBACTAM SODIUM 3.38 G: 3; .375 INJECTION, POWDER, FOR SOLUTION INTRAVENOUS at 09:39

## 2017-01-01 RX ADMIN — HYDROMORPHONE HYDROCHLORIDE 0.5 MG: 1 INJECTION, SOLUTION INTRAMUSCULAR; INTRAVENOUS; SUBCUTANEOUS at 07:33

## 2017-01-01 RX ADMIN — FENTANYL CITRATE 50 MCG: 50 INJECTION, SOLUTION INTRAMUSCULAR; INTRAVENOUS at 15:32

## 2017-01-01 RX ADMIN — SODIUM CHLORIDE, PRESERVATIVE FREE 10 ML: 5 INJECTION INTRAVENOUS at 12:08

## 2017-01-01 RX ADMIN — SODIUM CHLORIDE 250 ML: 900 INJECTION, SOLUTION INTRAVENOUS at 12:15

## 2017-01-01 RX ADMIN — MIDAZOLAM HYDROCHLORIDE 1 MG: 1 INJECTION, SOLUTION INTRAMUSCULAR; INTRAVENOUS at 16:07

## 2017-01-01 RX ADMIN — HYDROMORPHONE HYDROCHLORIDE 0.5 MG: 1 INJECTION, SOLUTION INTRAMUSCULAR; INTRAVENOUS; SUBCUTANEOUS at 11:01

## 2017-01-01 RX ADMIN — FENTANYL CITRATE 50 MCG: 50 INJECTION, SOLUTION INTRAMUSCULAR; INTRAVENOUS at 11:22

## 2017-01-01 RX ADMIN — SODIUM CHLORIDE 40 MG: 9 INJECTION INTRAMUSCULAR; INTRAVENOUS; SUBCUTANEOUS at 21:48

## 2017-01-01 RX ADMIN — Medication 10 ML: at 12:10

## 2017-01-01 RX ADMIN — SODIUM CHLORIDE, PRESERVATIVE FREE 10 ML: 5 INJECTION INTRAVENOUS at 07:27

## 2017-01-01 RX ADMIN — PIPERACILLIN SODIUM,TAZOBACTAM SODIUM 3.38 G: 3; .375 INJECTION, POWDER, FOR SOLUTION INTRAVENOUS at 20:04

## 2017-01-01 RX ADMIN — SODIUM CHLORIDE 40 MG: 9 INJECTION INTRAMUSCULAR; INTRAVENOUS; SUBCUTANEOUS at 22:05

## 2017-01-01 RX ADMIN — SODIUM CHLORIDE 500 ML: 900 INJECTION, SOLUTION INTRAVENOUS at 12:45

## 2017-01-01 RX ADMIN — Medication 10 ML: at 16:39

## 2017-01-01 RX ADMIN — MUPIROCIN: 20 OINTMENT TOPICAL at 17:35

## 2017-01-01 RX ADMIN — LOPERAMIDE HYDROCHLORIDE 2 MG: 2 CAPSULE ORAL at 08:17

## 2017-01-01 RX ADMIN — SUCCINYLCHOLINE CHLORIDE 160 MG: 20 INJECTION INTRAMUSCULAR; INTRAVENOUS at 16:18

## 2017-01-01 RX ADMIN — FLUCONAZOLE 400 MG: 100 TABLET ORAL at 08:30

## 2017-01-01 RX ADMIN — METOCLOPRAMIDE 10 MG: 5 INJECTION, SOLUTION INTRAMUSCULAR; INTRAVENOUS at 12:31

## 2017-01-01 RX ADMIN — SODIUM CHLORIDE 500 ML: 900 INJECTION, SOLUTION INTRAVENOUS at 12:32

## 2017-01-01 RX ADMIN — SODIUM CHLORIDE: 900 INJECTION, SOLUTION INTRAVENOUS at 14:45

## 2017-01-01 RX ADMIN — MUPIROCIN: 20 OINTMENT TOPICAL at 08:38

## 2017-01-01 RX ADMIN — HYDROMORPHONE HYDROCHLORIDE 0.5 MG: 1 INJECTION, SOLUTION INTRAMUSCULAR; INTRAVENOUS; SUBCUTANEOUS at 08:18

## 2017-01-01 RX ADMIN — PIPERACILLIN SODIUM,TAZOBACTAM SODIUM 3.38 G: 3; .375 INJECTION, POWDER, FOR SOLUTION INTRAVENOUS at 13:05

## 2017-01-01 RX ADMIN — SODIUM CHLORIDE 100 ML: 900 INJECTION, SOLUTION INTRAVENOUS at 11:47

## 2017-01-01 RX ADMIN — Medication 10 ML: at 12:00

## 2017-01-01 RX ADMIN — HYDROMORPHONE HYDROCHLORIDE 0.5 MG: 1 INJECTION, SOLUTION INTRAMUSCULAR; INTRAVENOUS; SUBCUTANEOUS at 16:21

## 2017-01-01 RX ADMIN — Medication 10 ML: at 20:36

## 2017-01-01 RX ADMIN — Medication 10 ML: at 06:38

## 2017-01-01 RX ADMIN — SODIUM CHLORIDE, PRESERVATIVE FREE 300 UNITS: 5 INJECTION INTRAVENOUS at 17:22

## 2017-01-01 RX ADMIN — STANDARDIZED SENNA CONCENTRATE AND DOCUSATE SODIUM 1 TABLET: 8.6; 5 TABLET, FILM COATED ORAL at 08:42

## 2017-01-01 RX ADMIN — PIPERACILLIN SODIUM,TAZOBACTAM SODIUM 4.5 G: 4; .5 INJECTION, POWDER, FOR SOLUTION INTRAVENOUS at 13:11

## 2017-01-01 RX ADMIN — AMOXICILLIN AND CLAVULANATE POTASSIUM 1 TABLET: 875; 125 TABLET, FILM COATED ORAL at 07:38

## 2017-01-01 RX ADMIN — SODIUM CHLORIDE, PRESERVATIVE FREE 500 UNITS: 5 INJECTION INTRAVENOUS at 10:40

## 2017-01-01 RX ADMIN — MIDAZOLAM HYDROCHLORIDE 1 MG: 1 INJECTION, SOLUTION INTRAMUSCULAR; INTRAVENOUS at 12:40

## 2017-01-01 RX ADMIN — HYDROMORPHONE HYDROCHLORIDE 0.5 MG: 1 INJECTION, SOLUTION INTRAMUSCULAR; INTRAVENOUS; SUBCUTANEOUS at 19:21

## 2017-01-01 RX ADMIN — Medication 10 ML: at 05:19

## 2017-01-01 RX ADMIN — FENTANYL CITRATE 25 MCG: 50 INJECTION, SOLUTION INTRAMUSCULAR; INTRAVENOUS at 16:16

## 2017-01-01 RX ADMIN — MUPIROCIN: 20 OINTMENT TOPICAL at 07:50

## 2017-01-01 RX ADMIN — SODIUM CHLORIDE, PRESERVATIVE FREE 300 UNITS: 5 INJECTION INTRAVENOUS at 12:46

## 2017-01-01 RX ADMIN — ALBUMIN (HUMAN) 25 G: 12.5 INJECTION, SOLUTION INTRAVENOUS at 21:41

## 2017-01-01 RX ADMIN — MUPIROCIN: 20 OINTMENT TOPICAL at 17:43

## 2017-01-01 RX ADMIN — SODIUM CHLORIDE: 900 INJECTION, SOLUTION INTRAVENOUS at 16:14

## 2017-01-01 RX ADMIN — FLUCONAZOLE 400 MG: 100 TABLET ORAL at 09:05

## 2017-01-01 RX ADMIN — LORAZEPAM 2 MG: 2 INJECTION INTRAMUSCULAR; INTRAVENOUS at 01:33

## 2017-01-01 RX ADMIN — GLYCOPYRROLATE 0.2 MG: 0.2 INJECTION INTRAMUSCULAR; INTRAVENOUS at 11:02

## 2017-01-01 RX ADMIN — HALOPERIDOL LACTATE 2 MG: 5 INJECTION, SOLUTION INTRAMUSCULAR at 16:34

## 2017-01-01 RX ADMIN — SOYBEAN OIL 250 ML: 20 INJECTION, SOLUTION INTRAVENOUS at 18:42

## 2017-01-01 RX ADMIN — DOXYCYCLINE HYCLATE 100 MG: 100 CAPSULE ORAL at 08:52

## 2017-01-01 RX ADMIN — PIPERACILLIN SODIUM,TAZOBACTAM SODIUM 3.38 G: 3; .375 INJECTION, POWDER, FOR SOLUTION INTRAVENOUS at 17:10

## 2017-01-01 RX ADMIN — IOPAMIDOL 200 ML: 612 INJECTION, SOLUTION INTRAVENOUS at 16:28

## 2017-01-01 RX ADMIN — Medication 10 ML: at 12:29

## 2017-01-01 RX ADMIN — SODIUM CHLORIDE: 9 INJECTION, SOLUTION INTRAVENOUS at 13:05

## 2017-01-01 RX ADMIN — Medication 10 ML: at 22:01

## 2017-01-01 RX ADMIN — ONDANSETRON 8 MG: 2 INJECTION INTRAMUSCULAR; INTRAVENOUS at 16:39

## 2017-01-01 RX ADMIN — AMOXICILLIN AND CLAVULANATE POTASSIUM 1 TABLET: 875; 125 TABLET, FILM COATED ORAL at 08:52

## 2017-01-01 RX ADMIN — DEXAMETHASONE SODIUM PHOSPHATE 10 MG: 10 INJECTION INTRAMUSCULAR; INTRAVENOUS at 12:42

## 2017-01-01 RX ADMIN — HYDROMORPHONE HYDROCHLORIDE 0.5 MG: 1 INJECTION, SOLUTION INTRAMUSCULAR; INTRAVENOUS; SUBCUTANEOUS at 06:15

## 2017-01-01 RX ADMIN — SODIUM CHLORIDE, PRESERVATIVE FREE 500 UNITS: 5 INJECTION INTRAVENOUS at 18:00

## 2017-01-01 RX ADMIN — HYDROCODONE BITARTRATE AND ACETAMINOPHEN 1 TABLET: 10; 325 TABLET ORAL at 22:29

## 2017-01-01 RX ADMIN — FLUCONAZOLE 400 MG: 100 TABLET ORAL at 08:51

## 2017-01-01 RX ADMIN — DOXYCYCLINE HYCLATE 100 MG: 100 CAPSULE ORAL at 07:37

## 2017-01-01 RX ADMIN — SODIUM CHLORIDE 40 MG: 9 INJECTION INTRAMUSCULAR; INTRAVENOUS; SUBCUTANEOUS at 19:53

## 2017-01-01 RX ADMIN — MIDAZOLAM HYDROCHLORIDE 0.5 MG: 1 INJECTION, SOLUTION INTRAMUSCULAR; INTRAVENOUS at 15:04

## 2017-01-01 RX ADMIN — SODIUM CHLORIDE: 234 INJECTION INTRAMUSCULAR; INTRAVENOUS; SUBCUTANEOUS at 18:00

## 2017-01-01 RX ADMIN — IOVERSOL 100 ML: 741 INJECTION INTRA-ARTERIAL; INTRAVENOUS at 11:46

## 2017-01-01 RX ADMIN — LORAZEPAM 1 MG: 2 INJECTION INTRAMUSCULAR; INTRAVENOUS at 03:38

## 2017-01-01 RX ADMIN — SODIUM CHLORIDE, SODIUM LACTATE, POTASSIUM CHLORIDE, CALCIUM CHLORIDE: 600; 310; 30; 20 INJECTION, SOLUTION INTRAVENOUS at 11:18

## 2017-01-01 RX ADMIN — HYDROCODONE BITARTRATE AND ACETAMINOPHEN 1 TABLET: 10; 325 TABLET ORAL at 18:58

## 2017-01-01 RX ADMIN — HYDROCODONE BITARTRATE AND ACETAMINOPHEN 1 TABLET: 10; 325 TABLET ORAL at 19:46

## 2017-01-01 RX ADMIN — HALOPERIDOL LACTATE 4 MG: 5 INJECTION, SOLUTION INTRAMUSCULAR at 13:21

## 2017-01-01 RX ADMIN — AMOXICILLIN AND CLAVULANATE POTASSIUM 1 TABLET: 875; 125 TABLET, FILM COATED ORAL at 18:50

## 2017-01-01 RX ADMIN — DEXTROSE MONOHYDRATE AND SODIUM CHLORIDE 100 ML/HR: 5; .45 INJECTION, SOLUTION INTRAVENOUS at 06:43

## 2017-01-01 RX ADMIN — HYDROMORPHONE HYDROCHLORIDE 1 MG: 1 INJECTION, SOLUTION INTRAMUSCULAR; INTRAVENOUS; SUBCUTANEOUS at 10:39

## 2017-01-02 NOTE — PROGRESS NOTES
Pt. Arrived to [facility] for: hydration  Any issues or concerns during this appointment: Patient with rash red raised noted on face, arms and trunk. Pt. States that he began taking Megace apx. 2 weeks ago and the rash began 5 days ago. [facility] at this time and rash will be re-evaluated on 1-5-17 at his appointment with Dr. Bindu Flynn. Patient aware of next appointment on: 1-5-17  @  1045  Pt.  Discharged: ambulatory home

## 2017-01-05 NOTE — PROGRESS NOTES
Arrived to the [facility]. Gemzar and abraxane completed. Patient tolerated well. Any issues or concerns during appointment: none. Patient aware of next infusion appointment on 1/20/17 11:15am.  Discharged ambulatory.

## 2017-01-20 NOTE — PROGRESS NOTES
Arrived to the [facility]. Mariam completed  Any issues or concerns during appointment: No  Patient aware of next infusion appointment on Saturday,January 21st @ 0900-patient to have 1 unit of PRBC's transfused  Discharged home

## 2017-01-21 NOTE — PROGRESS NOTES
Arrived to the [facility]. 1 unit PRBC's completed. Patient tolerated well. Any issues or concerns during appointment: none. Patient aware of next infusion appointment on 2/3/17 at 1115. Discharged ambulatory.

## 2017-02-02 NOTE — PROGRESS NOTES
Mr. Taylor Juares has been approved to receive Keytruda from the , [facility], at no cost.  Prescription for Lisseth Reading has been sent to [facility]. Shipment will be set up today for delivery tomorrow. I spoke with  Taylor Juares and gave him the good news.

## 2017-02-03 PROBLEM — C25.0 MALIGNANT NEOPLASM OF HEAD OF PANCREAS (HCC): Status: ACTIVE | Noted: 2017-01-01

## 2017-02-03 NOTE — ACP (ADVANCE CARE PLANNING)
I saw pt today with Dr Vimal Eaton. He has been having chill and WBC is 18,000 with increased CA-19-9. Dr Vimal Eaton contacted Dr Carloz Egan and scheduled biliary stent today for 2pm. We will reschedule Mabel Winchester for 2-15-17. Pt has been given chemo care information and Dr Vimal Eaton reviewed possible side effects.

## 2017-02-03 NOTE — H&P
H&P  Date of Service: 2/3/17 Brittany Dillon MD   Gastroenterology      []Hide copied text             Patient:  Jose Whiting  YOB: 1935   Date: This [de-identified]year old patient was referred by Demetri Westfall MD.     Chief Complaint:  Patient seen at the request of Demetri Westfall MD for evaluation of the following:  Biliary stricture     History of Present Illness:  1. Biliary stricture   Possible cholangitis    PAST MEDICAL/SURGICAL HISTORY (Detailed)     Disease/disorder Onset Date Management Date Comments       Appendectomy 1995         Cholecystectomy 2012     biliary stricture 09/04/2015 ercp- double pigtail       biliary stricture 06/19/2015 ercp- stent change       biliary stricture 03/25/2015 ercp- stent change       biliary stricture 12/08/2014 stent change   CY 12/09/2014 -dual 10/12 stents   Cancer, prostate       in 1999   [place]     [facility]   Hepatoduodenal lymph node 2013 [place]   [facility] [place]     [facility] [place]   Hypertension           Obstructive jaundice 07/2014 ERCP c Stent placed   [facility] [place] -   Papillotomy c ballon sweep 2012 [place]   [facility] [place]   Stent change 09/2014 [place]   [facility] [place]      Multiple stent changes since 2015- last 11/28/16        PROVIDER INFORMATION AND PATIENT ADMISSION:     CURRENT MEDICATIONS  Medication Name Sig Desc   atenolol 50 mg Tab take 1 tablet (50MG) by oral route every day   Claritin 10 mg tablet TAKE 1 TABLET AS NEEDED. Creon TAKE 3 CAPSULES DAILY.    Benicar 20 mg tablet take 1 tablet by oral route every day   Probiotic 10 billion cell capsule take one by mouth daily   PANTOPRAZOLE SODIUM take 1 tablet by oral route every day   magnesium takes 1 po qd   omeprazole take 1 capsule by oral route every day before a meal   Medication Reconciliation  Medications reconciled today.     Allergies:  Ingredient Reaction Medication Name Comment   IODINE         IODINE     IODINE-FAINT   MORPHINE Hives/Skin Rash       Reviewed, no changes.         Family History  (Detailed)     SOCIAL HISTORY  (Detailed)  Tobacco use reviewed. Preferred language is [place]. MARITAL STATUS/FAMILY/SOCIAL SUPPORT  Currently . Smoking status: Former smoker.     SMOKING STATUS  Use Status Type Smoking Status Usage Per Day Years Used Total Pack Years   no/never   Never smoker         yes   Former smoker         yes   Former smoker         yes   Former smoker         yes   Former smoker                     REVIEW [address] Neg/Pos Details   ENMT Negative Hearing deficit. Eyes Negative Vision changes. Respiratory Negative Dyspnea. Cardio Negative Chest pain and irregular heartbeat/palpitations.  Negative Dysuria and urinary frequency. Endocrine Negative Cold intolerance and heat intolerance. Neuro Negative Confusion/disorientation, dizziness, headache and seizures. Hema/Lymph Negative Easy bleeding.      VITAL SIGNS  BP mm/Hg Pulse/min Resp/min Temp F Height (Total in.) Weight (lbs.) Weight (oz.) BMI   140/80 68 16   73.00 165.00   21.77      PHYSICAL EXAM:  Alert and oriented  Lungs clear  Heart normal  No edema           Medications Prescribed (this encounter)  Medication Directions   Levaquin 500 mg tablet take 1 tablet by oral route every 24 hours x 7 days      Assessment/Plan  # Detail Type Description   1. Assessment Malignant neoplasm of other parts of pancreas (C25.7).         2. Assessment Obstruction of bile duct (K83.1).                          possible cholangitis    ERCP today          VAMSI. Mel Mcintyre MD        E                          Pt had massive bleed during procedure  Resuscitated and stable  Options discussed with pt and family  Will admit and rediscuss in AM     [address]   Maik Estrada MD

## 2017-02-03 NOTE — IP AVS SNAPSHOT
Current Discharge Medication List  
  
Take these medications at their scheduled times Dose & Instructions Dispensing Information Comments Morning Noon Evening Bedtime  
 amoxicillin-clavulanate 875-125 mg per tablet Commonly known as:  AUGMENTIN Your next dose is: Today, Tomorrow Other:  ____________ Dose:  1 Tab Take 1 Tab by mouth every twelve (12) hours for 27 days. Quantity:  54 Tab Refills:  0  
     
   
   
   
  
 amylase-lipase-protease 24,000-76,000 -120,000 unit capsule Commonly known as:  CREON Your next dose is: Today, Tomorrow Other:  ____________ Dose:  1 Cap Take 1 Cap by mouth three (3) times daily (with meals). Quantity:  270 Cap Refills:  3  
     
   
   
   
  
 atenolol 50 mg tablet Commonly known as:  TENORMIN Your next dose is: Today, Tomorrow Other:  ____________ Dose:  50 mg Take 1 Tab by mouth every morning. Quantity:  90 Tab Refills:  3  
     
   
   
   
  
 doxycycline 100 mg capsule Commonly known as:  VIBRAMYCIN Your next dose is: Today, Tomorrow Other:  ____________ Dose:  100 mg Take 1 Cap by mouth every twelve (12) hours for 27 days. Quantity:  54 Cap Refills:  0  
     
   
   
   
  
 fluconazole 200 mg tablet Commonly known as:  DIFLUCAN Your next dose is: Today, Tomorrow Other:  ____________ Dose:  400 mg Take 2 Tabs by mouth daily for 27 days. FDA advises cautious prescribing of oral fluconazole in pregnancy. Quantity:  54 Tab Refills:  0 MIRALAX 17 gram/dose powder Generic drug:  polyethylene glycol Your next dose is: Today, Tomorrow Other:  ____________ Dose:  17 g Take 17 g by mouth daily. Refills:  0 Omeprazole delayed release 20 mg tablet Commonly known as:  PRILOSEC D/R  
   
 Your next dose is: Today, Tomorrow Other:  ____________ Dose:  20 mg Take 20 mg by mouth daily. Refills:  0 PROBIOTIC 4X 10-15 mg Tbec Generic drug:  B.infantis-B.ani-B.long-B.bifi Your next dose is: Today, Tomorrow Other:  ____________ Dose:  1 Tab Take 1 Tab by mouth every morning. Refills:  0 Take these medications as needed Dose & Instructions Dispensing Information Comments Morning Noon Evening Bedtime * HYDROcodone-acetaminophen  mg tablet Commonly known as:  Johanne Punt Your next dose is: Today, Tomorrow Other:  ____________ Dose:  1 Tab Take 1 Tab by mouth every six (6) hours as needed for Pain. Max Daily Amount: 4 Tabs. Quantity:  120 Tab Refills:  0  
     
   
   
   
  
 * HYDROcodone-acetaminophen  mg tablet Commonly known as:  Johanne Punt Your next dose is: Today, Tomorrow Other:  ____________ Dose:  1 Tab Take 1 Tab by mouth every six (6) hours as needed for Pain. Max Daily Amount: 4 Tabs. Quantity:  60 Tab Refills:  0  
     
   
   
   
  
 senna-docusate 8.6-50 mg per tablet Commonly known as:  Delisa Ange Your next dose is: Today, Tomorrow Other:  ____________ Dose:  1 Tab Take 1 Tab by mouth as needed. Refills:  0  
     
   
   
   
  
 zolpidem 5 mg tablet Commonly known as:  AMBIEN Your next dose is: Today, Tomorrow Other:  ____________ Dose:  5 mg Take 1 Tab by mouth nightly as needed for Sleep. Max Daily Amount: 5 mg. Quantity:  30 Tab Refills:  2  
     
   
   
   
  
 * Notice: This list has 2 medication(s) that are the same as other medications prescribed for you. Read the directions carefully, and ask your doctor or other care provider to review them with you. Take these medications as directed Dose & Instructions Dispensing Information Comments Morning Noon Evening Bedtime  
 dexamethasone 1 mg tablet Commonly known as:  DECADRON Your next dose is: Today, Tomorrow Other:  ____________ Refills:  0  
     
   
   
   
  
 lubiPROStone 8 mcg capsule Commonly known as:  Yehuda Dimitrios Your next dose is: Today, Tomorrow Other:  ____________ Strength: 8 mcg; Form: ; SIG: take 1 tab by mouth 2 times a day for 30 days Quantity:  60 Cap Refills:  5  
     
   
   
   
  
 magnesium oxide 400 mg tablet Commonly known as:  MAG-OX Your next dose is: Today, Tomorrow Other:  ____________ Dose:  400 mg Take 400 mg by mouth. Refills:  0 ASK your doctor about these medications Dose & Instructions Dispensing Information Comments Morning Noon Evening Bedtime  
 ciprofloxacin HCl 500 mg tablet Commonly known as:  CIPRO Ask about: Should I take this medication? Your next dose is: Today, Tomorrow Other:  ____________ Dose:  500 mg Take 1 Tab by mouth two (2) times a day for 30 days. Quantity:  60 Tab Refills:  3  
     
   
   
   
  
 pembrolizumab 100 mg/4 mL (25 mg/mL) injection Commonly known as:  DJCQKWFP Your next dose is: Today, Tomorrow Other:  ____________ Dose:  10 mg/kg 28.84 mL by IntraVENous route every twenty-one (21) days. Quantity:  8 Vial  
Refills:  10 Where to Get Your Medications These medications were sent to [facility] [address], [address] [address] Phone:  [phone]  
  amoxicillin-clavulanate 875-125 mg per tablet  
 doxycycline 100 mg capsule  
 fluconazole 200 mg tablet Information about where to get these medications is not yet available ! Ask your nurse or doctor about these medications HYDROcodone-acetaminophen  mg tablet

## 2017-02-03 NOTE — PROGRESS NOTES
Patient out from operating room and placed on the ventilator on documented settings. Patient is orally intubated with a # 8.0 ET Tube secured at the 23 cm aleksandra at the lip. Breath sounds are diminished. Trachea is midline. Negative for subcutaneous air, chest excursion is symmetric. Negative for pitting edema. Patient is also Negative for cyanosis. All alarms are set and audible. Resuscitation bag is at the head of the bed. Ventilator Settings  Mode FIO2 Rate Tidal Volume Pressure PEEP I:E Ratio   Pressure support  30 %       5 cm H2O  8 cm H20         Peak airway pressure: 14 cm H2O   Minute ventilation: 8.6 l/min     ABG: No results for input(s): PH, PCO2, PO2, HCO3 in the last 72 hours.       Angelito Gomez, RT

## 2017-02-03 NOTE — PROCEDURES
ENDOSCOPIC  RETROGRADE CHOLANGIOPANCREATOGRAPHY    DATE of PROCEDURE: 2/3/2017    PT NAME: Ar Worley     [id number]    MEDICATION: general;     INSTRUMENT:  [id number] VF    SPECIAL PROCEDURE:stent removal  BLOOD LOSS- massive  SPEC- no  IMPLANT- none    PROCEDURE: standard procedure for stent removal then pt had a massive arterial bleed from the bed of malignant invasion into the duodenal bulb- injected with epi and eventually clotted    ASSESSMENT:  1. Invasive pancreatic cancer with duodenal bleed  2. Stent removed but unable to replace      PLAN:  1. Admit  2.  Transfuse     Channing Hobbs MD

## 2017-02-03 NOTE — CONSULTS
Department of Interventional Radiology  [phone]        Consult Note     Patient: Vivienne Negrete MRN: [id number]  SSN: [id number]    YOB: 1935  Age: 80 y.o. Sex: male      Referring Physician: Dr Fartun Martin Date: 2/3/2017     Subjective:     Chief Complaint: Acute hemorrhage from the invasive pancreatic cancer into the biliary tree/duodenum. History of Present Illness: Vivienne Negrete is a 80 y.o. male who is seen in consultation for the above. Patient with presumed cholangitis underwent ERCP today. Dr Brian Holt described extensive hemorrhage during stent retrieval today. Replacement stent could not be inserted and urgent consultation was obtained. I have reviewed recent CT scans. Pertinent findings include: Extensive calcified atherosclerotic disease, Celiac Artery origin significant stenosis, Hepatic Artery stenosis, Superior Mesenteric significant stenosis, Right Common Femoral Artery 99% stenosis, Left CFA moderate stenosis: Pancreatic head mass; plastic Biliary stent, posterior right intrahepatic biliary dilation. While the patient remained intubated/sedated in the PACU, Dr. Brian Holt and I spoke to his wife and daughter. I explained the risks of arteriography/embolization (LE ischemia, nontarget embolization), the technical difficulty due to the extensive atherosclerotic disease, and that there is no guarantee of success or even slowing the bleeding. When asked their desire for what degree of aggressiveness to pursue for Mr Lupe Felton, they were unable to make a statement. When asked what Mr Lupe Felton would want, they did not feel that they could speak for him. Unfortunately, they have made no concrete decisions concerning end-of-life events, and are unwilling to make those decisions now.   It is clear from their questions that they are both in a significant degree of denial--when I repeated Dr Go Axon statement that Mr Lupe Felton has unresectable pancreatic cancer, the daughter's response was one of surprise, not resignation. At this point, they were comfortable with waking Mr Jamee Ham, explaining his options to him, and letting him decide. I evaluated Mr Jamee Ham in the PACU--he remains intubated as of a short time ago. I will sign out this difficult case to my on-call partner, Dr Negrita Dubois who has had many interactions w the Jamee Ham family.   Hopefully, they will feel comfortable with him, as they obviously do w Dr Jazlyn Man, and will be able to make end-of-life decisions concerning resuscitation status, types of procedures wanted/not wanted, etc.      Ashley Reyna MD        Past Medical History   Diagnosis Date    Anemia      last blood transfusion 5-2016    GERD (gastroesophageal reflux disease)      controlled with med    Hypertension      managed with meds    Nausea & vomiting      some N/V, pt does well with antiemetic    Pancreatic cancer ([address]      oral chemo    Portacath in place      chest port in place for chemo    Prostate cancer ([address]      resulted in prostatectomy     Past Surgical History   Procedure Laterality Date    Hx prostatectomy      Hx appendectomy      Hx lap cholecystectomy      Hx hernia repair Right      R  hernia repair with mesh     Hx endoscopy       multiple ERCP with stent     Hx vascular access       chest port    Hx ercp  06/23/2016     stent change    Hx ercp  11/28/2016     5cm- 10 fr double pigtail    Hx cholecystectomy        Social History   Substance Use Topics    Smoking status: Former Smoker     Years: 4.00     Quit date: 1/1/1959    Smokeless tobacco: Never Used    Alcohol use 1.2 - 1.8 oz/week     2 - 3 Shots of liquor per week      Allergies   Allergen Reactions    Demerol [Meperidine] Nausea and Vomiting    Megace [Megestrol] Rash    Morphine Other (comments)     Severe sweating     Tape [Adhesive] Other (comments)     Paper tape ok  - redness on skin        Objective:     Physical Exam:  Visit Vitals    BP 90/57    Pulse 77    Temp 97.3 °F (36.3 °C)    Resp 12    Ht 6' 1\" (1.854 m)    Wt 71.7 kg (158 lb)    SpO2 100%    BMI 20.85 kg/m2      GENERAL:  Intubated, sedated.

## 2017-02-03 NOTE — IP AVS SNAPSHOT
[address] [address] [address] [facility] 
[phone] Patient: Zeus Zelaya MRN: [id number] DUO:3/93/4481 You are allergic to the following Allergen Reactions Demerol (Meperidine) Nausea and Vomiting Megace (Megestrol) Rash Morphine Other (comments) Severe sweating Tape (Adhesive) Other (comments) Paper tape ok  - redness on skin Immunizations Administered for This Admission Name Date Influenza Vaccine (Quad) PF 2/11/2017 Recent Documentation Height Weight BMI Smoking Status 1.854 m 72.8 kg 21.19 kg/m2 Former Smoker Unresulted Labs Order Current Status FUNGUS CULTURE AND SMEAR In process CULTURE, BODY FLUID W GRAM STAIN Preliminary result Emergency Contacts Name Discharge Info Relation Home Work Mobile Sophia Deluca DISCHARGE CAREGIVER [3] Spouse [3] [phone] Lori Soni  Daughter [21] [phone] About your hospitalization You were admitted on:  February 3, 2017 You last received care in the:  [address] You were discharged on:  February 11, 2017 Unit phone number:  [phone] Why you were hospitalized Your primary diagnosis was:  Gastrointestinal Hemorrhage With Hematemesis Your diagnoses also included:  Gi Bleed, Hepatic Abscess Providers Seen During Your Hospitalizations Provider Role Specialty Primary office phone Prudence Swanson MD Attending Provider Gastroenterology [phone] Aaron Baird NP Attending Provider Nurse Practitioner [phone] Isra Jones MD Attending Provider Internal Medicine [phone] Your Primary Care Physician (PCP) Primary Care Physician Office Phone Office Fax Delonte Bragg [phone] Follow-up Information Follow up With Details Comments Contact Info Winter Willoughby MD On 3/3/2017 Appointment 11:00 am per [address]. [address] [place] [address] 
[phone] Angelito Back MD   [address] 
[address] [facility] [address] 
[phone] Your Appointments Sunday February 12, 2017  9:00 AM EST [facility] ([address] [place] [address]) 5th Floor Infusion [address] Saavedra Jihan [phone]  [facility] [address]  
[phone] For [facility] SURGICAL hospitals Floor [phone] ext. [phone] [facility] Wednesday February 15, 2017  2:00 PM EST  
LAB with [address]  
1808 [facility] OUTREACH INSURANCE [facility]) Daniel Lackeyon [address] [facility]  
[phone] Wednesday February 15, 2017  2:30 PM EST PreChemo Follow Up with Amber Childs MD  
[facility] Hematology and Oncology [facility] C/ Donny M[address] [facility] [address]  
[phone] Wednesday February 15, 2017  3:00 PM EST Chemo with [facility]) [address] Saavedra Jihan [phone] [facility] [address]  
[phone] [address] Current Discharge Medication List  
  
START taking these medications Dose & Instructions Dispensing Information Comments Morning Noon Evening Bedtime  
 amoxicillin-clavulanate 875-125 mg per tablet Commonly known as:  AUGMENTIN Your next dose is: Today, Tomorrow Other:  _________ Dose:  1 Tab Take 1 Tab by mouth every twelve (12) hours for 27 days. Quantity:  54 Tab Refills:  0  
     
   
   
   
  
 doxycycline 100 mg capsule Commonly known as:  VIBRAMYCIN Your next dose is: Today, Tomorrow Other:  _________ Dose:  100 mg Take 1 Cap by mouth every twelve (12) hours for 27 days. Quantity:  54 Cap Refills:  0 fluconazole 200 mg tablet Commonly known as:  DIFLUCAN Your next dose is: Today, Tomorrow Other:  _________ Dose:  400 mg Take 2 Tabs by mouth daily for 27 days. FDA advises cautious prescribing of oral fluconazole in pregnancy. Quantity:  54 Tab Refills:  0 CONTINUE these medications which have CHANGED Dose & Instructions Dispensing Information Comments Morning Noon Evening Bedtime * HYDROcodone-acetaminophen  mg tablet Commonly known as:  Malathi Alcantara What changed:  Another medication with the same name was added. Make sure you understand how and when to take each. Your next dose is: Today, Tomorrow Other:  _________ Dose:  1 Tab Take 1 Tab by mouth every six (6) hours as needed for Pain. Max Daily Amount: 4 Tabs. Quantity:  120 Tab Refills:  0  
     
   
   
   
  
 * HYDROcodone-acetaminophen  mg tablet Commonly known as:  Malathi Alcantara What changed: You were already taking a medication with the same name, and this prescription was added. Make sure you understand how and when to take each. Your next dose is: Today, Tomorrow Other:  _________ Dose:  1 Tab Take 1 Tab by mouth every six (6) hours as needed for Pain. Max Daily Amount: 4 Tabs. Quantity:  60 Tab Refills:  0  
     
   
   
   
  
 * Notice: This list has 2 medication(s) that are the same as other medications prescribed for you. Read the directions carefully, and ask your doctor or other care provider to review them with you. CONTINUE these medications which have NOT CHANGED Dose & Instructions Dispensing Information Comments Morning Noon Evening Bedtime  
 amylase-lipase-protease 24,000-76,000 -120,000 unit capsule Commonly known as:  CREON Your next dose is: Today, Tomorrow Other:  _________ Dose:  1 Cap Take 1 Cap by mouth three (3) times daily (with meals). Quantity:  270 Cap Refills:  3  
     
   
   
   
  
 atenolol 50 mg tablet Commonly known as:  TENORMIN Your next dose is: Today, Tomorrow Other:  _________ Dose:  50 mg Take 1 Tab by mouth every morning. Quantity:  90 Tab Refills:  3  
     
   
   
   
  
 dexamethasone 1 mg tablet Commonly known as:  DECADRON Your next dose is: Today, Tomorrow Other:  _________ Refills:  0  
     
   
   
   
  
 lubiPROStone 8 mcg capsule Commonly known as:  Winda Roes Your next dose is: Today, Tomorrow Other:  _________ Strength: 8 mcg; Form: ; SIG: take 1 tab by mouth 2 times a day for 30 days Quantity:  60 Cap Refills:  5  
     
   
   
   
  
 magnesium oxide 400 mg tablet Commonly known as:  MAG-OX Your next dose is: Today, Tomorrow Other:  _________ Dose:  400 mg Take 400 mg by mouth. Refills:  0 MIRALAX 17 gram/dose powder Generic drug:  polyethylene glycol Your next dose is: Today, Tomorrow Other:  _________ Dose:  17 g Take 17 g by mouth daily. Refills:  0 Omeprazole delayed release 20 mg tablet Commonly known as:  PRILOSEC D/R Your next dose is: Today, Tomorrow Other:  _________ Dose:  20 mg Take 20 mg by mouth daily. Refills:  0 PROBIOTIC 4X 10-15 mg Tbec Generic drug:  B.infantis-B.ani-B.long-B.bifi Your next dose is: Today, Tomorrow Other:  _________ Dose:  1 Tab Take 1 Tab by mouth every morning. Refills:  0  
     
   
   
   
  
 senna-docusate 8.6-50 mg per tablet Commonly known as:  Amnagodfrey Allisons Your next dose is: Today, Tomorrow Other:  _________ Dose:  1 Tab Take 1 Tab by mouth as needed. Refills:  0 zolpidem 5 mg tablet Commonly known as:  AMBIEN Your next dose is: Today, Tomorrow Other:  _________ Dose:  5 mg Take 1 Tab by mouth nightly as needed for Sleep. Max Daily Amount: 5 mg. Quantity:  30 Tab Refills:  2 ASK your doctor about these medications Dose & Instructions Dispensing Information Comments Morning Noon Evening Bedtime  
 ciprofloxacin HCl 500 mg tablet Commonly known as:  CIPRO Ask about: Should I take this medication? Your next dose is: Today, Tomorrow Other:  _________ Dose:  500 mg Take 1 Tab by mouth two (2) times a day for 30 days. Quantity:  60 Tab Refills:  3  
     
   
   
   
  
 pembrolizumab 100 mg/4 mL (25 mg/mL) injection Commonly known as:  KDFAPURQ Your next dose is: Today, Tomorrow Other:  _________ Dose:  10 mg/kg 28.84 mL by IntraVENous route every twenty-one (21) days. Quantity:  8 Vial  
Refills:  10 Where to Get Your Medications These medications were sent to [address] [address], [address] [place] [address] Phone:  [phone]  
  amoxicillin-clavulanate 875-125 mg per tablet  
 doxycycline 100 mg capsule  
 fluconazole 200 mg tablet Information on where to get these meds will be given to you by the nurse or doctor. ! Ask your nurse or doctor about these medications HYDROcodone-acetaminophen  mg tablet Discharge Instructions DISCHARGE SUMMARY from Nurse The following personal items are in your possession at time of discharge: 
 
Dental Appliances: None Visual Aid: At bedside Home Medications: None Jewelry: Watch Clothing: Pajamas Other Valuables: Cell Phone PATIENT INSTRUCTIONS: 
 
 
Recognize signs and symptoms of STROKE: 
 
 F-face looks uneven A-arms unable to move or move unevenly S-speech slurred or non-existent T-time-call 911 as soon as signs and symptoms begin-DO NOT go Back to bed or wait to see if you get better-TIME IS BRAIN. Warning Signs of HEART ATTACK Call 911 if you have these symptoms: 
? Chest discomfort. Most heart attacks involve discomfort in the center of the chest that lasts more than a few minutes, or that goes away and comes back. It can feel like uncomfortable pressure, squeezing, fullness, or pain. ? Discomfort in other areas of the upper body. Symptoms can include pain or discomfort in one or both arms, the back, neck, jaw, or stomach. ? Shortness of breath with or without chest discomfort. ? Other signs may include breaking out in a cold sweat, nausea, or lightheadedness. Don't wait more than five minutes to call [address]! Fast action can save your life. Calling 911 is almost always the fastest way to get lifesaving treatment. Emergency Medical Services staff can begin treatment when they arrive  up to an hour sooner than if someone gets to the hospital by car. The discharge information has been reviewed with the patient. The patient verbalized understanding. Discharge medications reviewed with the patient and appropriate educational materials and side effects teaching were provided. Discharge Orders None ACO Transitions of Care Introducing [employer] [address] Karen Sy offers a voluntary care coordination program to provide high quality service and care to [facility] fee-for-service beneficiaries. Radha Santos was designed to help you enhance your health and well-being through the following services: ? Transitions of Care  support for individuals who are transitioning from one care setting to another (example: Hospital to home). ? Chronic and Complex Care Coordination  support for individuals and caregivers of those with serious or chronic illnesses or with more than one chronic (ongoing) condition and those who take a number of different medications. If you meet specific medical criteria, a [address] [place] [address] Rd may call you directly to coordinate your care with your primary care physician and your other care providers. For questions about the [facility] programs, please, contact your physicians office. For general questions or additional information about Accountable Care Organizations: 
Please visit www.medicare.gov/acos. html or call 1-800-MEDICARE (6-131.364.3076) TTY users should call 9-129.709.9663. [employer] Announcement We are excited to announce that we are making your provider's discharge notes available to you in [employer]. You will see these notes when they are completed and signed by the physician that discharged you from your recent hospital stay. If you have any questions or concerns about any information you see in [employer], please call the Health Information Department where you were seen or reach out to your Primary Care Provider for more information about your plan of care. Introducing [facility] & HEALTH SERVICES! Dear Manuel Pablo: Thank you for requesting a [employer] account. Our records indicate that you have previously registered for a [employer] account but its currently inactive. Please call our [employer] support line at [phone]. Additional Information If you have questions, please visit the Frequently Asked Questions section of the [employer] website at https://[employer]. [employer]. "[employer]/. Remember, [employer] is NOT to be used for urgent needs. For medical emergencies, dial 911. Now available from your iPhone and Android! General Information Please provide this summary of care documentation to your next provider.  
  
  
    
    
 Patient Signature: ____________________________________________________________ Date:  ____________________________________________________________  
  
Scharlene Alosa Provider Signature:  ____________________________________________________________ Date:  ____________________________________________________________

## 2017-02-03 NOTE — ANESTHESIA PREPROCEDURE EVALUATION
Anesthetic History     PONV          Review of Systems / Medical History  Patient summary reviewed and pertinent labs reviewed    Pulmonary                   Neuro/Psych              Cardiovascular    Hypertension: well controlled        Dysrhythmias : PVC      Exercise tolerance: >4 METS     GI/Hepatic/Renal     GERD: well controlled           Endo/Other        Cancer and anemia     Other Findings   Comments: Pancreatic cancer 5 years; chemo         Physical Exam    Airway  Mallampati: II  TM Distance: > 6 cm    Mouth opening: Normal     Cardiovascular    Rhythm: regular  Rate: normal    Murmur: Grade 1, Aortic area     Dental    Dentition: Caps/crowns     Pulmonary  Breath sounds clear to auscultation               Abdominal         Other Findings            Anesthetic Plan    ASA: 3  Anesthesia type: general            Anesthetic plan and risks discussed with: Patient

## 2017-02-04 PROBLEM — K92.2 GI BLEED: Status: ACTIVE | Noted: 2017-01-01

## 2017-02-04 PROBLEM — K92.0 GASTROINTESTINAL HEMORRHAGE WITH HEMATEMESIS: Status: ACTIVE | Noted: 2017-01-01

## 2017-02-04 NOTE — PROGRESS NOTES
TRANSFER - IN REPORT:    Verbal report received from [place] on German Stephen  being received from [facility] for routine progression of care      Report consisted of patients Situation, Background, Assessment and   Recommendations(SBAR). Information from the following report(s) SBAR was reviewed with the receiving nurse. Opportunity for questions and clarification was provided. Assessment completed upon patients arrival to unit and care assumed.

## 2017-02-04 NOTE — PROGRESS NOTES
Orders received from [facility] to transfuse 2 units of blood due to current result of 6.6Hgb. Requested to change morning BMP to CMP. Read back and confirmed.

## 2017-02-04 NOTE — ANESTHESIA POSTPROCEDURE EVALUATION
Post-Anesthesia Evaluation and Assessment    Patient: Devang Yeh MRN: [id number]  SSN: [id number]    YOB: 1935  Age: 80 y.o. Sex: male       Cardiovascular Function/Vital Signs  Visit Vitals    /67    Pulse (!) 51    Temp 36.3 °C (97.3 °F)    Resp 12    Ht 6' 1\" (1.854 m)    Wt 71.7 kg (158 lb)    SpO2 100%    BMI 20.85 kg/m2       Patient is status post general anesthesia for Procedure(s):  ENDOSCOPIC RETROGRADE CHOLANGIOPANCREATOGRAPHY  ENDOSCOPY WITH PROSTHESIS OR STENT REMOVAL. Nausea/Vomiting: None    Postoperative hydration reviewed and adequate. Pain:  Pain Scale 1: Visual (02/03/17 1820)  Pain Intensity 1: 0 (02/03/17 1820)   Managed    Neurological Status:   Neuro (WDL): Exceptions to WDL (02/03/17 1728)  Neuro  Neurologic State: Pharmacologically induced (comment) (Propofol) (02/03/17 1728)   At baseline    Mental Status and Level of Consciousness: Arousable    Pulmonary Status:   O2 Device: Room air (02/03/17 1850)   Adequate oxygenation and airway patent    Complications related to anesthesia: None    Post-anesthesia assessment completed. Patient was extubated in PACU after following commands, sustained tetany on NMT, suctioning.       Signed By: Yeny Crawford MD     February 3, 2017

## 2017-02-04 NOTE — PROGRESS NOTES
2/4/2017    Admit Date: 2/3/2017    Subjective:   CHIEF COMPLAINT- gi bleed  HPI      Overall-stable           Diet-clear    Appetite-good     Nausea-no   Vomiting-no          Pain-no            BM-2   Bleeding-none further    Medications-reviewed and adjusted as appropriate   IV FLUIDS-reviewed      FAM HX-per H&P   SH-per H&P            etoh-yes      Past Medical History   Diagnosis Date    Anemia      last blood transfusion 5-2016    GERD (gastroesophageal reflux disease)      controlled with med    Hypertension      managed with meds    Nausea & vomiting      some N/V, pt does well with antiemetic    Pancreatic cancer ([address]      oral chemo    Portacath in place      chest port in place for chemo    Prostate cancer ([address]      resulted in prostatectomy               Past Surgical History   Procedure Laterality Date    Hx prostatectomy      Hx appendectomy      Hx lap cholecystectomy      Hx hernia repair Right      R  hernia repair with mesh     Hx endoscopy       multiple ERCP with stent     Hx vascular access       chest port    Hx ercp  06/23/2016     stent change    Hx ercp  11/28/2016     5cm- 10 fr double pigtail    Hx cholecystectomy         ROS--                 RESP-neg            CARDIAC-neg                       -neg             Further ROS as per PMH and PSH- see problem list                            Objective:     Blood pressure 106/84, pulse (!) 56, temperature 97.8 °F (36.6 °C), resp. rate 12, height 6' 1\" (1.854 m), weight 71.7 kg (158 lb), SpO2 97 %.       Intake/Output Summary (Last 24 hours) at 02/04/17 0757  Last data filed at 02/1935   Gross per 24 hour   Intake             2600 ml   Output                0 ml   Net             2600 ml       EXAM:     NEURO-a&o    HEENT-wnl    LUNGS-clear       COR-rrr        ABD-soft , min tenderness, no rebound, good bs        EXT-no edema                         LABS-  Lab Results   Component Value Date/Time    WBC 16.4 02/04/2017 04:00 AM    RBC 2.62 02/04/2017 04:00 AM    HGB 8.1 02/04/2017 04:00 AM    HCT 24.1 02/04/2017 04:00 AM    PLATELET 288 14/92/5965 04:00 AM     Lab Results   Component Value Date/Time    Sodium 142 02/04/2017 04:00 AM    Potassium 3.9 02/04/2017 04:00 AM    Chloride 112 02/04/2017 04:00 AM    CO2 21 02/04/2017 04:00 AM    Anion gap 9 02/04/2017 04:00 AM    Glucose 127 02/04/2017 04:00 AM    BUN 31 02/04/2017 04:00 AM    Creatinine 0.71 02/04/2017 04:00 AM    GFR est AA >60 02/04/2017 04:00 AM    GFR est non-AA >60 02/04/2017 04:00 AM    Calcium 6.4 02/04/2017 04:00 AM    Magnesium 2.2 02/03/2017 10:10 AM    Phosphorus 3.1 07/25/2012 03:10 PM    Albumin 1.6 02/04/2017 04:00 AM    Bilirubin, total 0.7 02/04/2017 04:00 AM    Protein, total 4.5 02/04/2017 04:00 AM    Globulin 2.9 02/04/2017 04:00 AM    A-G Ratio 0.6 02/04/2017 04:00 AM    AST (SGOT) 35 02/04/2017 04:00 AM    ALT (SGPT) 21 02/04/2017 04:00 AM            TRANSFUSION- 3 units yesterday    Assessment:     Active Problems:    Gastrointestinal hemorrhage with hematemesis (2/4/2017)      Hospital Problems  Date Reviewed: 2/3/2017          Codes Class Noted POA    Gastrointestinal hemorrhage with hematemesis ICD-10-CM: K92.0  ICD-9-CM: 578.0  2/4/2017 Yes          pt has stabilized overnite   hgb up post transfusion for major GI bleed  Question whether wbc was due to stent,  recurrent abscess, or underlying malignancy- down after dose of zosyn yesterday  Difficult decisions regarding stent and prognosis and risk of future bleed  Pt and wife have not really faced end of life issues as he has been functional and tried to protect her   They did express no ventilator    We discussed repeat ERCP for stent and evaluation of bleeding site  IR options were discussed but would not affect overall disease process  i am concerned that only the daughter really grasps the gravity of his disease because he has done so well since his dx in 2012  i expect it is hitting home with them now that we are reaching some untreatable problems            Plan:     CT of abd today- eval for abscess   Zosyn  Discuss with pt and wife again  Contact Dr. Martin Crews  consider repeat  Endoscopy tomorrow to evaluate site and possible stent replacement  Prepare pt and family for risks and possible life threatening events    PT SEEN AND EXAMINED AND PLAN DISCUSSED AND IMPLEMENTED.   Cristobal Dela Cruz MD

## 2017-02-04 NOTE — ROUTINE PROCESS
TRANSFER - OUT REPORT:    Verbal report given to Efrain Willett on Levi Conte  being transferred to [phone] for routine post - op       Report consisted of patients Situation, Background, Assessment and   Recommendations(SBAR). Information from the following report(s) SBAR, Kardex, OR Summary, Procedure Summary, Intake/Output and MAR was reviewed with the receiving nurse. Lines:   Venous Access Device Upper chest (subclavicular area), left (Active)       Peripheral IV 02/03/17 Left Arm (Active)   Site Assessment Clean, dry, & intact 2/3/2017  5:28 PM   Phlebitis Assessment 0 2/3/2017  5:28 PM   Infiltration Assessment 0 2/3/2017  5:28 PM   Dressing Status Clean, dry, & intact 2/3/2017  5:28 PM   Dressing Type Transparent;Tape 2/3/2017  5:28 PM   Hub Color/Line Status Infusing 2/3/2017  5:28 PM   Alcohol Cap Used No 2/3/2017  5:28 PM        Opportunity for questions and clarification was provided. Patient transported with room air. VTE prophylaxis orders have not been written for Levi Conte. Patient given room number and nurses name. Family updated re: pt status after security code verified.

## 2017-02-05 PROBLEM — K75.0 HEPATIC ABSCESS: Status: ACTIVE | Noted: 2017-01-01

## 2017-02-05 NOTE — ANESTHESIA POSTPROCEDURE EVALUATION
Post-Anesthesia Evaluation and Assessment    Patient: Vimal Salas MRN: [id number]  SSN: [id number]    YOB: 1935  Age: 80 y.o. Sex: male       Cardiovascular Function/Vital Signs  Visit Vitals    /79    Pulse 63    Temp 36.1 °C (97 °F)    Resp 18    Ht 6' 1\" (1.854 m)    Wt 71.7 kg (158 lb)    SpO2 97%    BMI 20.85 kg/m2       Patient is status post general anesthesia for Procedure(s):  ENDOSCOPIC RETROGRADE CHOLANGIOPANCREATOGRAPHY WITH STENTING  ESOPHAGOGASTRODUODENOSCOPY (EGD). Nausea/Vomiting: None    Postoperative hydration reviewed and adequate. Pain:  Pain Scale 1: Numeric (0 - 10) (02/05/17 1402)  Pain Intensity 1: 4 (02/05/17 1402)   Managed    Neurological Status:   Neuro (WDL): Within Defined Limits (02/05/17 1320)  Neuro  Neurologic State: Drowsy (02/05/17 1320)   At baseline    Mental Status and Level of Consciousness: Arousable    Pulmonary Status:   O2 Device: Nasal cannula (02/05/17 1320)   Adequate oxygenation and airway patent    Complications related to anesthesia: None    Post-anesthesia assessment completed.  No concerns    Signed By: Elva Coronado MD     February 5, 2017

## 2017-02-05 NOTE — PROGRESS NOTES
END OF SHIFT NOTE:    Intake/Output      Voiding: YES  Catheter: NO  Drain:   Alli-Deluca Drain 10/21/16 (Active)               Stool:  0 occurrences. Emesis:  0 occurrences. VITAL SIGNS  Patient Vitals for the past 12 hrs:   Temp Pulse Resp BP SpO2   02/04/17 1659 98.5 °F (36.9 °C) (!) 58 16 138/57 98 %   02/04/17 1645 98.5 °F (36.9 °C) (!) 58 16 138/57 98 %   02/04/17 1545 98.9 °F (37.2 °C) 60 16 159/87 98 %   02/04/17 1445 97.9 °F (36.6 °C) 83 16 140/82 98 %   02/04/17 1411 98.3 °F (36.8 °C) 62 16 134/70 98 %   02/04/17 1155 98.9 °F (37.2 °C) 62 16 127/63 97 %   02/04/17 0730 97.8 °F (36.6 °C) 62 18 155/79 98 %       Pain Assessment  Pain 1  Pain Scale 1: Numeric (0 - 10) (02/04/17 1544)  Pain Intensity 1: 0 (02/04/17 1544)  Patient Stated Pain Goal: 0 (02/04/17 1544)  Pain Reassessment 1: Yes (02/04/17 1544)  Pain Onset 1: Approximately 3 years per patient (02/04/17 1444)  Pain Location 1: Shoulder (02/04/17 1444)  Pain Orientation 1: Right (02/04/17 1444)  Pain Description 1: Aching (02/04/17 1444)  Pain Intervention(s) 1: Medication (see MAR) (02/04/17 1444)    Ambulating  YES    Additional Information: tolerating infusions well    Shift report given to oncoming nurse at the bedside.     Luciano Loredo RN

## 2017-02-05 NOTE — PROGRESS NOTES
END OF SHIFT NOTE:    Intake/Output  Not accurate due to pt not saving all output. Voiding: YES    Stool:  4 occurrences per pt. Very loose dark brown/green stool. Emesis:  0 occurrences. VITAL SIGNS  Patient Vitals for the past 12 hrs:   Temp Pulse Resp BP SpO2   02/05/17 0406 98.6 °F (37 °C) 75 16 175/83 97 %   02/05/17 0019 98.8 °F (37.1 °C) 64 16 133/69 98 %   02/04/17 1954 98.5 °F (36.9 °C) (!) 50 16 142/69 98 %       Pain Assessment  Pain 1  Pain Scale 1: Numeric (0 - 10) (02/05/17 0406)  Pain Intensity 1: 0 (02/05/17 0406)  Patient Stated Pain Goal: 0 (02/05/17 0406)  Pain Reassessment 1: Yes (02/04/17 1544)  Pain Onset 1: Approximately 3 years per patient (02/04/17 1444)  Pain Location 1: Shoulder (02/04/17 1444)  Pain Orientation 1: Right (02/04/17 1444)  Pain Description 1: Aching (02/04/17 1444)  Pain Intervention(s) 1: Medication (see MAR) (02/04/17 1444)    Ambulating  YES    Additional Information: Pt states he \"evacuated his bowels\" several times. No bright red blood noted. Loose & very dark brown/green oily appearing stool. Instructed to call for assist with OOB and to save all output for RN to check. Has remained NPO after MN for possible ERCP today. No other changes noted, continuing with current POC. Shift report given to oncoming nurse at the bedside.     Ji Scott RN

## 2017-02-05 NOTE — PROGRESS NOTES
TRANSFER - OUT REPORT:    Verbal report given to Kassie Farmer RN(name) on Alberto Goff  being transferred to PreOp(unit) for ordered procedure       Report consisted of patients Situation, Background, Assessment and   Recommendations(SBAR). Information from the following report(s) SBAR, Kardex, Intake/Output, MAR, Recent Results and Cardiac Rhythm SR was reviewed with the receiving nurse. Lines:   Venous Access Device Upper chest (subclavicular area), left (Active)       Venous Access Device port (Active)   Central Line Being Utilized Yes 2/5/2017  2:30 PM   Criteria for Appropriate Use Long term IV/antibiotic administration 2/5/2017  2:30 PM   Site Assessment Clean, dry, & intact 2/5/2017  2:30 PM   Date of Last Dressing Change 02/03/17 2/5/2017  2:30 PM   Dressing Status Clean, dry, & intact 2/5/2017  2:30 PM   Dressing Type Disk with Chlorhexadine Gluconate (CHG) 2/5/2017  2:30 PM   Positive Blood Return (Medial Site) Yes 2/5/2017  4:55 AM   Action Taken (Medial Site) Blood drawn;Flushed; Infusing 2/5/2017  4:55 AM   Alcohol Cap Used No 2/3/2017  7:35 PM        Opportunity for questions and clarification was provided.       Patient transported with:   [employer]

## 2017-02-05 NOTE — PROGRESS NOTES
Date of Surgery Update:  Gi Harper was seen and examined. History and physical has been reviewed. The patient has been examined.  There have been no significant clinical changes since the completion of the originally dated History and Physical.    Signed By: Jarrell Denis MD     February 5, 2017 9:12 AM

## 2017-02-05 NOTE — PROCEDURES
ENDOSCOPIC  RETROGRADE CHOLANGIOPANCREATOGRAPHY    DATE of PROCEDURE: 2/5/2017    PT NAME: Kalpesh Whyte     [id number]    MEDICATION: general; Glucagon 0.25 mg iv    INSTRUMENT:  [id number] VF    SPECIAL PROCEDURE:none  BLOOD LOSS- 0 to min. SPEC- no  IMPLANT- none    PROCEDURE: standard procedure w/o complications    ASSESSMENT:  1. At least 1 cm necrotic cavity under the surface of duodenal wall that has essentially obliterated the distal CBD-unable to access the biliary system despite multiple maneuvers, expect there is a communication to the duodenal bulb as some food was extracted through the papillotomy site- there was flow of bile so he is not completely obstructed   2. Pancreas not evaluated    3. 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 Abscess drainage as planned  2.  If biliary obstruction occurs, he will need percutaneous drainage- he currently is still draining bile through the papillotomy site     Kayleigh Gramajo MD

## 2017-02-05 NOTE — ANESTHESIA PREPROCEDURE EVALUATION
Anesthetic History     PONV          Review of Systems / Medical History  Patient summary reviewed and pertinent labs reviewed    Pulmonary                   Neuro/Psych              Cardiovascular    Hypertension: well controlled        Dysrhythmias : PVC      Exercise tolerance: >4 METS     GI/Hepatic/Renal     GERD: well controlled           Endo/Other        Cancer and anemia     Other Findings   Comments: Pancreatic cancer 5 years; chemo         Physical Exam    Airway  Mallampati: II  TM Distance: > 6 cm    Mouth opening: Normal     Cardiovascular    Rhythm: regular  Rate: normal    Murmur: Grade 1, Aortic area     Dental    Dentition: Caps/crowns     Pulmonary  Breath sounds clear to auscultation               Abdominal         Other Findings            Anesthetic Plan    ASA: 4  Anesthesia type: general            Anesthetic plan and risks discussed with: Patient

## 2017-02-05 NOTE — PERIOP NOTES
TRANSFER - OUT REPORT:    Verbal report given to ray(name) on Pantera Curtis  being transferred to [facility](unit) for routine post - op       Report consisted of patients Situation, Background, Assessment and   Recommendations(SBAR). Information from the following report(s) SBAR, OR Summary, Procedure Summary and MAR was reviewed with the receiving nurse. Lines:   Venous Access Device Upper chest (subclavicular area), left (Active)       Venous Access Device port (Active)   Central Line Being Utilized Yes 2/5/2017  1:27 PM   Criteria for Appropriate Use Long term IV/antibiotic administration 2/5/2017  1:27 PM   Site Assessment Clean, dry, & intact 2/5/2017  1:27 PM   Date of Last Dressing Change 02/03/17 2/5/2017  1:27 PM   Dressing Status Clean, dry, & intact 2/5/2017  1:27 PM   Dressing Type Disk with Chlorhexadine Gluconate (CHG) 2/5/2017  1:27 PM   Positive Blood Return (Medial Site) Yes 2/5/2017  4:55 AM   Action Taken (Medial Site) Blood drawn;Flushed; Infusing 2/5/2017  4:55 AM   Alcohol Cap Used No 2/3/2017  7:35 PM        Opportunity for questions and clarification was provided. Patient transported with:   O2 @ 2 liters    VTE prophylaxis orders have been written for [facility]. Patient given room number and nurses name. Family updated re: pt status after security code verified.

## 2017-02-05 NOTE — PERIOP NOTES
TRANSFER - IN REPORT:    Verbal report received from Ed SILVER RN on [facility]  being received from 5th floor  for ordered procedure      Report consisted of patients Situation, Background, Assessment and   Recommendations(SBAR). Information from the following report(s) Kardex, MAR and Recent Results was reviewed with the receiving nurse. Opportunity for questions and clarification was provided. Assessment completed upon patients arrival to unit and care assumed.

## 2017-02-05 NOTE — PROGRESS NOTES
TRANSFER - IN REPORT:    Verbal report received from henry medina(name) on Krystle Chavez  being received from [facility]) for routine progression of care      Report consisted of patients Situation, Background, Assessment and   Recommendations(SBAR). Information from the following report(s) SBAR, Procedure Summary and Intake/Output was reviewed with the receiving nurse. Opportunity for questions and clarification was provided. Assessment completed upon patients arrival to unit and care assumed.

## 2017-02-06 NOTE — PROGRESS NOTES
TRANSFER - IN REPORT:    Verbal report received from  [facility]  on Levi Conte  being received from  IR for ordered procedure      Report consisted of patients Situation, Background, Assessment and   Recommendations(SBAR). Information from the following report(s) SBAR and Kardex was reviewed with the receiving nurse. Opportunity for questions and clarification was provided. Assessment completed upon patients arrival to unit and care assumed.

## 2017-02-06 NOTE — PROGRESS NOTES
GI DAILY PROGRESS NOTE    Admit Date:  2/3/2017    Today's Date:  2/6/2017    CC:  Bleeding S/P ERCP, Pancreatic Ca    Subjective:     Patient reports shoulder pain, denies ab pain, N/V. Multiple watery stools overnight w/o bleeding, improved with 2 doses of Imodium. C Diff negative 2/5/17. NPO for CT w PCT today.       Medications:   Current Facility-Administered Medications   Medication Dose Route Frequency    loperamide (IMODIUM) capsule 2 mg  2 mg Oral Q4H PRN    0.9% sodium chloride infusion 500 mL  500 mL IntraVENous CONTINUOUS    fentaNYL citrate (PF) injection 25-50 mcg  25-50 mcg IntraVENous Multiple    lidocaine (XYLOCAINE) 20 mg/mL (2 %) injection  mg   mg IntraDERMal ONCE    midazolam (VERSED) injection 0.5-2 mg  0.5-2 mg IntraVENous Multiple    sodium bicarbonate (NEUT) injection 1-2 mL  1-2 mL SubCUTAneous ONCE    diphenhydrAMINE (BENADRYL) injection 25-50 mg  25-50 mg IntraVENous ONCE    0.9% sodium chloride infusion 250 mL  250 mL IntraVENous PRN    mupirocin (BACTROBAN) 2 % ointment   Topical BID    piperacillin-tazobactam (ZOSYN) 3.375 g in 0.9% sodium chloride (MBP/ADV) 100 mL  3.375 g IntraVENous Q8H    0.9% sodium chloride infusion 250 mL  250 mL IntraVENous PRN    diphenhydrAMINE (BENADRYL) capsule 25 mg  25 mg Oral Q6H PRN    dexamethasone (DECADRON) tablet 1 mg  1 mg Oral DAILY    HYDROcodone-acetaminophen (NORCO)  mg tablet 1 Tab  1 Tab Oral Q6H PRN    zolpidem (AMBIEN) tablet 5 mg  5 mg Oral QHS PRN    dextrose 5 % - 0.45% NaCl infusion  100 mL/hr IntraVENous CONTINUOUS    sodium chloride (NS) flush 5-10 mL  5-10 mL IntraVENous Q8H    sodium chloride (NS) flush 5-10 mL  5-10 mL IntraVENous PRN    HYDROmorphone (PF) (DILAUDID) injection 1 mg  1 mg IntraVENous Q4H PRN    sucralfate (CARAFATE) tablet 1 g  1 g Oral AC&HS    ondansetron (ZOFRAN) injection 4 mg  4 mg IntraVENous Q4H PRN    pantoprazole (PROTONIX) 40 mg in sodium chloride 0.9 % 10 mL injection  40 mg IntraVENous Q24H    0.9% sodium chloride infusion 250 mL  250 mL IntraVENous PRN     Facility-Administered Medications Ordered in Other Encounters   Medication Dose Route Frequency    sodium chloride (NS) flush 10 mL  10 mL IntraVENous PRN       Review of Systems:  ROS was obtained, with pertinent positives as listed above. No chest pain or SOB. Diet:  NPO    Objective:   Vitals:  Visit Vitals    /76 (BP 1 Location: Left arm, BP Patient Position: Supine)    Pulse 85    Temp 98.5 °F (36.9 °C)    Resp 18    Ht 6' 1\" (1.854 m)    Wt 71.7 kg (158 lb)    SpO2 100%    BMI 20.85 kg/m2     Intake/Output:  02/06 0701 - 02/06 1900  In: 2643 [I.V.:1134]  Out: -   02/04 1901 - 02/06 0700  In: 2431 [I.V.:4283]  Out: [address] [facility] [address] [Urine:300]  Exam:  General appearance: alert, cooperative, no distress  Lungs: clear to auscultation bilaterally anteriorly  Heart: regular rate and rhythm  Abdomen: soft, non-tender.  Bowel sounds normal. No masses, no organomegaly  Extremities: extremities normal, atraumatic, no cyanosis or edema  Neuro:  alert and oriented    Data Review (Labs):    Recent Labs      02/05/17   0455  02/04/17   2100  02/04/17   0400  02/03/17   2220   WBC  15.9*   --   16.4*   --    HGB  9.9*  9.4*  8.1*  6.6*   HCT  29.8*   --   24.1*   --    PLT  136*   --   112*   --    MCV  89.5   --   92.0   --    NA  139   --   142   --    K  3.6   --   3.9   --    CL  106   --   112*   --    CO2  24   --   21   --    BUN  32*   --   31*   --    CREA  1.03   --   0.71*   --    CA  7.8*   --   6.4*   --    GLU  102*   --   127*   --    AP  515*   --   270*   --    SGOT  59*   --   35   --    ALT  37   --   21   --    TBILI  1.1   --   0.7   --    ALB  2.0*   --   1.6*   --    TP  5.7*   --   4.5*   --    PTP  12.2*   --    --    --    INR  1.1   --    --    --        ERCP 2/3/17 Dr Donell Goodell  ASSESSMENT:  Invasive pancreatic cancer with duodenal bleed  Stent removed but unable to replace  Massive arterial bleed from the bed of malignant invasion into the duodenal bulb- injected with epi and eventually clotted    CT ABD/P 2/4/17  1. Peribiliary and hepatic abscess. Cannot exclude infected large metastasis  rather than just plain abscess. 2. Intrahepatic biliary distention. 3. New pancreatic metastases and peritoneal implants. Interval progression of  pulmonary metastases. 3. Probable pseudomembranous colitis. 4. Borderline splenomegaly probably indicate some underlying portal  hypertension. ERCP 2/5/17 Dr Raheel Gonzales  ASSESSMENT:  1. At least 1 cm necrotic cavity under the surface of duodenal wall that has essentially obliterated the distal CBD-unable to access the biliary system despite multiple maneuvers, expect there is a communication to the duodenal bulb as some food was extracted through the papillotomy site- there was flow of bile so he is not completely obstructed   2. Pancreas not evaluated   3. 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 Abscess drainage as planned  2. If biliary obstruction occurs, he will need percutaneous drainage- he currently is still draining bile through the papillotomy site            Assessment:     Principal Problem:    Gastrointestinal hemorrhage with hematemesis (2/4/2017)    Active Problems:    GI bleed (2/4/2017)      Hepatic abscess (2/5/2017)      79 yo patient w pancreatic cancer admitted w bleeding after ERCP 2/3/17. Stable s/p transfusion 2/3/17. CT 2/4 w hepatic abscess - IR plans drain today. On Zosyn. C/O diarrhea beginning 2/5, C Diff negative. ERCP 2/5 w deep malignant ulcer in duodenal bulb w/o bleed. 1 cm necrotic cavity under surface of duodenal wall obliterating distal CBD could not access.     Plan:     1) Continue Antibiotics  2) Drain today per IR  3) Oncology consult pending  4) Continue Imodium as needed for diarrhea, will add probiotic also    Noemi Vasquez NP  Patient is seen and examined in collaboration with Dr. Tanisha Sullivan. Assessment and plan as per Dr. Connie Kim.

## 2017-02-06 NOTE — PROGRESS NOTES
Pharmacokinetic Consult to Pharmacist    William Sabina is a 80 y.o. male being treated for Hepatic Abscess with Vancomycin and Zosyn. Height: 6' 1\" (185.4 cm)  Weight: 71.7 kg (158 lb)  Lab Results   Component Value Date/Time    BUN 22 02/06/2017 10:30 AM    Creatinine 0.95 02/06/2017 10:30 AM    WBC 12.5 02/06/2017 10:30 AM    Procalcitonin 1.2 07/16/2016 04:40 PM      Estimated Creatinine Clearance: 61.8 mL/min (based on Cr of 0.95). CULTURES:  2/6   Body fluid/liver culture  Pending      Day 1 of vancomycin. Goal trough is 15-20. Vancomycin dose initiated at 1000 mg Q12H. Will plan to check vancomycin trough level prior to the 4th dose. Will continue to follow patient.       Thank you,  Barry Scales, PharmD  Clinical Pharmacist  [phone]

## 2017-02-06 NOTE — ROUTINE PROCESS
TRANSFER - OUT REPORT:    Verbal report given to Susan Graciela  on William Sousar  being transferred to [facility] for routine post - op       Report consisted of patients Situation, Background, Assessment and   Recommendations(SBAR). Information from the following report(s) SBAR, Procedure Summary and MAR was reviewed with the receiving nurse. Opportunity for questions and clarification was provided. Conscious Sedation:   150 Mcg of Fentanyl administered  4 Mg of Versed administered    Pt tolerated procedure well. Visit Vitals    /73    Pulse 78    Temp 98.4 °F (36.9 °C)    Resp 18    Ht 6' 1\" (1.854 m)    Wt 71.7 kg (158 lb)    SpO2 96%    BMI 20.85 kg/m2     Past Medical History   Diagnosis Date    Anemia      last blood transfusion 5-2016    GERD (gastroesophageal reflux disease)      controlled with med    Hypertension      managed with meds    Nausea & vomiting      some N/V, pt does well with antiemetic    Pancreatic cancer (HCC)      oral chemo    Portacath in place      chest port in place for chemo    Prostate cancer ([facility] [address]      resulted in prostatectomy                 Venous Access Device port (Active)   Central Line Being Utilized Yes 2/6/2017  8:12 AM   Criteria for Appropriate Use Long term IV/antibiotic administration 2/6/2017  8:12 AM   Site Assessment Clean, dry, & intact 2/6/2017  8:12 AM   Date of Last Dressing Change 02/03/17 2/6/2017  8:12 AM   Dressing Status Clean, dry, & intact 2/6/2017  8:12 AM   Dressing Type Disk with Chlorhexadine Gluconate (CHG); Transparent 2/6/2017  8:12 AM   Date Accessed (Medial Site) 02/03/17 2/6/2017 12:40 AM   Positive Blood Return (Medial Site) Yes 2/6/2017  8:12 AM   Action Taken (Medial Site) Infusing 2/6/2017  8:12 AM   Alcohol Cap Used No 2/3/2017  7:35 PM     [facility] Drain 10/21/16 (Active)       Drain abdominal drain  02/06/17 Right; Anterior Abdomen (Active)

## 2017-02-06 NOTE — ROUTINE PROCESS
TRANSFER - OUT REPORT:    Verbal report given to Zoë Mills RN  on Ivan Mood  being transferred to  [id number] for routine post - op       Report consisted of patients Situation, Background, Assessment and   Recommendations(SBAR). Information from the following report(s) SBAR, Procedure Summary and MAR was reviewed with the receiving nurse. Opportunity for questions and clarification was provided.       Patient transported with:   Registered Nurse

## 2017-02-06 NOTE — CONSULTS
Infectious Disease Consult    Today's Date: 2/6/2017   Admit Date: 2/3/2017    Impression:   · Peribiliary/ R hepatic lobe abscess (CT 2/4): s/p (2/6) CT drainage: cx pending  · Stage 4 metastatic pancreatic cancer (mets to liver, lungs) ongoing cholangitis: s/p 2/3 ERCP with stent removal and discovery of abscess with bile drainage via papillotomy site  · Leukocytosis    Plan:   · Continue Zosyn, start Vancomycin  · Follow cultures from drainage today    Anti-infectives:   Zosyn (2/3-  Vancomycin (2/6-  Cipro pre-admission  Subjective:   Date of Consultation:  February 6, 2017  Referring Physician: Dr. Jazlyn Man     Patient is a 80 y.o. male well known to infectious disease from hx of polymicrobial hepatic abscesses resulting from metastatic pancreatic cancer, last seen by Dr. Klaudia Benavides 9/2015 in the ID office after he had completed 3 weeks of Zosyn and was transitioned to Augmentin for an additional 3 weeks, for Citrobacter, Enterococcus and Prevotella abscess; he never returned for follow up. It appears has needed multiple GI interventions, specifically ERCPs with stent exchanges; in 10/2016 found to have a MRSA abscess which was drained, and he was treated with 10-14 day. He was admitted after his ERCP 2/3/17, where he was found to have an abscess, stent removed however not replaced sec to massive bleed. He did not have an obvious bilious blockage, with bile drainage via papillotomy site, with possible communication to the duodenal bulb. A CT after admission on 2/4/17 noted a 4.7x2.5x2.1 cm peribiliary R hepatic lobe abscess, probable pseudomembranous colitis, new pancreatic mets. C.diff test negative. He has been started on Zosyn, and ID consulted for antbx recommendations. He is scheduled for a CT guided drainage of the abscess cavity today. He has no fevers, and his WBC count has improved since admission 12.5<--18. 3.  Pt has been on Cipro since ~11/2016 prescribed by oncology and was taking it until his admission. He reports no fever, chills, sweats at home. He was having no symptoms when he was admitted.        Patient Active Problem List   Diagnosis Code    Hypomagnesemia E83.42    Neuropathy G62.9    Cancer (HCC) C80.1    Hyperbilirubinemia E80.6    Chemotherapy induced neutropenia (HCC) D70.1, T45.1X5A    Thrombocytopenia, unspecified (HCC) D69.6    GERD (gastroesophageal reflux disease) K21.9    Anemia D64.9    Hypertension I10    Leukocytosis D72.829    Lung nodule R91.1    Elevated LFTs R79.89    Lung nodules R91.8    Right upper quadrant abdominal pain R10.11    Malignant neoplasm of head of pancreas (HCC) C25.0    Gastrointestinal hemorrhage with hematemesis K92.0    GI bleed K92.2    Hepatic abscess K75.0     Past Medical History   Diagnosis Date    Anemia      last blood transfusion 5-2016    GERD (gastroesophageal reflux disease)      controlled with med    Hypertension      managed with meds    Nausea & vomiting      some N/V, pt does well with antiemetic    Pancreatic cancer ([address]      oral chemo    Portacath in place      chest port in place for chemo    Prostate cancer ([address]      resulted in prostatectomy      Family History   Problem Relation Age of Onset    Stroke Father     Heart Attack Father     Heart Disease Father     No Known Problems Mother     No Known Problems Brother       Social History   Substance Use Topics    Smoking status: Former Smoker     Years: 4.00     Quit date: 1/1/1959    Smokeless tobacco: Never Used    Alcohol use 1.2 - 1.8 oz/week     2 - 3 Shots of liquor per week     Past Surgical History   Procedure Laterality Date    Hx prostatectomy      Hx appendectomy      Hx lap cholecystectomy      Hx hernia repair Right      R  hernia repair with mesh     Hx endoscopy       multiple ERCP with stent     Hx vascular access       chest port    Hx ercp  06/23/2016     stent change    Hx ercp  11/28/2016     5cm- 10 fr double pigtail    Hx cholecystectomy        Prior to Admission medications    Medication Sig Start Date End Date Taking? Authorizing Provider   Omeprazole delayed release (PRILOSEC D/R) 20 mg tablet Take 20 mg by mouth daily. Yes Historical Provider   magnesium oxide (MAG-OX) 400 mg tablet Take 400 mg by mouth. Yes Historical Provider   lubiPROStone (AMITIZA) 8 mcg capsule Strength: 8 mcg; Form: ; SIG: take 1 tab by mouth 2 times a day for 30 days 1/25/17  Yes Priyanka Ricketts MD   zolpidem (AMBIEN) 5 mg tablet Take 1 Tab by mouth nightly as needed for Sleep. Max Daily Amount: 5 mg. 1/20/17  Yes Taryn Evans MD   HYDROcodone-acetaminophen [facility])  mg tablet Take 1 Tab by mouth every six (6) hours as needed for Pain. Max Daily Amount: 4 Tabs. 1/20/17  Yes Taryn Evans MD   amylase-lipase-protease (CREON) 24,000-76,000 -120,000 unit capsule Take 1 Cap by mouth three (3) times daily (with meals). 1/5/17  Yes Taryn Evans MD   atenolol (TENORMIN) 50 mg tablet Take 1 Tab by mouth every morning. 12/14/16  Yes Taryn Evans MD   B.infantis-B.ani-B.long-B.bifi (PROBIOTIC 4X) 10-15 mg TbEC Take 1 Tab by mouth every morning. Yes Historical Provider   pembrolizumab (KEYTRUDA) 100 mg/4 mL (25 mg/mL) injection 28.84 mL by IntraVENous route every twenty-one (21) days. Patient taking differently: 10 mg/kg by IntraVENous route every twenty-one (21) days. Has not started 2/3/17   Taryn Evans MD   dexamethasone (DECADRON) 1 mg tablet  1/13/17   Historical Provider   polyethylene glycol (MIRALAX) 17 gram/dose powder Take 17 g by mouth daily. Historical Provider   senna-docusate (PERICOLACE) 8.6-50 mg per tablet Take 1 Tab by mouth as needed.     Historical Provider       Allergies   Allergen Reactions    Demerol [Meperidine] Nausea and Vomiting    Megace [Megestrol] Rash    Morphine Other (comments)     Severe sweating     Tape [Adhesive] Other (comments)     Paper tape ok  - redness on skin         Review of Systems:  A comprehensive review of systems was negative except for that written in the History of Present Illness. Objective:     Visit Vitals    /71    Pulse 78    Temp 98.4 °F (36.9 °C)    Resp 20    Ht 6' 1\" (1.854 m)    Wt 71.7 kg (158 lb)    SpO2 98%    BMI 20.85 kg/m2     Temp (24hrs), Av.1 °F (36.7 °C), Min:97.5 °F (36.4 °C), Max:98.7 °F (37.1 °C)       Lines:  Peripheral IV:  RUE intact          Physical Exam:    General:  Alert, cooperative, well noursished, well developed, appears stated age   Eyes:  Sclera anicteric. Pupils equally round and reactive to light. Mouth/Throat: Mucous membranes normal, oral pharynx clear   Neck: Supple   Lungs:   Clear to auscultation bilaterally, good effort   CV:  Regular rate and rhythm,no murmur, click, rub or gallop   Abdomen:   Soft, non-tender. bowel sounds normal. non-distended, RLQ drain: bilious output   Extremities: No cyanosis or edema   Skin: Skin color, texture, turgor normal. no acute rash or lesions   Lymph nodes: Cervical and supraclavicular normal   Musculoskeletal: No swelling or deformity   Lines/Devices:  Intact, no erythema, drainage or tenderness   Psych: Alert and oriented, normal mood affect given the setting       Data Review:     CBC:  Recent Labs      170  17   2100  17   0400   WBC  12.5*  15.9*   --   16.4*   HGB  9.1*  9.9*  9.4*  8.1*   HCT  27.7*  29.8*   --   24.1*   PLT  115*  136*   --   112*       BMP:  Recent Labs      17   1030  17   0455  17   0400   CREA  0.95  1.03  0.71*   BUN  22  32*  31*   NA  141  139  142   K  3.3*  3.6  3.9   CL  108*  106  112*   CO2  23  24  21   AGAP  10  9  9   GLU  108*  102*  127*       LFTS:  Recent Labs      17   1030  17   0455  17   0400   TBILI  1.1  1.1  0.7   ALT  42  37  21   SGOT  51*  59*  35   AP  560*  515*  270*   TP  5.6*  5.7*  4.5*   ALB  1.9*  1.9*  2.0*  1.6*       Microbiology:      All Micro Results     Procedure Component Value Units Date/Time    C. DIFFICILE/EPI PCR [[id number]] Collected:  17 2044    Order Status:  Completed Specimen:  Stool Updated:  17 2211     Special Requests: NO SPECIAL REQUESTS        Culture result: NEGATIVE                  Toxigenic C. diff NEGATIVE/ [id number]-[id number] PRESUMPTIVE NEGATIVE    MRSA SCREEN - PCR (NASAL) [[id number]] Collected:  17 2140    Order Status:  Completed Specimen:  Nasal from Nares Updated:  17 0121     Special Requests: NO SPECIAL REQUESTS        Culture result:         MRSA target DNA is detected (presumptive positive for MRSA colonization). RESULTS VERIFIED, PHONED TO AND READ BACK BY  STACY CONWAY RN AT [id number] ON [id number] BY [facility]             Imagin/4/17 CT abd w/ contrast  IMPRESSION:  1. Peribiliary and hepatic abscess. Cannot exclude infected large metastasis rather than just plain abscess. 2. Intrahepatic biliary distention. 3. New pancreatic metastases and peritoneal implants. Interval progression of pulmonary metastases. 3. Probable pseudomembranous colitis. 4. Borderline splenomegaly probably indicate some underlying portal hypertension.     Signed By: Dilshad Summers NP     2017

## 2017-02-06 NOTE — CONSULTS
Inpatient Hematology / Oncology Consult Note    Reason for Consult:  Malignant neoplasm of other parts of pancreas [facility]) [C25.7]  Referring Physician:  Rivka Butler MD    History of Present Illness:  Mr. Kira Lopes is an 79 yo male patient known to Dr. Yue Dacosta for treatment of stage IV pancreatic cancer. He was seen in clinic by Dr. Yue Dacosta on 02/03/2017 and complained of having chills. His WBC was 18,000 and he also had increased CA-19-9. Dr. Yue Dacosta contacted Dr. Mable Biswas and Mr. Kira Lopes was scheduled for bilary stent replacement. He was taken for ERCP to remove and replace stent however he developed a massive bleed. Bleeding was stopped. Stent was removed but not replaced. Today he is NPO for CT w PCT today. Review of Systems:  Constitutional Denies fever, chills, +weight loss, +appetite changes, +fatigue   HEENT Denies trauma, blurry vision, hearing loss, ear pain, nosebleeds, sore throat, neck pain and ear discharge. Skin Denies lesions or rashes. Lungs Denies dyspnea, cough, sputum production or hemoptysis. Cardiovascular Denies chest pain, palpitations, or lower extremity edema. Gastrointestinal Denies nausea, vomiting, changes in bowel habits, bloody or black stools    Denies dysuria, frequency or hesitancy of urination. Neuro Denies headaches, visual changes or ataxia. Denies dizziness, tingling, tremors, sensory change, speech change, focal weakness or headaches. Hematology Denies easy bruising or bleeding, denies gingival bleeding or epistaxis. Endo Denies heat/cold intolerance, denies diabetes or thyroid abnormalities. MSK Denies back pain, arthralgias, myalgias or frequent falls. Psychiatric/Behavioral The patient is not nervous/anxious.          Allergies   Allergen Reactions    Demerol [Meperidine] Nausea and Vomiting    Megace [Megestrol] Rash    Morphine Other (comments)     Severe sweating     Tape [Adhesive] Other (comments)     Paper tape ok  - redness on skin      Past Medical History   Diagnosis Date    Anemia      last blood transfusion 5-2016    GERD (gastroesophageal reflux disease)      controlled with med    Hypertension      managed with meds    Nausea & vomiting      some N/V, pt does well with antiemetic    Pancreatic cancer ([facility] [address]      oral chemo    Portacath in place      chest port in place for chemo    Prostate cancer ([facility] [address]      resulted in prostatectomy     Past Surgical History   Procedure Laterality Date    Hx prostatectomy      Hx appendectomy      Hx lap cholecystectomy      Hx hernia repair Right      R  hernia repair with mesh     Hx endoscopy       multiple ERCP with stent     Hx vascular access       chest port    Hx ercp  06/23/2016     stent change    Hx ercp  11/28/2016     5cm- 10 fr double pigtail    Hx cholecystectomy       Family History   Problem Relation Age of Onset    Stroke Father     Heart Attack Father     Heart Disease Father     No Known Problems Mother     No Known Problems Brother      Social History     Social History    Marital status:      Spouse name: N/A    Number of children: N/A    Years of education: N/A     Occupational History    retired      15 years    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     Social History Main Topics    Smoking status: Former Smoker     Years: 4.00     Quit date: 1/1/1959    Smokeless tobacco: Never Used    Alcohol use 1.2 - 1.8 oz/week     2 - 3 Shots of liquor per week    Drug use: No    Sexual activity: Not on file     Other Topics Concern    Not on file     Social History Narrative     Current Facility-Administered Medications   Medication Dose Route Frequency Provider Last Rate Last Dose    loperamide (IMODIUM) capsule 2 mg  2 mg Oral Q4H PRN VAMSI Thornton MD   2 mg at 02/06/17 0817    0.9% sodium chloride infusion 250 mL  250 mL IntraVENous PRN VAMSI Thornton MD        mupirocin (BACTROBAN) 2 % ointment   Topical BID VAMSI Thornton MD  piperacillin-tazobactam (ZOSYN) 3.375 g in 0.9% sodium chloride (MBP/ADV) 100 mL  3.375 g IntraVENous Q8H C Donell Goodell, MD 25 mL/hr at 02/06/17 0041 3.375 g at 02/06/17 0041    0.9% sodium chloride infusion 250 mL  250 mL IntraVENous PRN C Donell Goodell, MD        diphenhydrAMINE (BENADRYL) capsule 25 mg  25 mg Oral Q6H PRN C Donell Goodell, MD   25 mg at 02/04/17 1331    dexamethasone (DECADRON) tablet 1 mg  1 mg Oral DAILY C Donell Goodell, MD   1 mg at 02/06/17 0811    HYDROcodone-acetaminophen (NORCO)  mg tablet 1 Tab  1 Tab Oral Q6H PRN C Donell Goodell, MD   1 Tab at 02/05/17 1946    zolpidem (AMBIEN) tablet 5 mg  5 mg Oral QHS PRN C Donell Goodell, MD   5 mg at 02/05/17 1946    dextrose 5 % - 0.45% NaCl infusion  100 mL/hr IntraVENous CONTINUOUS C Donell Goodell,  mL/hr at 02/06/17 0009 100 mL/hr at 02/06/17 0009    sodium chloride (NS) flush 5-10 mL  5-10 mL IntraVENous Q8H C Donell Goodell, MD   10 mL at 02/05/17 6737    sodium chloride (NS) flush 5-10 mL  5-10 mL IntraVENous PRN C Donell Goodell, MD        HYDROmorphone (PF) (DILAUDID) injection 1 mg  1 mg IntraVENous Q4H PRN C Donell Goodell, MD        sucralfate (CARAFATE) tablet 1 g  1 g Oral AC&HS C Donell Goodell, MD   1 g at 02/06/17 7677    ondansetron (ZOFRAN) injection 4 mg  4 mg IntraVENous Q4H PRN C Donell Goodell, MD        pantoprazole (PROTONIX) 40 mg in sodium chloride 0.9 % 10 mL injection  40 mg IntraVENous Q24H C Donell Goodell, MD   40 mg at 02/05/17 1946    0.9% sodium chloride infusion 250 mL  250 mL IntraVENous PRN C Donell Goodell, MD         Facility-Administered Medications Ordered in Other Encounters   Medication Dose Route Frequency Provider Last Rate Last Dose    sodium chloride (NS) flush 10 mL  10 mL IntraVENous PRN Carla Glasgow MD           OBJECTIVE:  Patient Vitals for the past 8 hrs:   BP Temp Pulse Resp SpO2   02/06/17 0744 154/76 98.5 °F (36.9 °C) 85 18 100 %   17 0342 135/68 97.5 °F (36.4 °C) 64 16 97 %     Temp (24hrs), Av.9 °F (36.6 °C), Min:97 °F (36.1 °C), Max:98.7 °F (37.1 °C)    701 -  1900  In: 1134 [I.V.:1134]  Out: -     Physical Exam:  Constitutional: Well developed, well nourished male in no acute distress, sitting comfortably in the hospital bed. Daughter at bedside. HEENT: Normocephalic and atraumatic. Oropharynx is clear, mucous membranes are moist. Extraocular muscles are intact. Sclerae anicteric. Lymph node   Deferred   Skin Warm and dry. No bruising and no rash noted. No erythema. No pallor. Respiratory Lungs are clear to auscultation bilaterally without wheezes, rales or rhonchi, normal air exchange without accessory muscle use. CVS Normal rate, regular rhythm and normal S1 and S2. No murmurs, gallops, or rubs. Abdomen Soft, nontender and nondistended, normoactive bowel sounds. No palpable mass. No hepatosplenomegaly. Neuro Grossly nonfocal with no obvious sensory or motor deficits. MSK Normal range of motion in general.  No edema and no tenderness. Psych Appropriate mood and affect. Labs:    Recent Results (from the past 24 hour(s))   C. DIFFICILE/EPI PCR    Collection Time: 17  8:44 PM   Result Value Ref Range    Special Requests: NO SPECIAL REQUESTS      Culture result: NEGATIVE       Culture result:        Toxigenic C. diff NEGATIVE/ 027-NAP1-BI PRESUMPTIVE NEGATIVE       Imagin2017 CT AB      HISTORY: Bleed. Metastatic pancreatic carcinoma. Previous hepatic abscess.     CT imaging through the abdomen was performed following administration of oral  and intravenous contrast material.     Axial and coronal reformatted images were reviewed.     Radiation dose reduction techniques were used for the study.  Our CT scanners use  one or all of the following: Automated exposure control, adjustment of the mA  and or kV according to patient size, and use of iterative reconstruction.     COMPARISON: 11/16/2016.     A 4.7 x 2.5 x 2.1 cm collection of air with fluid level is demonstrated  extending into the right lobe of the liver, segment 6, from the darlin hepatis  and lateral to the head of the pancreas, just cephalad to the duodenum. This has  the appearance of an abscess, closely adjacent to the common hepatic and common  bile duct is no longer demonstrate a plastic stent present within. There is mild  to moderate intrahepatic biliary distention with some interval biliary air in  the nondependent portions of the biliary tree. In addition, there are scattered  low density mass is now demonstrated within both lobes of liver highly  suspicious for metastatic disease from the patient's known primary pancreatic  carcinoma. Furthermore, peritoneal implants are demonstrated measuring  approximately 2.5 and 3.4 cm within the right subdiaphragmatic space adjacent to  the liver capsule. There is also interval progression of pulmonary nodules,  presumably representing metastatic disease, with a new rim-enhancing mass  measuring up to 2.0 cm in the deep right posterior lateral costophrenic sulcus. Additional interval enlargement of pulmonary metastases are seen in the medial  segment of the right middle lobe and in the left lower lobe, with associated  pleural thickening.     Compared with previous, there is presence of mild to moderate ascites and  anasarca suggesting increased liver dysfunction.     New interval appearance of diffuse edematous colon is demonstrated and  suspicious for pseudomembranous colitis.     Small sliding hiatal hernia of the stomach is demonstrated.     Pancreatic mass is essentially unchanged however there is now dilatation of the  pancreatic duct, which measures up to 7-9 mm.  Small bowel loops are well filled  with oral contrast material. Densely calcified atherosclerotic plaque is  demonstrated the ostia of the celiac, superior mesenteric, main bilateral renal  arteries as well as the distal abdominal aorta and proximal common iliac  arteries. Both kidneys excrete contrast material without hydronephrosis. The  spleen measures up to 14.1 cm, essentially unchanged in size from previous  allowing for slight differences in measurement. Pleural calcifications  demonstrated in the right hemithorax may indicate previous exposure.     No acute osseous pathology.     IMPRESSION  IMPRESSION:  1. Peribiliary and hepatic abscess. Cannot exclude infected large metastasis  rather than just plain abscess. 2. Intrahepatic biliary distention. 3. New pancreatic metastases and peritoneal implants. Interval progression of  pulmonary metastases. 3. Probable pseudomembranous colitis. 4. Borderline splenomegaly probably indicate some underlying portal  hypertension.          ASSESSMENT:  Problem List  Date Reviewed: 2/3/2017          Codes Class Noted    Hepatic abscess ICD-10-CM: K75.0  ICD-9-CM: 572.0  2/5/2017        * (Principal)Gastrointestinal hemorrhage with hematemesis ICD-10-CM: K92.0  ICD-9-CM: 578.0  2/4/2017        GI bleed ICD-10-CM: K92.2  ICD-9-CM: 578.9  2/4/2017        Malignant neoplasm of head of pancreas ([facility] [address] ICD-10-CM: C25.0  ICD-9-CM: 157.0  2/3/2017        Right upper quadrant abdominal pain ICD-10-CM: R10.11  ICD-9-CM: 789.01  8/23/2016        Lung nodules ICD-10-CM: R91.8  ICD-9-CM: 793.19  8/14/2015        Leukocytosis ICD-10-CM: D72.829  ICD-9-CM: 288.60  7/28/2015        Lung nodule ICD-10-CM: R91.1  ICD-9-CM: 793.11  7/28/2015    Overview Signed 7/28/2015  9:48 AM by Los Ortiz NP     Impression: Stable exam except to note a new 7 mm nodular density within the  right upper lung.  This could be further evaluated with CT as an outpatient             Elevated LFTs ICD-10-CM: R79.89  ICD-9-CM: 790.6  7/28/2015        GERD (gastroesophageal reflux disease) (Chronic) ICD-10-CM: K21.9  ICD-9-CM: 530.81  3/25/2015    Overview Signed 3/25/2015 9: 40 AM by Satira Bench     controlled with med             Anemia (Chronic) ICD-10-CM: D64.9  ICD-9-CM: 285.9  3/25/2015        Hypertension (Chronic) ICD-10-CM: I10  ICD-9-CM: 401.9  3/25/2015    Overview Signed 3/25/2015  9:38 AM by Satiyousif Bench     controlled with med             Thrombocytopenia, unspecified ([facility] ICD-10-CM: D69.6  ICD-9-CM: 287.5  10/28/2014        Chemotherapy induced neutropenia ([facility] ICD-10-CM: D70.1, T45.1X5A  ICD-9-CM: 288.03, E933.1  9/2/2014        Hyperbilirubinemia ICD-10-CM: E80.6  ICD-9-CM: 782.4  7/24/2014        Cancer ([facility] ICD-10-CM: C80.1  ICD-9-CM: 199.1  9/16/2013        Neuropathy ICD-10-CM: G62.9  ICD-9-CM: 355.9  7/17/2013    Overview Signed 7/17/2013 11:57 AM by Haydee Hinds     7-17-13 new onset will hold therapy             Hypomagnesemia ICD-10-CM: F65.41  ICD-9-CM: 275.2  7/31/2012                RECOMMENDATIONS:  Per Dr. Nikki Guerrier, there is at least 1 cm necrotic cavity under the surface of duodenal wall that has essentially obliterated the distal CBD-unable to access the biliary system despite multiple maneuvers, expect there is a communication to the duodenal bulb as some food was extracted through the papillotomy site- there was flow of bile so he is not completely obstructed. The plan is to drain the abscess and if a bilary obstruction occurs, he will need percutaneous drainage. Stage IV pancreatic cancer (s/p long Rx of FOLFIRINOX and SBRT x2, second line gem/abraxane, third line xeloda/jakafi)  -Keytruda planned for 2/15/27. Lengthy discussion with Mr. Tra Vargas and his daughter regarding the issues surrounding current hospitalization. CT Abdomen showed peribiliary and hepatic abscess. Cannot exclude infected large metastasis rather than just plain abscess and new pancreatic metastases and peritoneal implants as well as interval progression of pulmonary metastases. We discussed his further treatment options vs no treatment.  Mr. Tra Vargas was encouraged to have conversations with his wife and family regarding end of life wishes. Malnutrition  -consult nutritionist for short term TPN    Hepatic abscess  -continue Zosyn, consult ID    We will follow along closely with GI.         Lab studies and imaging studies were personally reviewed. Pertinent old records were reviewed. Thank you for allowing us to participate in the care of Mr. Karolina Hernandez. Anderson Moreno NP   [facility]  [address] [place] [address]  Office : [phone]  Fax : [phone]   Patient seen, examed and discussed with NP, agree with above history/assessment/plan. 80 y.o.male stage IV pancreatic cancer s/p multiple lines of therapies, developed leukocytosis and chills on 2/3/17 and received biliary stent eval and complicated with massive bleeding and admitted. I have had extensive discussion with Dr. Gabo Baer, and discussed with pt and family regarding the eminent issues and [address]. We agreed to first aim at managing the liver abscess with IR drain, the necrotic cavity adjacent to duodenum with observation, consult ID for the abscess and colitis rx and prefer to have an outpt regimen to be given at home. We need to determine how the duodenum/CBD cavity affect his diet, for now start TPN transiently. We also discussed incurable cancer and further options of comfort only vs Keytruda, he desires to prolong life if possible, and [place] had good trial result as a novel immunotherapy in his setting and it should have low risk interrupting with above rx, tentatively to arrange for next week. Alejandro Wilkes M.D.   [address]  Office : [phone]  Fax : [phone]

## 2017-02-06 NOTE — PROGRESS NOTES
Interventional Radiology Prep Area Handoff      Allergies   Allergen Reactions    Demerol [Meperidine] Nausea and Vomiting    Megace [Megestrol] Rash    Morphine Other (comments)     Severe sweating     Tape [Adhesive] Other (comments)     Paper tape ok  - redness on skin        IRSummary reviewed. Pt identified with two identifiers. Verified procedure with Patient and IR Provider as abscess drain. Consent obtained: yes H&P complete/updated: yes MD airway complete: yes    Sedation:yes   NPO: yes   Anticoagulation: no     Pt shaved: yes   Antibiotics:no   Anesthesia notified: n/a    Additional Notes: none      Lines:  Peripherally Inserted Central Catheter:     Central Venous Catheter:     Venous Access Device:  Venous Access Device port (Active)   Central Line Being Utilized Yes 2/6/2017 12:40 AM   Criteria for Appropriate Use Long term IV/antibiotic administration 2/6/2017 12:40 AM   Site Assessment Clean, dry, & intact 2/6/2017 12:40 AM   Date of Last Dressing Change 02/03/17 2/6/2017 12:40 AM   Dressing Status Clean, dry, & intact 2/6/2017 12:40 AM   Dressing Type Disk with Chlorhexadine Gluconate (CHG); Transparent 2/6/2017 12:40 AM   Date Accessed (Medial Site) 02/03/17 2/6/2017 12:40 AM   Positive Blood Return (Medial Site) Yes 2/5/2017  8:10 PM   Action Taken (Medial Site) Blood drawn;Flushed; Infusing 2/5/2017  4:55 AM   Alcohol Cap Used No 2/3/2017  7:35 PM     Peripheral Intravenous Line:     Hemodialysis Catheter:     Drain(s):  Alli-Deluca Drain 10/21/16 (Active)       Labs verified:   Recent Results (from the past 24 hour(s))   C.  DIFFICILE/EPI PCR    Collection Time: 02/05/17  8:44 PM   Result Value Ref Range    Special Requests: NO SPECIAL REQUESTS      Culture result: NEGATIVE       Culture result:        Toxigenic C. diff NEGATIVE/ 027-NAP1-BI PRESUMPTIVE NEGATIVE   CBC W/O DIFF    Collection Time: 02/06/17 10:30 AM   Result Value Ref Range    WBC 12.5 (H) 4.3 - 11.1 K/uL    RBC 3.05 (L) 4.23 - 5.67 M/uL    HGB 9.1 (L) 13.6 - 17.2 g/dL    HCT 27.7 (L) 41.1 - 50.3 %    MCV 90.8 79.6 - 97.8 FL    MCH 29.8 26.1 - 32.9 PG    MCHC 32.9 31.4 - 35.0 g/dL    RDW 18.4 (H) 11.9 - 14.6 %    PLATELET 313 (L) 564 - 450 K/uL    MPV 10.2 (L) 10.8 - 14.1 FL     Patient Vitals for the past 8 hrs:   Temp Pulse Resp BP SpO2   02/06/17 1115 98.4 °F (36.9 °C) 68 18 152/77 95 %   02/06/17 0744 98.5 °F (36.9 °C) 85 18 154/76 100 %   02/06/17 0342 97.5 °F (36.4 °C) 64 16 135/68 97 %

## 2017-02-06 NOTE — PROCEDURES
Interventional Radiology Brief Procedure Note    Patient: Devang Bowen MRN: [id number]  SSN: [id number]    YOB: 1935  Age: 80 y.o. Sex: male      Date of Procedure: 2/6/2017    Pre-Procedure Diagnosis: hepatic/peribiliary abscess; pancreatic CA with mets and biliary obstruction. Post-Procedure Diagnosis: infected bileoma, contained perforation of duodenum or CBD. ? Tumor infiltration with necrotic perforation. Procedure(s): Paracentesis, with CT guidance, Ct drainage with 10 Fr APD placed into abscess. Brief Description of Procedure: Ct , needed paracentesis prior to abscess drainage. Performed By: Francisca Haas MD     Assistants: None    Anesthesia: Moderate Sedation    Estimated Blood Loss: Less than 10ml     Specimens: ascites, old infected bile. Implants:  All Purpose Drain    Findings: infected  bileoma, due to perforation of duodenum or CBD    Complications: None    Recommendations: follow cultures, observe drainage,  Hospice    Follow Up: GI, oncology, liver function,IR drain contrast check in 2 weeks as indicated    Signed By: Francisca Haas MD     February 6, 2017

## 2017-02-06 NOTE — PROGRESS NOTES
1900-Report received from Mily Faust, Critical access hospital[facility]. Resting in bed. No needs voiced. No s/s of acute distress. 2010-Stool specimen collected and sent to lab. Pt very independent, but acknowledges that he is weak. Encouraged to call for assistance when OOB and brief placed on patient with his agreement to prevent need to get OOB urgently. 2045-Filled brief. This is his 3rd stool this shift. 0032-Negative for C-diff. Dr. Álvaro Wagner notified. New orders received.

## 2017-02-06 NOTE — ADDENDUM NOTE
Addendum  created 02/06/17 0703 by Waleska Lozada CRNA    Anesthesia Event edited, Anesthesia Intra Flowsheets edited

## 2017-02-06 NOTE — PROGRESS NOTES
Patient return to room Right CT place drainage bag intact patent, draining dark brown colored fluid.

## 2017-02-06 NOTE — PROGRESS NOTES
Interventional Radiology Post Paracentesis/Thoracentesis Note    2/6/2017    Procedure(s): CT guided Therapeutic Paracentesis Performed with 8 [place] of [place] catheter total volume 1150 ml. Preliminary Findings: medium cloudy. Complications: None    Plan:  Observation, check labs if drawn.           Chest X-Ray:  no    Full dictated report to follow    Signed By: Zan Stokes RN

## 2017-02-06 NOTE — PROGRESS NOTES
Problem: Nutrition Deficit  Goal: *Optimize nutritional status  Nutrition  Reason for assessment: Consult for TPN management per nutritional support protocols JUS Farias  Assessment:   Diet order(s): CLQ 2/3, NPO 2/4  Food/Nutrition History: Patient with h/o stage IV pancreatic cancer with mets to liver and lung, admitted with biliary stricture s/p ERCP with hemorrhage. Central line access : Port  Pertinent Medications: NS 25ml/hr, D5 1/2 NS 100ml/hr, dexamethasone, fentanyl, protonix, zosyn, florastor  Pertinent Labs: Na 141, Cl 108, C02 23. K 3.3, Phos 2.2, Mg 1.9, glucose 108, corrected calcium 9.28  Anthropometrics:Height: 6' 1\" (185.4 cm), Weight Source: Patient stated, Weight: 71.7 kg (158 lb), Body mass index is 20.85 kg/(m^2). [place] BMI class of underweight for age  Macronutrient needs:  EER:  7550-2638 kcal /day (25-30 kcal/kg listed BW)(86% IBW)  EPR:   grams protein/day (1-1.2 grams/kg IBW)(GFR >60)  Max CHO:  412 grams/day (4mg/kg ABW/min)  Fluid:  1ml/kcal  Intake/Comparative Standards: Current NPO status does not meet kcal or protein needs     Nutrition Diagnosis: Inadequate oral intake r/t altered GI function as evidenced by patient with large GIB, NPO. Intervention:   Meals and snacks: NPO  Nutritional Supplement Therapy: Electrolyte replacement per nutritional support protocols are active on MAR. Replace K and Phos with 20 mmole KP04 per protocol. PN/IVF:   1. Start TPN: 10%DEX/ 4.25%AA at 85 ml/hr with 250 ml 20% Lipids daily provides 1520 kcal/d (85% of needs), 85 grams of protein/d (100% of needs), 200 grams of CHO/d (does not exceed maximum CHO load) and 2250 ml of total volume/d ( 100% of needs). 1. Additives include: 90 mEq/L NaCl, 30 mEq/L KCl, 30 mEq/L KP04, 4.5 mEq/L Ca, 8 mEq/L Mg  2. Adjust IVF once TPN starts. 3. Add TPN labs, POC Glucoses/SSI if Glucose > 180 mg/dl on AM BMP. 4. RD ordered weight as most recent weight is patient stated.      Reta German, MS, RD, LD, [phone]

## 2017-02-07 NOTE — PROGRESS NOTES
Problem: Nutrition Deficit  Goal: *Optimize nutritional status  Nutrition  Reason for assessment: Consult for TPN management per nutritional support protocols JUS Lanier  Assessment:   Diet order(s): CLQ 2/3, NPO 2/4, CLQ 2/6, Regular 2/7  Food/Nutrition History: Patient s/p paracentesis yesterday, removing 1150 cc, and revealing infected bileoma. Patient also had biliary drain placed with 550 cc out yesterday, 300 cc out so far today. Plan for HIDA scan today. Patient reports eating a whole veggie wrap brought from family prior to RD visit. Family members report that he drank broth and an ensure plus brought from home this morning. He reports that he has not eaten since Friday and is excited to be eating. Family request that TPN continue to run until patient get 100% of his appetite back. Central line access : Port. Patient refuses placement of peripheral line. TPN not able to run while Zosyn is infusing, therefore did not receive 100% of TPN today. Per PharmD, zosyn infusion time changed to q6 over 30 minutes. Pertinent Medications: Zosyn q8 240 minutes-plan to change to q6 over 30 minutes; dexamethasone, vancomycin, diflucan, florastor   Pertinent Labs: Na 142, Cl 110, C02 22, K 3.7, Phos 2.0, Mg 2.0, glucose 144, corrected calcium 9.6, triglyceride 109  Anthropometrics:Height: 6' 1\" (185.4 cm), Weight: 72.7 kg (160 lb), Body mass index is 21.09 kg/(m^2). Viola Alf  BMI class of underweight for age  Macronutrient needs:  EER: 3098-5389 kcal /day (25-30 kcal/kg listed BW)(86% IBW)  EPR:  grams protein/day (1-1.2 grams/kg IBW)(GFR >60)  Max CHO: 412 grams/day (4mg/kg ABW/min)  Fluid: 1ml/kcal  Intake/Comparative Standards: Current TPN: 10%DEX/ 4.25%AA at 85 ml/hr with 250 ml 20% Lipids daily provides 1520 kcal/d (85% of needs), 85 grams of protein/d (100% of needs), 200 grams of CHO/d (does not exceed maximum CHO load) and 2250 ml of total volume/d ( 100% of needs)      Intervention:   Meals and snacks: Regular  Supplementation: Add ensure enlive TID  Nutritional Supplement Therapy: Electrolyte replacement per nutritional support protocols are active on MAR. PN/IVF:   1. Continue TPN: 10%DEX/ 4.25%AA at 100 ml/hr with 250 ml 20% Lipids daily provides 1520 kcal/d (85% of needs), 85 grams of protein/d (100% of needs), 200 grams of CHO/d (does not exceed maximum CHO load) and 2250 ml of total volume/d ( 100% of needs). HOLD TPN for zosyn infusion (approximately 2 hours per day). TPN to run for ~20.5 hours. 1. Decrease NaCl to 50 mEq/L, Adjust total potassium to provide 50 mEq/L (Decrease KCl to 20 mEq/L)  2. RD predicts patient to be meeting 100% of needs with oral intake of meals + ensure and TPN infusion. 2. Add TPN labs, POC Glucoses/SSI if Glucose > 180 mg/dl on AM BMP.      Coordination of care: Cassy Hutchins NP, Hanh Mendez PharmD      [address], [address], [phone]

## 2017-02-07 NOTE — PROGRESS NOTES
END OF SHIFT NOTE:    Intake/Output      Voiding: yes   Catheter: no  Drain:   Alli-Deluca Drain 10/21/16 (Active)       Drain abdominal drain  02/06/17 Right; Anterior Abdomen (Active)               Stool:  Stool     Stool Assessment  Stool Color: Green;Brown (02/05/17 1530)  Stool Appearance: Loose (02/05/17 1530)  Stool Amount: Large (02/05/17 1530)  Stool Source/Status: Rectum (02/05/17 1530)    Emesis:  None          VITAL SIGNS  Patient Vitals for the past 12 hrs:   Temp Pulse Resp BP SpO2   02/06/17 1914 97.7 °F (36.5 °C) 74 18 135/78 96 %   02/06/17 1552 98 °F (36.7 °C) 74 18 154/86 99 %   02/06/17 1430 - - - 130/75 95 %   02/06/17 1425 - - - 146/79 95 %   02/06/17 1420 - - - 140/76 90 %   02/06/17 1415 - - - 143/78 96 %   02/06/17 1410 - - - 146/79 95 %   02/06/17 1405 - - - 142/78 95 %   02/06/17 1340 - 78 18 151/73 96 %   02/06/17 1335 - 79 20 151/79 96 %   02/06/17 1330 - 78 20 151/82 96 %   02/06/17 1325 - 77 18 146/75 97 %   02/06/17 1320 - 79 16 130/69 97 %   02/06/17 1315 - 80 16 133/70 98 %   02/06/17 1310 - 78 20 140/71 98 %   02/06/17 1305 - 80 18 149/74 97 %   02/06/17 1300 - 79 20 131/69 97 %   02/06/17 1255 - 74 22 135/69 98 %   02/06/17 1250 - 70 20 150/73 98 %   02/06/17 1245 - 69 18 140/68 98 %   02/06/17 1220 - 69 20 135/71 97 %   02/06/17 1115 98.4 °F (36.9 °C) 68 18 152/77 95 %       Pain Assessment  Pain 1  Pain Scale 1: Numeric (0 - 10) (02/06/17 2028)  Pain Intensity 1: 2 (02/06/17 1751)  Patient Stated Pain Goal: 0 (02/05/17 0406)  Pain Reassessment 1: Yes (02/05/17 1402)  Pain Onset 1: Approximately 3 years per patient (02/04/17 1444)  Pain Location 1: Shoulder (02/06/17 1643)  Pain Orientation 1: Right (02/06/17 1643)  Pain Description 1: Dull (02/06/17 1643)  Pain Intervention(s) 1: Medication (see MAR) (02/06/17 1643)    Ambulating  Yes to bathroom     Additional Information:  S/P Right flank  CT guide drain insertion dark green color fluid .  Medicated twice this shift for break-trough pain     Shift report given to oncoming nurse Suma Eckert RN  at the bedside.     Celine Jamison RN

## 2017-02-07 NOTE — PROGRESS NOTES
Inpatient Hematology / Oncology Progress Note      Admission Date: 2/3/2017  1:39 PM  Reason for Admission/Hospital Course: Malignant neoplasm of other parts of pancreas ([facility] [address] [C25.7]      24 Hour Events:  Infected bileoma  ID on board  NPO for HIDA scan      ROS:10 point review of systems is otherwise negative with the exception of the elements mentioned above in the HPI. Allergies   Allergen Reactions    Demerol [Meperidine] Nausea and Vomiting    Megace [Megestrol] Rash    Morphine Other (comments)     Severe sweating     Tape [Adhesive] Other (comments)     Paper tape ok  - redness on skin        OBJECTIVE:  Patient Vitals for the past 8 hrs:   BP Temp Pulse Resp SpO2   17 0814 141/77 98 °F (36.7 °C) 73 18 96 %   17 0330 138/83 97.6 °F (36.4 °C) 81 18 100 %     Temp (24hrs), Av.9 °F (36.6 °C), Min:97.6 °F (36.4 °C), Max:98.4 °F (36.9 °C)     07 -  1900  In: -   Out: 300 [Drains:300]    Physical Exam:  Constitutional: Well developedmale in no acute distress, sitting comfortably in the hospital bed. Wife and daughter at bedside. HEENT: Normocephalic and atraumatic. Oropharynx is clear, mucous membranes are moist. Extraocular muscles are intact. Sclerae anicteric. Lymph node   Deferred   Skin Warm and dry. No bruising and no rash noted. No erythema. No pallor. Respiratory Lungs are clear to auscultation bilaterally without wheezes, rales or rhonchi, normal air exchange without accessory muscle use. CVS Normal rate, regular rhythm and normal S1 and S2. No murmurs, gallops, or rubs. Abdomen Soft, mildly tender and nondistended, normoactive bowel sounds. No palpable mass. No hepatosplenomegaly. Neuro Grossly nonfocal with no obvious sensory or motor deficits. MSK Normal range of motion in general.  No edema and no tenderness. Psych Appropriate mood and affect.         Labs:    Recent Labs      17   1030  17   0455  17   2100   WBC 12.5*  15.9*   --    RBC  3.05*  3.33*   --    HGB  9.1*  9.9*  9.4*   HCT  27.7*  29.8*   --    MCV  90.8  89.5   --    MCH  29.8  29.7   --    MCHC  32.9  33.2   --    RDW  18.4*  18.3*   --    PLT  115*  136*   --       Recent Labs      17   0630  17   1030  17   0455   NA  142  141  139   K  3.7  3.3*  3.6   CL  110*  108*  106   CO2  22  23  24   AGAP  10  10  9   GLU  144*  108*  102*   BUN  18  22  32*   CREA  0.96  0.95  1.03   GFRAA  >60  >60  >60   GFRNA  >60  >60  >60   CA  8.0*  7.6*  7.8*   SGOT   --   51*  59*   AP   --   560*  515*   TP   --   5.6*  5.7*   ALB  2.0*  1.9*  1.9*  2.0*   GLOB   --   3.7*  3.7*   AGRAT   --   0.5*  0.5*   MG  2.0  1.9   --    PHOS  2.4  2.2*   --          Imagin2017  1. CT-guided paracentesis. 2. CT-guided percutaneous drainage of infected biloma/abscess at the darlin  hepatis.     Informed consent was obtained from the patient and family prior to this  procedure. They were made aware of the indications, namely need to drain the  abscess as seen on CT dated to 417, alternatives such as surgery and potential  complications including but not limited to introduction or spread of infection,  bleeding, injury, catheter occlusion, dislodgment, pain etc. The understood and  agreed to the procedure.     The patient was placed supine on the CT table and moderate intravenous conscious  sedation for one hour was initiated under my direct supervision. The patient  received intravenous Versed and fentanyl.     Noncontrast  CT images of the abdomen were obtained with a localization  grid. Skin sites were selected for paracentesis and for CT guided drainage of  the abnormal collection of air and fluid adjacent to the bile duct and duodenum. Paracentesis was necessary since there was excessive ascites.     The skin was prepped and draped using maximum sterile barrier technique.  I  personally wore sterile gown with sterile gloves as well as cap and mask.     Using CT guidance, skin site was selected, infiltrated with 2% lidocaine and to  a short dermatotomy, a paracentesis catheter with trocar was advanced into the  peritoneal cavity. Approximately 1.4 L of slightly cloudy ascites were drained. A sample was sent for cultures.     Then, a second skin site close to the first was also infiltrated with 2%  lidocaine and to a short dermatotomy, an 18-gauge Yueh centesis catheter with  dilator was advanced into the area containing fluid and air felt to represent  the abscess. A sample of bilious material which would old an infected was  withdrawn and sent for cultures. A 0.035 stiff, Amplatz guidewire was advanced  through the dilator and coiled in the cavity. The dilator was removed and 8  [place] dilator was used to dilate the tract. A 10 Azeri all-purpose drainage  catheter with stiffener was advanced over the wire into the collection,  performed, secured to the skin, and connected to external gravity drainage. At  completion of the procedure, this catheter was draining bile. The catheter was  maintained in place and the paracentesis catheter was removed. Images with the  catheter place were obtained after small amount of dilute contrast was injected. The patient tolerated procedure well and there were no complications.     FINDINGS: As demonstrated on previous CT imaging, a collection of air and fluid  having the appearance of an abscess measures 5 x 2.2 x 2.1 cm. This collection  extends up into the liver and is posterior to the duodenum. The drainage  catheters coiled in this collection and when contrast was injected through the  catheter, opacifies the collection as well as duodenum distal to this site  confirming existence of localized perforation of the duodenum.  Contrast material  did not fill the bile ducts however.     IMPRESSION  IMPRESSION: Paracentesis was performed in order to reduce fluid to permit  CT-guided percutaneous drainage of what has the appearance of an infected  biloma, communicating with the duodenum. Most likely this represents necrotic  neoplasm with a contained perforation. A drain was placed and contrast study to  the drain was performed with CT imaging. 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     Patient's family has been embolized of these findings as as Dr. Tristin Rodriguez, gastroenterologist.     PLAN: Follow-up patient clinically. Follow-up to study with contrast material in  IR in 2 weeks. ASSESSMENT:    Problem List  Date Reviewed: 2/3/2017          Codes Class Noted    Hepatic abscess ICD-10-CM: K75.0  ICD-9-CM: 572.0  2/5/2017        * (Principal)Gastrointestinal hemorrhage with hematemesis ICD-10-CM: K92.0  ICD-9-CM: 578.0  2/4/2017        GI bleed ICD-10-CM: K92.2  ICD-9-CM: 578.9  2/4/2017        Malignant neoplasm of head of pancreas ([facility] [address] ICD-10-CM: C25.0  ICD-9-CM: 157.0  2/3/2017        Right upper quadrant abdominal pain ICD-10-CM: R10.11  ICD-9-CM: 789.01  8/23/2016        Lung nodules ICD-10-CM: R91.8  ICD-9-CM: 793.19  8/14/2015        Leukocytosis ICD-10-CM: D72.829  ICD-9-CM: 288.60  7/28/2015        Lung nodule ICD-10-CM: R91.1  ICD-9-CM: 793.11  7/28/2015    Overview Signed 7/28/2015  9:48 AM by Renzo Harry NP     Impression: Stable exam except to note a new 7 mm nodular density within the  right upper lung.  This could be further evaluated with CT as an outpatient             Elevated LFTs ICD-10-CM: R79.89  ICD-9-CM: 790.6  7/28/2015        GERD (gastroesophageal reflux disease) (Chronic) ICD-10-CM: K21.9  ICD-9-CM: 530.81  3/25/2015    Overview Signed 3/25/2015  9:37 AM by Hakan Rosado     controlled with med             Anemia (Chronic) ICD-10-CM: D64.9  ICD-9-CM: 285.9  3/25/2015        Hypertension (Chronic) ICD-10-CM: I10  ICD-9-CM: 401.9  3/25/2015    Overview Signed 3/25/2015  9:38 AM by Stephanie Amezcua     controlled with med             Thrombocytopenia, unspecified ([facility] ICD-10-CM: D69.6  ICD-9-CM: 287.5  10/28/2014        Chemotherapy induced neutropenia ([facility] ICD-10-CM: D70.1, T45.1X5A  ICD-9-CM: 288.03, E933.1  9/2/2014        Hyperbilirubinemia ICD-10-CM: E80.6  ICD-9-CM: 782.4  7/24/2014        Cancer ([facility] ICD-10-CM: C80.1  ICD-9-CM: 199.1  9/16/2013        Neuropathy ICD-10-CM: G62.9  ICD-9-CM: 355.9  7/17/2013    Overview Signed 7/17/2013 11:57 AM by Katharina Mejias     7-17-13 new onset will hold therapy             Hypomagnesemia ICD-10-CM: Z68.37  ICD-9-CM: 275.2  7/31/2012                PLAN:    Per Dr. Kaitlynn Newton, there is at least 1 cm necrotic cavity under the surface of duodenal wall that has essentially obliterated the distal CBD-unable to access the biliary system despite multiple maneuvers, expect there is a communication to the duodenal bulb as some food was extracted through the papillotomy site- there was flow of bile so he is not completely obstructed. The plan is to drain the abscess and if a bilary obstruction occurs, he will need percutaneous drainage.      Stage IV pancreatic cancer (s/p long Rx of FOLFIRINOX and SBRT x2, second line gem/abraxane, third line xeloda/jakafi)  -Keytruda planned for 2/15/27. Lengthy discussion with Mr. Yuri Blanton and his daughter regarding the issues surrounding current hospitalization. CT Abdomen showed peribiliary and hepatic abscess. Cannot exclude infected large metastasis rather than just plain abscess and new pancreatic metastases and peritoneal implants as well as interval progression of pulmonary metastases. We discussed his further treatment options vs no treatment.  Mr. Yuri Blanton was encouraged to have conversations with his wife and family regarding end of life wishes.      Malnutrition  -consult nutritionist for short term TPN  -02/07 NPO for HIDA scan/ can resume regular diet following scan     Hepatic abscess  -continue Zosyn, consult ID  -02/07 on Zosyn and vanc per ID, Infected bileoma per GI, follow fluid cultures, HIDA scan. Anemia  -02/07 transfuse 1 unit PRBC     Plan: We will discuss with ID for recommendations for long term antibiotics once discharged home. Patient and family continue to discuss treatment with Rina Stallworth. We will follow along daily with GI. Nickey Duane, FRANKLYN   [facility]  [address] [address]  Office : [phone]  Fax : [phone]   Patient seen, examed and discussed with NP, agree with above history/assessment/plan. 80 y.o.male stage IV pancreatic cancer s/p multiple lines of therapies, developed leukocytosis and chills on 2/3/17 and received biliary stent eval and complicated with massive bleeding and admitted. I have had extensive discussion with Dr. Sharath Garcia, and discussed with pt and family regarding the eminent issues and [address]. We agreed to first aim at managing the liver abscess with IR drain, the necrotic cavity adjacent to duodenum with observation, consult ID for the abscess and colitis rx and prefer to have an outpt regimen to be given at home. We need to determine how the duodenum/CBD cavity affect his diet, for now start TPN transiently. We also discussed incurable cancer and further options of comfort only vs Keytruda, he desires to prolong life if possible, and [place] had good trial result as a novel immunotherapy in his setting and it should have low risk interrupting with above rx, tentatively to arrange for next week. He had external drain placed by Dr. Garza Person, culture pending, HIDA scan to help illustrate the biloma and cavity structure, diarrhea resolved as likely response to abx, again discussed with pt and family.  Renetta Calvillo M.D.   [address]  Office : [phone]  Fax : [phone]

## 2017-02-07 NOTE — PROGRESS NOTES
Care Management Interventions  PCP Verified by CM: Yes  Palliative Care Consult (Criteria: CHF and RRAT>21): Yes  Mode of Transport at Discharge: Other (see comment)  Transition of Care Consult (CM Consult): Other  MyChart Signup: No  Discharge Durable Medical Equipment: No  Health Maintenance Reviewed: Yes  Physical Therapy Consult: No  Occupational Therapy Consult: No  Speech Therapy Consult: No  Current Support Network: Lives with Spouse, Own Home  Confirm Follow Up Transport: Family  Plan discussed with Pt/Family/Caregiver: Yes  Freedom of Choice Offered: Yes  Discharge Location  Discharge Placement: Home     Will follow for dc needs.

## 2017-02-07 NOTE — PROGRESS NOTES
Sterile normal saline 10 cc instilled into abdominal biliary drain tube using aseptic technique. Patient tolearnt procedure well.

## 2017-02-07 NOTE — PROGRESS NOTES
Infectious Disease Progress note    Today's Date: 2017   Admit Date: 2/3/2017    Impression:   · Peribiliary/ R hepatic lobe abscess (CT ): s/p () CT drainage: cx yeast/GPCs pending  · Stage 4 metastatic pancreatic cancer (mets to liver, lungs) ongoing cholangitis: s/p 2/3 ERCP with stent removal and discovery of abscess with bile drainage via papillotomy site  · Leukocytosis    Plan:   · Continue Zosyn and Vancomycin  · Start Fluconazole  · 3-4 weeks. PO should be ok given pt was minimally symptomatic  · Follow cultures from drainage     Anti-infectives:   Zosyn (2/3-  Vancomycin (-  Fluconazole (-  Cipro pre-admission  Subjective:   Seen with wife and Dr Bonita Fletcher at bedside. ID plan discussed. Pt report no fever, chills, sweats or diarrhea. Mild pain. Allergies   Allergen Reactions    Demerol [Meperidine] Nausea and Vomiting    Megace [Megestrol] Rash    Morphine Other (comments)     Severe sweating     Tape [Adhesive] Other (comments)     Paper tape ok  - redness on skin         Review of Systems:  A comprehensive review of systems was negative except for that written in the History of Present Illness. Objective:     Visit Vitals    /77 (BP 1 Location: Left arm, BP Patient Position: At rest)    Pulse 73    Temp 98 °F (36.7 °C)    Resp 18    Ht 6' 1\" (1.854 m)    Wt 72.8 kg (160 lb 6.4 oz)    SpO2 96%    BMI 21.16 kg/m2     Temp (24hrs), Av.9 °F (36.6 °C), Min:97.6 °F (36.4 °C), Max:98.4 °F (36.9 °C)       Lines:  Peripheral IV:  RUE intact          Physical Exam:    General:  Alert, cooperative, well noursished, well developed, appears stated age   Eyes:  Sclera anicteric. Pupils equally round and reactive to light. Mouth/Throat: Mucous membranes normal, oral pharynx clear   Neck: Supple   Lungs:   Clear to auscultation bilaterally, good effort   CV:  Regular rate and rhythm,no murmur, click, rub or gallop   Abdomen:   Soft, non-tender.  bowel sounds normal. non-distended, RLQ drain: bilious output   Extremities: No cyanosis or edema   Skin: Skin color, texture, turgor normal. no acute rash or lesions   Lymph nodes: Cervical and supraclavicular normal   Musculoskeletal: No swelling or deformity   Lines/Devices:  Intact, no erythema, drainage or tenderness   Psych: Alert and oriented, normal mood affect given the setting       Data Review:     CBC:  Recent Labs      02/06/17   1030  02/05/17   0455  02/04/17   2100   WBC  12.5*  15.9*   --    HGB  9.1*  9.9*  9.4*   HCT  27.7*  29.8*   --    PLT  115*  136*   --        BMP:  Recent Labs      02/07/17   0630  02/06/17   1030  02/05/17 0455   CREA  0.96  0.95  1.03   BUN  18  22  32*   NA  142  141  139   K  3.7  3.3*  3.6   CL  110*  108*  106   CO2  22  23  24   AGAP  10  10  9   GLU  144*  108*  102*       LFTS:  Recent Labs      02/07/17 0630 02/06/17 1030  02/05/17 0455   TBILI   --   1.1  1.1   ALT   --   42  37   SGOT   --   51*  59*   AP   --   560*  515*   TP   --   5.6*  5.7*   ALB  2.0*  1.9*  1.9*  2.0*       Microbiology:     All Micro Results     Procedure Component Value Units Date/Time    CULTURE, BODY FLUID Tasia Dayhoff STAIN [[id number]] Collected:  02/06/17 1325    Order Status:  Completed Specimen:   Body Fluid Updated:  02/07/17 0825     Special Requests: LIVER ASCITES FLUID      GRAM STAIN NO DEFINITE ORGANISM SEEN         0 TO 5  WBCS SEEN  PER OIF        Culture result: NO GROWTH 1 DAY       CULTURE, BODY FLUID Tasia Dayhoff STAIN [[id number]] Collected:  02/06/17 1325    Order Status:  Completed Specimen:  Abscess Updated:  02/07/17 0824     Special Requests: LIVER        GRAM STAIN RARE  GRAM POSITIVE COCCI         MODERATE  GRAM POSITIVE RODS         MODERATE  YEAST         0 TO 1  WBCS SEEN  PER OIF        Culture result: MODERATE  YEAST  SUBCULTURE IN PROGRESS       C. DIFFICILE/EPI PCR [[id number]] Collected:  02/05/17 2044    Order Status:  Completed Specimen:  Stool Updated:  02/05/17 2211 Special Requests: NO SPECIAL REQUESTS        Culture result: NEGATIVE                  Toxigenic C. diff NEGATIVE/ [id number]-[id number]-[id number] PRESUMPTIVE NEGATIVE    MRSA SCREEN - PCR (NASAL) [[id number]] Collected:  17 2140    Order Status:  Completed Specimen:  Nasal from Nares Updated:  17 0121     Special Requests: NO SPECIAL REQUESTS        Culture result:         MRSA target DNA is detected (presumptive positive for MRSA colonization). RESULTS VERIFIED, PHONED TO AND READ BACK BY  STACY CONWAY RN AT 2151 ON [id number] BY MPJ             Imagin/4/17 CT abd w/ contrast  IMPRESSION:  1. Peribiliary and hepatic abscess. Cannot exclude infected large metastasis rather than just plain abscess. 2. Intrahepatic biliary distention. 3. New pancreatic metastases and peritoneal implants. Interval progression of pulmonary metastases. 3. Probable pseudomembranous colitis. 4. Borderline splenomegaly probably indicate some underlying portal hypertension.     Signed By: Connor Hurtado NP     2017

## 2017-02-07 NOTE — PROGRESS NOTES
GI DAILY PROGRESS NOTE    Admit Date:  2/3/2017    Today's Date:  2/7/2017    CC:  Bleeding S/P ERCP, Pancreatic Ca    Subjective:     Patient reports continued shoulder pain, denies ab pain, N/V. Small stool today w/o bleeding. C Diff negative 2/5/17. Biliary drain placed yesterday. HIDA pending today.       Medications:   Current Facility-Administered Medications   Medication Dose Route Frequency    fluconazole (DIFLUCAN) tablet 400 mg  400 mg Oral DAILY    0.9% sodium chloride infusion 250 mL  250 mL IntraVENous PRN    loperamide (IMODIUM) capsule 2 mg  2 mg Oral Q4H PRN    Saccharomyces boulardii (FLORASTOR) capsule 500 mg  500 mg Oral BID    NUTRITIONAL SUPPORT ELECTROLYTE PRN ORDERS   Does Not Apply PRN    fat emulsion 20% (LIPOSYN, INTRALIPID) infusion 250 mL  250 mL IntraVENous QPM    TPN ADULT - dextrose 10% amino acid 4.25%   IntraVENous QPM    vancomycin (VANCOCIN) 1,000 mg in 0.9% sodium chloride (MBP/ADV) 250 mL  1,000 mg IntraVENous Q12H    influenza vaccine 2016-17 (36mos+)(PF) (FLUZONE/FLUARIX/FLULAVAL QUAD) injection 0.5 mL  0.5 mL IntraMUSCular PRIOR TO DISCHARGE    0.9% sodium chloride infusion 250 mL  250 mL IntraVENous PRN    mupirocin (BACTROBAN) 2 % ointment   Topical BID    piperacillin-tazobactam (ZOSYN) 3.375 g in 0.9% sodium chloride (MBP/ADV) 100 mL  3.375 g IntraVENous Q8H    diphenhydrAMINE (BENADRYL) capsule 25 mg  25 mg Oral Q6H PRN    dexamethasone (DECADRON) tablet 1 mg  1 mg Oral DAILY    HYDROcodone-acetaminophen (NORCO)  mg tablet 1 Tab  1 Tab Oral Q6H PRN    zolpidem (AMBIEN) tablet 5 mg  5 mg Oral QHS PRN    sodium chloride (NS) flush 5-10 mL  5-10 mL IntraVENous Q8H    sodium chloride (NS) flush 5-10 mL  5-10 mL IntraVENous PRN    HYDROmorphone (PF) (DILAUDID) injection 1 mg  1 mg IntraVENous Q4H PRN    sucralfate (CARAFATE) tablet 1 g  1 g Oral AC&HS    ondansetron (ZOFRAN) injection 4 mg  4 mg IntraVENous Q4H PRN    pantoprazole (PROTONIX) 40 mg in sodium chloride 0.9 % 10 mL injection  40 mg IntraVENous Q24H       Review of Systems:  ROS was obtained, with pertinent positives as listed above. No chest pain or SOB. Diet:  NPO    Objective:   Vitals:  Visit Vitals    /77 (BP 1 Location: Left arm, BP Patient Position: At rest)    Pulse 73    Temp 98 °F (36.7 °C)    Resp 18    Ht 6' 1\" (1.854 m)    Wt 72.8 kg (160 lb 6.4 oz)    SpO2 96%    BMI 21.16 kg/m2     Intake/Output:  02/07 0701 - 02/07 1900  In: -   Out: 300 [Drains:300]  02/05 1901 - 02/07 0700  In: 1855 [I.V.:1493]  Out: 6815 [Urine:475; Drains:550]  Exam:  General appearance: alert, cooperative, no distress  Lungs: clear to auscultation bilaterally anteriorly  Heart: regular rate and rhythm  Abdomen: soft, mildly ttp. Drain w bilious material present.  Bowel sounds normal. No masses, no organomegaly   Extremities: extremities normal, atraumatic, no cyanosis or edema  Neuro:  alert and oriented    Data Review (Labs):    Recent Labs      02/07/17   0630  02/06/17   1030  02/05/17   0455  02/04/17   2100   WBC   --   12.5*  15.9*   --    HGB   --   9.1*  9.9*  9.4*   HCT   --   27.7*  29.8*   --    PLT   --   115*  136*   --    MCV   --   90.8  89.5   --    NA  142  141  139   --    K  3.7  3.3*  3.6   --    CL  110*  108*  106   --    CO2  22  23  24   --    BUN  18  22  32*   --    CREA  0.96  0.95  1.03   --    CA  8.0*  7.6*  7.8*   --    MG  2.0  1.9   --    --    GLU  144*  108*  102*   --    AP   --   560*  515*   --    SGOT   --   51*  59*   --    ALT   --   42  37   --    TBILI   --   1.1  1.1   --    ALB  2.0*  1.9*  1.9*  2.0*   --    TP   --   5.6*  5.7*   --    PTP   --    --   12.2*   --    INR   --    --   1.1   --        ERCP 2/3/17 Dr Jose Light  ASSESSMENT:  Invasive pancreatic cancer with duodenal bleed  Stent removed but unable to replace  Massive arterial bleed from the bed of malignant invasion into the duodenal bulb- injected with epi and eventually clotted    CT ABD/P 2/4/17  1. Peribiliary and hepatic abscess. Cannot exclude infected large metastasis  rather than just plain abscess. 2. Intrahepatic biliary distention. 3. New pancreatic metastases and peritoneal implants. Interval progression of  pulmonary metastases. 3. Probable pseudomembranous colitis. 4. Borderline splenomegaly probably indicate some underlying portal  hypertension. ERCP 2/5/17 Dr Irl Schwab  ASSESSMENT:  1. At least 1 cm necrotic cavity under the surface of duodenal wall that has essentially obliterated the distal CBD-unable to access the biliary system despite multiple maneuvers, expect there is a communication to the duodenal bulb as some food was extracted through the papillotomy site- there was flow of bile so he is not completely obstructed   2. Pancreas not evaluated   3. 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 Abscess drainage as planned  2. If biliary obstruction occurs, he will need percutaneous drainage- he currently is still draining bile through the papillotomy site     Date of Procedure: 2/6/2017  Paracentesis, with CT guidance, Ct drainage with 10 Fr APD placed into abscess. Pre-Procedure Diagnosis: hepatic/peribiliary abscess; pancreatic CA with mets and biliary obstruction. Post-Procedure Diagnosis: infected bileoma, contained perforation of duodenum or CBD. ? Tumor infiltration with necrotic perforation. Brief Description of Procedure: Ct , needed paracentesis prior to abscess drainage.          Assessment:     Principal Problem:    Gastrointestinal hemorrhage with hematemesis (2/4/2017)    Active Problems:    GI bleed (2/4/2017)      Hepatic abscess (2/5/2017)      79 yo patient w pancreatic cancer admitted w bleeding after ERCP 2/3/17. Stable s/p transfusion 2/3/17. CT 2/4 w hepatic abscess - IR plans drain today. On Zosyn. C/O diarrhea beginning 2/5, C Diff negative.   ERCP 2/5 w deep malignant ulcer in duodenal bulb w/o bleed. 1 cm necrotic cavity under surface of duodenal wall obliterating distal CBD could not access. Hepatic abscess/infected bilemona on Zoysn per ID. Drain placed by IR yesterday. HIDA today. Oncology is discussing treatment options with patient and family. Plan:     1) Continue Antibiotics  2) Oncology is discussing treatment options w pt and family  3) HIDA pending    Denny Mario NP  Patient is seen and examined in collaboration with Dr. Steven Potter. Assessment and plan as per Dr. Clara Espinal. ATTENDING NOTE:  I have seen and examined the patient along with my NP/PA. The assessment and plan above is my own. I have had a long discussion with the wife and patient about his terminal irreversible condition and category III status was agreed upon. He is hopeful of immunoRx response. My concern is for a perforated duodenal bulb with extravasation. Currently he is tolerating food and wants to eat. Dr. Carloz Egan states that this is OK. He is not a candidate for stenting as the access to the cbd is completely obliterated but is draining and a HIDA is planned. Will hold carafate and probiotic s.boulardii as there is communication with the abscess and these may be contraindicated.     Constantin Westfall MD

## 2017-02-07 NOTE — PROGRESS NOTES
END OF SHIFT NOTE:    Intake/Output      Voiding: YES  Catheter: NO  Drain:   Alli-Deluca Drain 10/21/16 (Active)       Drain abdominal drain  02/06/17 Right; Anterior Abdomen (Active)   Site Assessment Clean, dry, & intact 2/6/2017  8:02 PM   Dressing Status Clean, dry, & intact 2/6/2017  8:02 PM   Status Draining;Pouched 2/6/2017  8:02 PM   Drainage Description Green 2/6/2017  8:02 PM   Output (ml) 300 ml 2/7/2017  6:32 AM               Stool:  0 occurrences. Stool Assessment  Stool Color: Green;Brown (02/05/17 1530)  Stool Appearance: Loose (02/05/17 1530)  Stool Amount: Large (02/05/17 1530)  Stool Source/Status: Rectum (02/05/17 1530)    Emesis:  0 occurrences. VITAL SIGNS  Patient Vitals for the past 12 hrs:   Temp Pulse Resp BP SpO2   02/07/17 0330 97.6 °F (36.4 °C) 81 18 138/83 100 %   02/06/17 2123 - - - - 98 %   02/06/17 1914 97.7 °F (36.5 °C) 74 18 135/78 96 %       Pain Assessment  Pain 1  Pain Scale 1: Numeric (0 - 10) (02/06/17 2002)  Pain Intensity 1: 0 (02/06/17 2002)  Patient Stated Pain Goal: 0 (02/05/17 0406)  Pain Reassessment 1: Yes (02/05/17 1402)  Pain Onset 1: Approximately 3 years per patient (02/04/17 1444)  Pain Location 1: Shoulder (02/06/17 1643)  Pain Orientation 1: Right (02/06/17 1643)  Pain Description 1: Dull (02/06/17 1643)  Pain Intervention(s) 1: Medication (see MAR) (02/06/17 1643)    Ambulating  YES    Additional Information: no overnight events. Shift report given to oncoming nurse at the bedside.     Nga Rodriguez, RN

## 2017-02-08 NOTE — PROGRESS NOTES
Problem: Nutrition Deficit  Goal: *Optimize nutritional status  Per NP note, TPN to d/c following current bag's infusion. RD to f/u per protocol.      Too Ontiveros [address], [address] [place] [address], [phone]

## 2017-02-08 NOTE — PROGRESS NOTES
Pharmacokinetic Consult to Pharmacist    Anuradha Cary is a 80 y.o. male being treated for Hepatic Abscess with Vancomycin and Zosyn. Height: 6' 1\" (185.4 cm)  Weight: 74.4 kg (164 lb)  Lab Results   Component Value Date/Time    BUN 18 02/08/2017 04:00 AM    Creatinine 0.74 02/08/2017 04:00 AM    WBC 8.3 02/08/2017 04:00 AM    Procalcitonin 1.2 07/16/2016 04:40 PM      Estimated Creatinine Clearance: 82.4 mL/min (based on Cr of 0.74). CULTURES:  2/6   Body fluid/liver culture  Moderate yeast and GPC, pending    Lab Results   Component Value Date/Time    Vancomycin,trough 11.6 02/08/2017 09:06 AM       Day 3 of vancomycin. Goal trough is 15-20. Vancomycin trough resulted low at 11.6, so will adjust dosing to 750 mg Q8H. Will plan to check vancomycin trough level prior to the 4th dose of the new regimen. Will continue to follow culture from abscess for speciation and sensitivity pattern. Will continue to follow patient.       Thank you,  Byron Lopez, PharmD  Clinical Pharmacist  [phone]

## 2017-02-08 NOTE — PROGRESS NOTES
Infectious Disease Progress note    Today's Date: 2017   Admit Date: 2/3/2017    Impression:   · Peribiliary/ R hepatic lobe abscess (CT ): s/p () CT drainage: cx yeast/GPCs pending  · Stage 4 metastatic pancreatic cancer (mets to liver, lungs) ongoing cholangitis: s/p 2/3 ERCP with stent removal and discovery of abscess with bile drainage via papillotomy site  · Leukocytosis-resolved    Plan:   · Continue Zosyn/Vancomycin/Fluconazole for now. · Anticipate transition to oral abx at MI depending organisms identification. · Follow cultures from drainage-GPC and yeast still pending speciation (confirmed with Micro today)  · Ok for probiotic    Anti-infectives:   Zosyn (2/3-  Vancomycin (-  Fluconazole (-  Cipro pre-admission  Subjective:   Family in room. + loose stools. Denies fever or chills. Allergies   Allergen Reactions    Demerol [Meperidine] Nausea and Vomiting    Megace [Megestrol] Rash    Morphine Other (comments)     Severe sweating     Tape [Adhesive] Other (comments)     Paper tape ok  - redness on skin         Review of Systems:  A comprehensive review of systems was negative except for that written in the History of Present Illness. Objective:     Visit Vitals    /87 (BP 1 Location: Left arm, BP Patient Position: Supine)    Pulse 62    Temp 98.7 °F (37.1 °C)    Resp 18    Ht 6' 1\" (1.854 m)    Wt 74.4 kg (164 lb)    SpO2 96%    BMI 21.64 kg/m2     Temp (24hrs), Av.4 °F (36.9 °C), Min:98 °F (36.7 °C), Max:98.8 °F (37.1 °C)       Lines:  Peripheral IV:  RUE intact          Physical Exam:    General:  Alert, cooperative, well noursished, well developed, appears stated age   Eyes:  Sclera anicteric. Pupils equally round and reactive to light.    Mouth/Throat: Mucous membranes normal, oral pharynx clear   Neck: Supple   Lungs:   Clear to auscultation bilaterally, good effort   CV:  Regular rate and rhythm,no murmur, click, rub or gallop   Abdomen:   Soft, non-tender. bowel sounds normal. non-distended, RLQ drain: bilious output   Extremities: No cyanosis or edema   Skin: Skin color, texture, turgor normal. no acute rash or lesions   Lymph nodes: Cervical and supraclavicular normal   Musculoskeletal: No swelling or deformity   Lines/Devices:  Intact, no erythema, drainage or tenderness   Psych: Alert and oriented, normal mood affect given the setting       Data Review:     CBC:  Recent Labs      02/08/17   0400  02/07/17   0745  02/06/17   1030   WBC  8.3  12.0*  12.5*   GRANS  83*  86*   --    MONOS  8  2*   --    EOS  1  1   --    ANEU  7.0  10.5*   --    ABL  0.6  1.2   --    HGB  8.6*  8.7*  9.1*   HCT  26.4*  26.3*  27.7*   PLT  106*  116*  115*       BMP:  Recent Labs      02/08/17   0400  02/07/17   0630  02/06/17   1030   CREA  0.74*  0.96  0.95   BUN  18  18  22   NA  144  142  141   K  3.5  3.7  3.3*   CL  111*  110*  108*   CO2  22  22  23   AGAP  11  10  10   GLU  102*  144*  108*       LFTS:  Recent Labs      02/07/17   0630  02/06/17   1030   TBILI  1.2*  1.1   ALT  39  42   SGOT  44*  51*   AP  568*  560*   TP  6.2*  5.6*   ALB  2.1*  2.0*  1.9*  1.9*       Microbiology:     All Micro Results     Procedure Component Value Units Date/Time    CULTURE, BODY FLUID Pennie Colin STAIN [[id number]]  (Abnormal) Collected:  02/06/17 1325    Order Status:  Completed Specimen:  Abscess Updated:  02/08/17 0824     Special Requests: LIVER        GRAM STAIN RARE  GRAM POSITIVE COCCI         MODERATE  GRAM POSITIVE RODS         MODERATE  YEAST         0 TO 1  WBCS SEEN  PER OIF        Culture result: MODERATE  YEAST  IDENTIFICATION TO FOLLOW   (A)       LIGHT  GRAM POSITIVE COCCI  IDENTIFICATION AND SUSCEPTIBILITY TO FOLLOW   (A)       LIGHT  NORMAL SKIN CHRIS ISOLATED       CULTURE, BODY FLUID ALFRED Sandra [[id number]] Collected:  02/06/17 1325    Order Status:  Completed Specimen:   Body Fluid Updated:  02/08/17 0820     Special Requests: LIVER ASCITES FLUID      GRAM STAIN NO DEFINITE ORGANISM SEEN         0 TO 5  WBCS SEEN  PER OIF        Culture result: NO GROWTH 2 DAYS       C. DIFFICILE/EPI PCR [[id number]] Collected:  17 2044    Order Status:  Completed Specimen:  Stool Updated:  17 221     Special Requests: NO SPECIAL REQUESTS        Culture result: NEGATIVE                  Toxigenic C. diff NEGATIVE/ 027-NAP1-BI PRESUMPTIVE NEGATIVE    MRSA SCREEN - PCR (NASAL) [[id number]] Collected:  17 2140    Order Status:  Completed Specimen:  Nasal from Nares Updated:  17 0121     Special Requests: NO SPECIAL REQUESTS        Culture result:         MRSA target DNA is detected (presumptive positive for MRSA colonization). RESULTS VERIFIED, PHONED TO AND READ BACK BY  STACY CONWAY RN AT 0242 ON [id number] BY MPJ             Imagin/4/17 CT abd w/ contrast  IMPRESSION:  1. Peribiliary and hepatic abscess. Cannot exclude infected large metastasis rather than just plain abscess. 2. Intrahepatic biliary distention. 3. New pancreatic metastases and peritoneal implants. Interval progression of pulmonary metastases. 3. Probable pseudomembranous colitis. 4. Borderline splenomegaly probably indicate some underlying portal hypertension.     Signed By: Anjelica Case NP     2017

## 2017-02-08 NOTE — PROGRESS NOTES
Inpatient Hematology / Oncology Progress Note      Admission Date: 2/3/2017  1:39 PM  Reason for Admission/Hospital Course: Malignant neoplasm of other parts of pancreas ([facility] [address] [C25.7]      24 Hour Events:  Feeling better  Anxious to go home  Family at bedside      ROS:10 point review of systems is otherwise negative with the exception of the elements mentioned above in the HPI. Allergies   Allergen Reactions    Demerol [Meperidine] Nausea and Vomiting    Megace [Megestrol] Rash    Morphine Other (comments)     Severe sweating     Tape [Adhesive] Other (comments)     Paper tape ok  - redness on skin        OBJECTIVE:  Patient Vitals for the past 8 hrs:   BP Temp Pulse Resp SpO2   17 1144 148/87 98.7 °F (37.1 °C) 62 18 96 %   17 0818 152/86 98.8 °F (37.1 °C) 64 18 98 %   17 0638 160/80 98 °F (36.7 °C) 67 18 98 %   17 0526 155/86 98.1 °F (36.7 °C) 78 18 99 %   17 0445 170/78 98.7 °F (37.1 °C) 63 18 97 %     Temp (24hrs), Av.4 °F (36.9 °C), Min:98 °F (36.7 °C), Max:98.8 °F (37.1 °C)         Physical Exam:  Constitutional: Well developedmale in no acute distress, sitting comfortably in the hospital bed. Wife and daughter at bedside. HEENT: Normocephalic and atraumatic. Oropharynx is clear, mucous membranes are moist. Extraocular muscles are intact. Sclerae anicteric. Lymph node   Deferred   Skin Warm and dry. No bruising and no rash noted. No erythema. No pallor. Respiratory Lungs are clear to auscultation bilaterally without wheezes, rales or rhonchi, normal air exchange without accessory muscle use. CVS Normal rate, regular rhythm and normal S1 and S2. No murmurs, gallops, or rubs. Abdomen Soft, non tender and nondistended, normoactive bowel sounds. No palpable mass. No hepatosplenomegaly. Biliary drain in place. Neuro Grossly nonfocal with no obvious sensory or motor deficits. MSK Normal range of motion in general.  No edema and no tenderness. Psych Appropriate mood and affect. Labs:      Recent Labs      17   0400  17   0745  17   1030   WBC  8.3  12.0*  12.5*   RBC  2.87*  2.85*  3.05*   HGB  8.6*  8.7*  9.1*   HCT  26.4*  26.3*  27.7*   MCV  92.0  92.3  90.8   MCH  30.0  30.5  29.8   MCHC  32.6  33.1  32.9   RDW  18.3*  18.2*  18.4*   PLT  106*  116*  115*   GRANS  83*  86*   --    LYMPH  7*  10*   --    MONOS  8  2*   --    EOS  1  1   --    BASOS  0  0   --    IG  1.1  0.8   --    DF  AUTOMATED  AUTOMATED   --    ANEU  7.0  10.5*   --    ABL  0.6  1.2   --    ABM  0.7  0.2   --    [place]  0.0  0.1   --    ABB  0.0  0.0   --    AIG  0.1  0.1   --         Recent Labs      17   0400  17   0630  17   1030   NA  144  142  141   K  3.5  3.7  3.3*   CL  111*  110*  108*   CO2  22  22  23   AGAP  11  10  10   GLU  102*  144*  108*   BUN  18  18  22   CREA  0.74*  0.96  0.95   GFRAA  >60  >60  >60   GFRNA  >60  >60  >60   CA  7.8*  8.0*  7.6*   SGOT   --   44*  51*   AP   --   568*  560*   TP   --   6.2*  5.6*   ALB   --   2.1*  2.0*  1.9*  1.9*   GLOB   --   4.1*  3.7*   AGRAT   --   0.5*  0.5*   MG  2.0  2.0  1.9   PHOS  2.0*  2.4  2.2*         Imagin2017  1. CT-guided paracentesis. 2. CT-guided percutaneous drainage of infected biloma/abscess at the darlin  hepatis.     Informed consent was obtained from the patient and family prior to this  procedure. They were made aware of the indications, namely need to drain the  abscess as seen on CT dated to 417, alternatives such as surgery and potential  complications including but not limited to introduction or spread of infection,  bleeding, injury, catheter occlusion, dislodgment, pain etc. The understood and  agreed to the procedure.     The patient was placed supine on the CT table and moderate intravenous conscious  sedation for one hour was initiated under my direct supervision.  The patient  received intravenous Versed and fentanyl.     Noncontrast  CT images of the abdomen were obtained with a localization  grid. Skin sites were selected for paracentesis and for CT guided drainage of  the abnormal collection of air and fluid adjacent to the bile duct and duodenum. Paracentesis was necessary since there was excessive ascites.     The skin was prepped and draped using maximum sterile barrier technique. I  personally wore sterile gown with sterile gloves as well as cap and mask.     Using CT guidance, skin site was selected, infiltrated with 2% lidocaine and to  a short dermatotomy, a paracentesis catheter with trocar was advanced into the  peritoneal cavity. Approximately 1.4 L of slightly cloudy ascites were drained. A sample was sent for cultures.     Then, a second skin site close to the first was also infiltrated with 2%  lidocaine and to a short dermatotomy, an 18-gauge WeBRAND catheter with  dilator was advanced into the area containing fluid and air felt to represent  the abscess. A sample of bilious material which would old an infected was  withdrawn and sent for cultures. A 0.035 stiff, Amplatz guidewire was advanced  through the dilator and coiled in the cavity. The dilator was removed and 8  Western Allison dilator was used to dilate the tract. A 10 Wolof all-purpose drainage  catheter with stiffener was advanced over the wire into the collection,  performed, secured to the skin, and connected to external gravity drainage. At  completion of the procedure, this catheter was draining bile. The catheter was  maintained in place and the paracentesis catheter was removed. Images with the  catheter place were obtained after small amount of dilute contrast was injected. The patient tolerated procedure well and there were no complications.     FINDINGS: As demonstrated on previous CT imaging, a collection of air and fluid  having the appearance of an abscess measures 5 x 2.2 x 2.1 cm.  This collection  extends up into the liver and is posterior to the duodenum. The drainage  catheters coiled in this collection and when contrast was injected through the  catheter, opacifies the collection as well as duodenum distal to this site  confirming existence of localized perforation of the duodenum. Contrast material  did not fill the bile ducts however.     IMPRESSION  IMPRESSION: Paracentesis was performed in order to reduce fluid to permit  CT-guided percutaneous drainage of what has the appearance of an infected  biloma, communicating with the duodenum. Most likely this represents necrotic  neoplasm with a contained perforation. A drain was placed and contrast study to  the drain was performed with CT imaging. 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     Patient's family has been embolized of these findings as as Dr. Gordo Mittal, gastroenterologist.     PLAN: Follow-up patient clinically. Follow-up to study with contrast material in  IR in 2 weeks. NUCLEAR MEDICINE HEPATOBILIARY SCAN, 2/7/2017.     CLINICAL HISTORY: Recent ERCP. Bleeding after stent removal.     Technique: Sequential planar images of the abdomen were obtained following the  uneventful intravenous administration of 6.3 mCi Tc 99m-Choletec.     FINDINGS:     Following the administration of radiotracer, there is abnormal retention of  radiotracer activity within the liver. Some delayed cardiac blood pool activity  is also felt to be seen. Imaging was performed through 95 minutes without  biliary activity. Additional imaging of the drainage bag show no activity within  the bag. These findings are concerning for high-grade biliary obstruction.     IMPRESSION  IMPRESSION:  1.  Nonvisualization of biliary activity at 95 minutes with abnormal retention of  activity in the liver and some delayed cardiac blood pool activity concerning  for high-grade biliary obstruction. ASSESSMENT:    Problem List  Date Reviewed: 2/3/2017          Codes Class Noted    Hepatic abscess ICD-10-CM: K75.0  ICD-9-CM: 572.0  2/5/2017        * (Principal)Gastrointestinal hemorrhage with hematemesis ICD-10-CM: K92.0  ICD-9-CM: 578.0  2/4/2017        GI bleed ICD-10-CM: K92.2  ICD-9-CM: 578.9  2/4/2017        Malignant neoplasm of head of pancreas ([facility] ICD-10-CM: C25.0  ICD-9-CM: 157.0  2/3/2017        Right upper quadrant abdominal pain ICD-10-CM: R10.11  ICD-9-CM: 789.01  8/23/2016        Lung nodules ICD-10-CM: R91.8  ICD-9-CM: 793.19  8/14/2015        Leukocytosis ICD-10-CM: D72.829  ICD-9-CM: 288.60  7/28/2015        Lung nodule ICD-10-CM: R91.1  ICD-9-CM: 793.11  7/28/2015    Overview Signed 7/28/2015  9:48 AM by Jovani Cheek NP     Impression: Stable exam except to note a new 7 mm nodular density within the  right upper lung.  This could be further evaluated with CT as an outpatient             Elevated LFTs ICD-10-CM: R79.89  ICD-9-CM: 790.6  7/28/2015        GERD (gastroesophageal reflux disease) (Chronic) ICD-10-CM: K21.9  ICD-9-CM: 530.81  3/25/2015    Overview Signed 3/25/2015  9:37 AM by Tyrel Ogden     controlled with med             Anemia (Chronic) ICD-10-CM: D64.9  ICD-9-CM: 285.9  3/25/2015        Hypertension (Chronic) ICD-10-CM: I10  ICD-9-CM: 401.9  3/25/2015    Overview Signed 3/25/2015  9:38 AM by Tyrel Ogden     controlled with med             Thrombocytopenia, unspecified ([facility] ICD-10-CM: D69.6  ICD-9-CM: 287.5  10/28/2014        Chemotherapy induced neutropenia ([facility] ICD-10-CM: D70.1, T45.1X5A  ICD-9-CM: 288.03, E933.1  9/2/2014        Hyperbilirubinemia ICD-10-CM: E80.6  ICD-9-CM: 782.4  7/24/2014        Cancer ([facility] ICD-10-CM: C80.1  ICD-9-CM: 199.1  9/16/2013        Neuropathy ICD-10-CM: G62.9  ICD-9-CM: 355.9  7/17/2013    Overview Signed 7/17/2013 11:57 AM by Chun Trinh     7-17-13 new onset will hold therapy             Hypomagnesemia ICD-10-CM: M30.77  ICD-9-CM: 275.2  7/31/2012                PLAN:    Per Dr. Antoine Thompson, there is at least 1 cm necrotic cavity under the surface of duodenal wall that has essentially obliterated the distal CBD-unable to access the biliary system despite multiple maneuvers, expect there is a communication to the duodenal bulb as some food was extracted through the papillotomy site- there was flow of bile so he is not completely obstructed. The plan is to drain the abscess and if a bilary obstruction occurs, he will need percutaneous drainage.      Stage IV pancreatic cancer (s/p long Rx of FOLFIRINOX and SBRT x2, second line gem/abraxane, third line xeloda/jakafi)  -Keytruda planned for 2/15/27. Lengthy discussion with Mr. Lani Urena and his daughter regarding the issues surrounding current hospitalization. CT Abdomen showed peribiliary and hepatic abscess. Cannot exclude infected large metastasis rather than just plain abscess and new pancreatic metastases and peritoneal implants as well as interval progression of pulmonary metastases. We discussed his further treatment options vs no treatment. Mr. Lani Urena was encouraged to have conversations with his wife and family regarding end of life wishes.      Malnutrition  -consult nutritionist for short term TPN  -02/07 NPO for HIDA scan/ can resume regular diet following scan  -02/08 Resume regular diet. DC TPN once current bag completes.      Hepatic abscess  -continue Zosyn, consult ID  -02/07 on Zosyn and vanc per ID, Infected bileoma per GI, follow fluid cultures, HIDA scan.  -02/08 Cultures gram + cocci, gram + rods, yeast. Planning for discharge tomorrow and would appreciate ID input for home antibiotics. Nursing to educate on management of bilary drain bag. Anemia  -02/07 transfuse 1 unit PRBC     Plan: We will discuss with ID for recommendations for long term antibiotics once discharged home. Patient and family continue to discuss treatment with David Brunson.  We will follow along daily with GI. PT to evaluate discharge needs. Meliza Garcia NP   [facility]  [address] [address]  Office : [phone]  Fax : [phone]   Patient seen, examed and discussed with NP, agree with above history/assessment/plan. 80 y.o.male stage IV pancreatic cancer s/p multiple lines of therapies, developed leukocytosis and chills on 2/3/17 and received biliary stent eval and complicated with massive bleeding and admitted. I have had extensive discussion with Dr. Antoine Thompson, and discussed with pt and family regarding the eminent issues and [address]. We agreed to first aim at managing the liver abscess with IR drain, the necrotic cavity adjacent to duodenum with observation, consult ID for the abscess and colitis rx and prefer to have an outpt regimen to be given at home. We need to determine how the duodenum/CBD cavity affect his diet, for now start TPN transiently. We also discussed incurable cancer and further options of comfort only vs Keytruda, he desires to prolong life if possible, and [place] had good trial result as a novel immunotherapy in his setting and it should have low risk interrupting with above rx, tentatively to arrange for next week. He had external drain placed by Dr. Sonal Dillon, culture pending, HIDA scan to help illustrate the biloma and cavity structure, diarrhea resolved as likely response to abx, again discussed with pt and family, plan to change to oral/home abx, biliary drain bag management education, low residual diet, PT, prepare for Chelsy Varela M.D.   [address]  Office : [phone]  Fax : [phone]

## 2017-02-08 NOTE — PROGRESS NOTES
Problem: Nutrition Deficit  Goal: *Optimize nutritional status  Nutrition  Reason for assessment: Consult for TPN management per nutritional support protocols JUS Burden  Assessment:   Diet order(s): CLQ 2/3, NPO 2/4, CLQ 2/6, Regular 2/7  Food/Nutrition History:  Patient reports eating yogurt and granola this morning, no abdominal pain, n/v. Family request that TPN continue to run until patient get 100% of his appetite back. Central line access : Port. Patient continues to receive zosyn q8 over 30 minutes (TPN held for infusion), vancomycin, protonix, dexamethasone, diflucan. Labs are remarkable for:  · Na trending up to 144 today following a decrease in NaCl in TPN last night from 90 mEq/L to 50 mEq/L. RD to remove NaCl from TPN tonight. Cl 111, C02 stable at 22; Patient currently receiving sodium and potassium in chloride vs acetate. Patient may benefit from change if C02 decreases overnight. · Potassium trended down from 3.7 to 3.5, Phos trended down from 2.4 to 2.0; Patient currently has 30 mEq/L of phosphorus in TPN; RD to increase phos to 35 mEq/L for max phos and calcium in TPN tonight. · Mg stable at 2.0  · Glucose 102  Anthropometrics:Height: 6' 1\" (185.4 cm), Weight: 72.7 kg (160 lb), Body mass index is 21.09 kg/(m^2). Greg Babin  BMI class of underweight for age  Macronutrient needs:  EER: 7694-3305 kcal /day (25-30 kcal/kg listed BW)(86% IBW)  EPR:  grams protein/day (1-1.2 grams/kg IBW)(GFR >60)  Max CHO: 412 grams/day (4mg/kg ABW/min)  Fluid: 1ml/kcal  Intake/Comparative Standards: Current TPN: 10%DEX/ 4.25%AA at 100 ml/hr with 250 ml 20% Lipids daily provides 1520 kcal/d (85% of needs), 85 grams of protein/d (100% of needs), 200 grams of CHO/d (does not exceed maximum CHO load) and 2250 ml of total volume/d ( 100% of needs)      Intervention:   Meals and snacks: Regular  Supplementation: Continue ensure enlive TID  Nutritional Supplement Therapy: Electrolyte replacement per nutritional support protocols are active on MAR. Provide 20 mmole KP04. PN/IVF:   1. Continue TPN: 10%DEX/ 4.25%AA at 100 ml/hr with 250 ml 20% Lipids daily provides 1520 kcal/d (85% of needs), 85 grams of protein/d (100% of needs), 200 grams of CHO/d (does not exceed maximum CHO load) and 2250 ml of total volume/d ( 100% of needs). HOLD TPN for zosyn infusion (approximately 2 hours per day). TPN to run for ~20.5 hours. 1. Changes to additives include: remove all sodium from TPN, Increase KP04 to 35 mEq/L, Increase KCl to 25 mEq/L      2. Add TPN labs, POC Glucoses/SSI if Glucose > 180 mg/dl on AM BMP.       Too [address] [place] [address] [phone]

## 2017-02-08 NOTE — PROGRESS NOTES
GI DAILY PROGRESS NOTE    Admit Date:  2/3/2017    Today's Date:  2/8/2017    CC:  Bleeding S/P ERCP, Pancreatic Ca    Subjective:     Patient reports continued shoulder pain, denies ab pain, N/V. Small stool today w/o bleeding. C Diff negative 2/5/17. Biliary drain placed yesterday. HIDA pending today. Medications:   Current Facility-Administered Medications   Medication Dose Route Frequency    fluconazole (DIFLUCAN) tablet 400 mg  400 mg Oral DAILY    0.9% sodium chloride infusion 250 mL  250 mL IntraVENous PRN    TPN ADULT - dextrose 10% amino acid 4.25%   IntraVENous QPM    loperamide (IMODIUM) capsule 2 mg  2 mg Oral Q4H PRN    NUTRITIONAL SUPPORT ELECTROLYTE PRN ORDERS   Does Not Apply PRN    fat emulsion 20% (LIPOSYN, INTRALIPID) infusion 250 mL  250 mL IntraVENous QPM    vancomycin (VANCOCIN) 1,000 mg in 0.9% sodium chloride (MBP/ADV) 250 mL  1,000 mg IntraVENous Q12H    influenza vaccine 2016-17 (36mos+)(PF) (FLUZONE/FLUARIX/FLULAVAL QUAD) injection 0.5 mL  0.5 mL IntraMUSCular PRIOR TO DISCHARGE    piperacillin-tazobactam (ZOSYN) 3.375 g in 0.9% sodium chloride (MBP/ADV) 100 mL  3.375 g IntraVENous Q8H    diphenhydrAMINE (BENADRYL) capsule 25 mg  25 mg Oral Q6H PRN    dexamethasone (DECADRON) tablet 1 mg  1 mg Oral DAILY    HYDROcodone-acetaminophen (NORCO)  mg tablet 1 Tab  1 Tab Oral Q6H PRN    zolpidem (AMBIEN) tablet 5 mg  5 mg Oral QHS PRN    sodium chloride (NS) flush 5-10 mL  5-10 mL IntraVENous Q8H    sodium chloride (NS) flush 5-10 mL  5-10 mL IntraVENous PRN    HYDROmorphone (PF) (DILAUDID) injection 1 mg  1 mg IntraVENous Q4H PRN    ondansetron (ZOFRAN) injection 4 mg  4 mg IntraVENous Q4H PRN    pantoprazole (PROTONIX) 40 mg in sodium chloride 0.9 % 10 mL injection  40 mg IntraVENous Q24H       Review of Systems:  ROS was obtained, with pertinent positives as listed above. No chest pain or SOB.     Diet:  NPO    Objective:   Vitals:  Visit Vitals    /86 (BP 1 Location: Left arm, BP Patient Position: Supine)    Pulse 64    Temp 98.8 °F (37.1 °C)    Resp 18    Ht 6' 1\" (1.854 m)    Wt 74.4 kg (164 lb)    SpO2 98%    BMI 21.64 kg/m2     Intake/Output:     02/06 1901 - 02/08 0700  In: 579 [P.O.:220; I.V.:359]  Out: 1925 [Urine:975; Drains:950]  Exam:  General appearance: alert, cooperative, no distress  Lungs: clear to auscultation bilaterally anteriorly  Heart: regular rate and rhythm  Abdomen: soft, mildly ttp. Drain w bilious material present. Bowel sounds normal. No masses, no organomegaly   Extremities: extremities normal, atraumatic, no cyanosis or edema  Neuro:  alert and oriented    Data Review (Labs):    Recent Labs      02/08/17   0400  02/07/17   0745  02/07/17   0630  02/06/17   1030   WBC  8.3  12.0*   --   12.5*   HGB  8.6*  8.7*   --   9.1*   HCT  26.4*  26.3*   --   27.7*   PLT  106*  116*   --   115*   MCV  92.0  92.3   --   90.8   NA  144   --   142  141   K  3.5   --   3.7  3.3*   CL  111*   --   110*  108*   CO2  22   --   22  23   BUN  18   --   18  22   CREA  0.74*   --   0.96  0.95   CA  7.8*   --   8.0*  7.6*   MG  2.0   --   2.0  1.9   GLU  102*   --   144*  108*   AP   --    --   568*  560*   SGOT   --    --   44*  51*   ALT   --    --   39  42   TBILI   --    --   1.2*  1.1   CBIL   --    --   0.6*   --    ALB   --    --   2.1*  2.0*  1.9*  1.9*   TP   --    --   6.2*  5.6*       ERCP 2/3/17 Dr Radha Flores  ASSESSMENT:  Invasive pancreatic cancer with duodenal bleed  Stent removed but unable to replace  Massive arterial bleed from the bed of malignant invasion into the duodenal bulb- injected with epi and eventually clotted    CT ABD/P 2/4/17  1. Peribiliary and hepatic abscess. Cannot exclude infected large metastasis  rather than just plain abscess. 2. Intrahepatic biliary distention. 3. New pancreatic metastases and peritoneal implants. Interval progression of  pulmonary metastases. 3. Probable pseudomembranous colitis.   4. Borderline splenomegaly probably indicate some underlying portal  hypertension. ERCP 2/5/17 Dr Rafia Higgins  ASSESSMENT:  1. At least 1 cm necrotic cavity under the surface of duodenal wall that has essentially obliterated the distal CBD-unable to access the biliary system despite multiple maneuvers, expect there is a communication to the duodenal bulb as some food was extracted through the papillotomy site- there was flow of bile so he is not completely obstructed   2. Pancreas not evaluated   3. 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 Abscess drainage as planned  2. If biliary obstruction occurs, he will need percutaneous drainage- he currently is still draining bile through the papillotomy site     Date of Procedure: 2/6/2017  Paracentesis, with CT guidance, Ct drainage with 10 Fr APD placed into abscess. Pre-Procedure Diagnosis: hepatic/peribiliary abscess; pancreatic CA with mets and biliary obstruction. Post-Procedure Diagnosis: infected bileoma, contained perforation of duodenum or CBD. ? Tumor infiltration with necrotic perforation. Brief Description of Procedure: Ct , needed paracentesis prior to abscess drainage.          Assessment:     Principal Problem:    Gastrointestinal hemorrhage with hematemesis (2/4/2017)    Active Problems:    GI bleed (2/4/2017)      Hepatic abscess (2/5/2017)      81 yo patient w pancreatic cancer admitted w bleeding after ERCP 2/3/17. Stable s/p transfusion 2/3/17. CT 2/4 w hepatic abscess - IR plans drain today. On Zosyn. C/O diarrhea beginning 2/5, C Diff negative. ERCP 2/5 w deep malignant ulcer in duodenal bulb w/o bleed. 1 cm necrotic cavity under surface of duodenal wall obliterating distal CBD could not access. Hepatic abscess/infected bilemona on Zoysn per ID. Drain placed by IR yesterday. HIDA today. Oncology is discussing treatment options with patient and family.       Plan:     1) Continue Antibiotics  2) Oncology is discussing treatment options w pt and family  HE IS TOLERATING PO'S  IS NOT A CANDIDATE FOR FURTHER ENDOSCOPIC INTERVENTION  DRAIN IN ABSCESS COMMUNICATING WITH DUOD AND DISTAL CBD  FURTHER TREATMENT OPTIONS PER ONCOLOGY. IF HE IS TO STAY IN THE HOSPITAL FOR FURTHER TREATMENT, PERHAPS ONCOLOGY SERVICE WILL TAKE HIM ON THEIR SERVICE AND I WILL FOLLOW SOCIALLY AND HELP IF I CAN.     Tiki Rm MD

## 2017-02-08 NOTE — PROGRESS NOTES
END OF SHIFT NOTE:    Intake/Output      Voiding: yes   Catheter no  Drain:   Alil-Deluca Drain 10/21/16 (Active)       Drain abdominal drain  02/06/17 Right; Anterior Abdomen (Active)   Site Assessment Clean, dry, & intact 2/7/2017  6:01 PM   Dressing Status Clean, dry, & intact 2/7/2017  6:01 PM   Status Patent;Draining 2/7/2017  6:01 PM   Drainage Description Magalene Poplin 2/7/2017  6:01 PM   Output (ml) 100 ml 2/7/2017  6:01 PM               Stool:  None    Stool Assessment  Stool Color: Green;Brown (02/05/17 1530)  Stool Appearance: Loose (02/05/17 1530)  Stool Amount: Large (02/05/17 1530)  Stool Source/Status: Rectum (02/05/17 1530)    Emesis:  None        VITAL SIGNS  Patient Vitals for the past 12 hrs:   Temp Pulse Resp BP SpO2   02/07/17 1540 98.3 °F (36.8 °C) 76 20 (!) 162/91 98 %   02/07/17 1120 - - - - 96 %   02/07/17 1110 98.8 °F (37.1 °C) 83 18 144/77 97 %       Pain Assessment  Pain 1  Pain Scale 1: Numeric (0 - 10) (02/07/17 1858)  Pain Intensity 1: 6 (02/07/17 1858)  Patient Stated Pain Goal: 0 (02/05/17 0406)  Pain Reassessment 1: Yes (02/05/17 1402)  Pain Onset 1: Approximately 3 years per patient (02/04/17 1444)  Pain Location 1: Shoulder (02/07/17 1858)  Pain Orientation 1: Right (02/07/17 1858)  Pain Description 1: Aching (02/07/17 1858)  Pain Intervention(s) 1: Medication (see MAR) (02/07/17 1858)    Ambulating  Yes in room     Additional Information:  HIDA scan - family anxious to get results in am. Type and screen  Blood - .to be transfuse on coming nurse aware. Shift report given to oncoming nurse Maribell RN  at the bedside.     Hernesto Roblero RN

## 2017-02-09 NOTE — PROGRESS NOTES
Infectious Disease Progress note    Today's Date: 2017   Admit Date: 2/3/2017    Impression:   · Peribiliary/ R hepatic lobe abscess (CT ): s/p () CT drainage: cx candida albicans, MRSA (bactrim R, doxy S)  · Stage 4 metastatic pancreatic cancer (mets to liver, lungs) ongoing cholangitis: s/p 2/3 ERCP with stent removal and discovery of abscess with bile drainage via papillotomy site      Plan:   · Transition to PO doxycycline, augmentin and fluconazole x 4 weeks - eot 3/9/17  · appt made on 3/3/17 at 11:00AM with Sharon Hurtado  · Will follow any imaging IR does - it is possible that the mass is a super-infected met that will not resolve. May just have to follow clinically. Counseled pt that infection is likely to recur in these settings. · Pt may get another stent put in tomorrow, ID will sign off, please call with any further questions     Anti-infectives:   Zosyn (2/3-   Vancomycin (-   Fluconazole (-  Doxycycline  - current  augmentin  - current  Cipro pre-admission since 10/2017    Subjective:   Feels ok but nervous about getting another stent put in. Drain not putting out much fluid    Allergies   Allergen Reactions    Demerol [Meperidine] Nausea and Vomiting    Megace [Megestrol] Rash    Morphine Other (comments)     Severe sweating     Tape [Adhesive] Other (comments)     Paper tape ok  - redness on skin         Review of Systems:  A comprehensive review of systems was negative except for that written in the History of Present Illness.     Objective:     Visit Vitals    /78 (BP 1 Location: Left arm, BP Patient Position: Supine)    Pulse 74    Temp 98.6 °F (37 °C)    Resp 17    Ht 6' 1\" (1.854 m)    Wt 74.4 kg (164 lb)    SpO2 98%    BMI 21.64 kg/m2     Temp (24hrs), Av °F (36.7 °C), Min:97.3 °F (36.3 °C), Max:98.6 °F (37 °C)       Lines:  Peripheral IV:  RUE intact          Physical Exam:    General:  Alert, cooperative, well noursished, well developed, appears stated age   Eyes:  Sclera anicteric. Pupils equally round and reactive to light. Mouth/Throat: Mucous membranes normal, oral pharynx clear       Lungs:   Clear to auscultation bilaterally, good effort   CV:  Regular rate and rhythm,no murmur, click, rub or gallop   Abdomen:   Soft, non-tender.  bowel sounds normal. non-distended, RLQ drain: bilious output   Extremities: No cyanosis or edema   Skin: Skin color, texture, turgor normal. no acute rash or lesions       Musculoskeletal: No swelling or deformity   Lines/Devices:  Intact, no erythema, drainage or tenderness   Psych: Alert and oriented, normal mood affect given the setting       Data Review:     CBC:  Recent Labs      02/09/17   0321  02/08/17   0400  02/07/17   0745   WBC  9.5  8.3  12.0*   GRANS  85*  83*  86*   MONOS  9  8  2*   EOS  0*  1  1   ANEU  8.1  7.0  10.5*   ABL  0.5  0.6  1.2   HGB  10.0*  8.6*  8.7*   HCT  29.8*  26.4*  26.3*   PLT  113*  106*  116*       BMP:  Recent Labs      02/09/17   0321  02/08/17   0400  02/07/17   0630   CREA  0.76*  0.74*  0.96   BUN  20  18  18   NA  145  144  142   K  3.9  3.5  3.7   CL  112*  111*  110*   CO2  22  22  22   AGAP  11  11  10   GLU  107*  102*  144*       LFTS:  Recent Labs      02/09/17   0321  02/07/17   0630   TBILI  4.2*  4.2*  1.2*   ALT  55  39   SGOT  60*  44*   AP  767*  568*   TP  5.7*  6.2*   ALB  1.8*  2.1*  2.0*       Microbiology:     All Micro Results     Procedure Component Value Units Date/Time    CULTURE, BODY FLUID Francheska Jock STAIN [[id number]]  (Abnormal)  (Susceptibility) Collected:  02/06/17 1325    Order Status:  Completed Specimen:  Abscess Updated:  02/09/17 0728     Special Requests: LIVER        GRAM STAIN RARE  GRAM POSITIVE COCCI         MODERATE  GRAM POSITIVE RODS         MODERATE  YEAST         0 TO 1  WBCS SEEN  PER OIF        Culture result: MODERATE  CANDIDA ALBICANS   (A)       LIGHT  **METHICILLIN RESISTANT STAPHYLOCOCCUS AUREUS**  Use Rifampin only in conjunction with another antimicrobial agent. Do not use as Monotherapy. (A)               This Staph species is presumed resistant based on detection of inducible clindamycin resistance      LIGHT  NORMAL SKIN CHRIS ISOLATED       CULTURE, BODY FLUID Alexander Freud STAIN [[id number]] Collected:  17 1325    Order Status:  Completed Specimen: Body Fluid Updated:  17 0820     Special Requests: LIVER ASCITES FLUID      GRAM STAIN NO DEFINITE ORGANISM SEEN         0 TO 5  WBCS SEEN  PER OIF        Culture result: NO GROWTH 2 DAYS       C. DIFFICILE/EPI PCR [[id number]] Collected:  17 2044    Order Status:  Completed Specimen:  Stool Updated:  17 2211     Special Requests: NO SPECIAL REQUESTS        Culture result: NEGATIVE                  Toxigenic C. diff NEGATIVE/ 027-NAP1-BI PRESUMPTIVE NEGATIVE    MRSA SCREEN - PCR (NASAL) [[id number]] Collected:  17 2140    Order Status:  Completed Specimen:  Nasal from Nares Updated:  17 0121     Special Requests: NO SPECIAL REQUESTS        Culture result:         MRSA target DNA is detected (presumptive positive for MRSA colonization). RESULTS VERIFIED, PHONED TO AND READ BACK BY  STACY CONWAY RN AT 0298 ON [id number] BY MPJ             Imagin/4/17 CT abd w/ contrast  IMPRESSION:  1. Peribiliary and hepatic abscess. Cannot exclude infected large metastasis rather than just plain abscess. 2. Intrahepatic biliary distention. 3. New pancreatic metastases and peritoneal implants. Interval progression of pulmonary metastases. 3. Probable pseudomembranous colitis. 4. Borderline splenomegaly probably indicate some underlying portal hypertension.     Signed By: Zachery Santo MD     2017

## 2017-02-09 NOTE — PROGRESS NOTES
GI DAILY PROGRESS NOTE    Admit Date:  2/3/2017    Today's Date:  2/9/2017    CC:  Bleeding S/P ERCP, Pancreatic Ca    Subjective:     Patient reports continued shoulder pain, denies ab pain, N/V. 3 loose stools last night/this am w/o blood. C Diff negative 2/5/17. Biliary drain in place. Anxious to go home. Medications:   Current Facility-Administered Medications   Medication Dose Route Frequency    vancomycin (VANCOCIN) 750 mg in  ml infusion  750 mg IntraVENous Q8H    fluconazole (DIFLUCAN) tablet 400 mg  400 mg Oral DAILY    0.9% sodium chloride infusion 250 mL  250 mL IntraVENous PRN    loperamide (IMODIUM) capsule 2 mg  2 mg Oral Q4H PRN    NUTRITIONAL SUPPORT ELECTROLYTE PRN ORDERS   Does Not Apply PRN    influenza vaccine 2016-17 (36mos+)(PF) (FLUZONE/FLUARIX/FLULAVAL QUAD) injection 0.5 mL  0.5 mL IntraMUSCular PRIOR TO DISCHARGE    piperacillin-tazobactam (ZOSYN) 3.375 g in 0.9% sodium chloride (MBP/ADV) 100 mL  3.375 g IntraVENous Q8H    diphenhydrAMINE (BENADRYL) capsule 25 mg  25 mg Oral Q6H PRN    dexamethasone (DECADRON) tablet 1 mg  1 mg Oral DAILY    HYDROcodone-acetaminophen (NORCO)  mg tablet 1 Tab  1 Tab Oral Q6H PRN    zolpidem (AMBIEN) tablet 5 mg  5 mg Oral QHS PRN    sodium chloride (NS) flush 5-10 mL  5-10 mL IntraVENous Q8H    sodium chloride (NS) flush 5-10 mL  5-10 mL IntraVENous PRN    HYDROmorphone (PF) (DILAUDID) injection 1 mg  1 mg IntraVENous Q4H PRN    ondansetron (ZOFRAN) injection 4 mg  4 mg IntraVENous Q4H PRN    pantoprazole (PROTONIX) 40 mg in sodium chloride 0.9 % 10 mL injection  40 mg IntraVENous Q24H       Review of Systems:  ROS was obtained, with pertinent positives as listed above. No chest pain or SOB.     Diet:  Regular    Objective:   Vitals:  Visit Vitals    /84 (BP 1 Location: Left arm, BP Patient Position: Supine)    Pulse 69    Temp 98.1 °F (36.7 °C)    Resp 17    Ht 6' 1\" (1.854 m)    Wt 74.4 kg (164 lb)    SpO2 97%    BMI 21.64 kg/m2     Intake/Output:     02/07 1901 - 02/09 0700  In: 1625 [P.O.:240; I.V.:1365]  Out: 575 [Urine:150; Drains:425]  Exam:  General appearance: alert, cooperative, no distress  Lungs: clear to auscultation bilaterally anteriorly  Heart: regular rate and rhythm  Abdomen: soft, mildly ttp. Drain w bilious material present. Bowel sounds normal. No masses, no organomegaly   Extremities: extremities normal, atraumatic, no cyanosis or edema  Neuro:  alert and oriented    Data Review (Labs):    Recent Labs      02/09/17   0321  02/08/17   0400  02/07/17   0745  02/07/17   0630  02/06/17   1030   WBC  9.5  8.3  12.0*   --   12.5*   HGB  10.0*  8.6*  8.7*   --   9.1*   HCT  29.8*  26.4*  26.3*   --   27.7*   PLT  113*  106*  116*   --   115*   MCV  90.3  92.0  92.3   --   90.8   NA  145  144   --   142  141   K  3.9  3.5   --   3.7  3.3*   CL  112*  111*   --   110*  108*   CO2  22  22   --   22  23   BUN  20  18   --   18  22   CREA  0.76*  0.74*   --   0.96  0.95   CA  8.1*  7.8*   --   8.0*  7.6*   MG   --   2.0   --   2.0  1.9   GLU  107*  102*   --   144*  108*   AP  767*   --    --   568*  560*   SGOT  60*   --    --   44*  51*   ALT  55   --    --   39  42   TBILI  4.2*   --    --   1.2*  1.1   CBIL   --    --    --   0.6*   --    ALB  1.8*   --    --   2.1*  2.0*  1.9*  1.9*   TP  5.7*   --    --   6.2*  5.6*       HIDA 2/7/17  1. Nonvisualization of biliary activity at 95 minutes with abnormal retention of  activity in the liver and some delayed cardiac blood pool activity concerning  for high-grade biliary obstruction.       ERCP 2/3/17 Dr Peterson Ser  ASSESSMENT:  Invasive pancreatic cancer with duodenal bleed  Stent removed but unable to replace  Massive arterial bleed from the bed of malignant invasion into the duodenal bulb- injected with epi and eventually clotted    CT ABD/P 2/4/17  1. Peribiliary and hepatic abscess.  Cannot exclude infected large metastasis  rather than just plain abscess. 2. Intrahepatic biliary distention. 3. New pancreatic metastases and peritoneal implants. Interval progression of  pulmonary metastases. 3. Probable pseudomembranous colitis. 4. Borderline splenomegaly probably indicate some underlying portal  hypertension. ERCP 2/5/17 Dr Kb Rodrigues  ASSESSMENT:  1. At least 1 cm necrotic cavity under the surface of duodenal wall that has essentially obliterated the distal CBD-unable to access the biliary system despite multiple maneuvers, expect there is a communication to the duodenal bulb as some food was extracted through the papillotomy site- there was flow of bile so he is not completely obstructed   2. Pancreas not evaluated   3. 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 Abscess drainage as planned  2. If biliary obstruction occurs, he will need percutaneous drainage- he currently is still draining bile through the papillotomy site     Date of Procedure: 2/6/2017  Paracentesis, with CT guidance, Ct drainage with 10 Fr APD placed into abscess. Pre-Procedure Diagnosis: hepatic/peribiliary abscess; pancreatic CA with mets and biliary obstruction. Post-Procedure Diagnosis: infected bileoma, contained perforation of duodenum or CBD. ? Tumor infiltration with necrotic perforation. Brief Description of Procedure: Ct , needed paracentesis prior to abscess drainage.          Assessment:     Principal Problem:    Gastrointestinal hemorrhage with hematemesis (2/4/2017)    Active Problems:    GI bleed (2/4/2017)      Hepatic abscess (2/5/2017)      79 yo patient w pancreatic cancer admitted w bleeding after ERCP 2/3/17. Stable s/p transfusion 2/3/17. CT 2/4 w hepatic abscess - IR plans drain today. On Zosyn. C/O diarrhea beginning 2/5, C Diff negative. ERCP 2/5 w deep malignant ulcer in duodenal bulb w/o bleed. 1 cm necrotic cavity under surface of duodenal wall obliterating distal CBD could not access.   Hepatic abscess/infected bilemona on Zoysn per ID. Drain is draining bilious material and patient receiving instruction for care. TPN d/c and patient taking oral. ID awaiting C&S for oral antibiotic recommendations for D/C. He is not a candidate for further endoscopic intervention. Patient is anxious to go home. Plan:     1) Continue Antibiotics - awaiting oral antibiotic recommendations from ID for discharge  2) Oncology is discussing continued  treatment options w pt and family  3) D/C after oral antibiotic recs. Lety Ruby NP   Patient seen in collaboration with Dr Rajeev Knutson. Assessment and plan per Dr Rupesh Maher. ATTENDING NOTE:  I have seen and examined the patient along with my NP/PA. The assessment and plan above is my own. Unfortunately the HIDA proved prophetic and his bili is rapidly climbing. Endo stenting impossible now and percutaneous approach is all that is left. Probably will need to maintain external access and drainage given the messy situation near the opening of the CBD.   Violette Montgomery MD

## 2017-02-09 NOTE — PROGRESS NOTES
Inpatient Hematology / Oncology Progress Note      Admission Date: 2/3/2017  1:39 PM  Reason for Admission/Hospital Course: Malignant neoplasm of other parts of pancreas ([facility] [address] [facility] [C25.7]      24 Hour Events:  Bilirubin climbing   IR consulted Dr. Sarbjit Das susceptibility on cultures  Anxious to go home  Family at bedside      ROS:10 point review of systems is otherwise negative with the exception of the elements mentioned above in the HPI. Allergies   Allergen Reactions    Demerol [Meperidine] Nausea and Vomiting    Megace [Megestrol] Rash    Morphine Other (comments)     Severe sweating     Tape [Adhesive] Other (comments)     Paper tape ok  - redness on skin        OBJECTIVE:  Patient Vitals for the past 8 hrs:   BP Temp Pulse Resp SpO2   17 1132 159/78 98.6 °F (37 °C) 74 17 98 %   17 0812 149/84 98.1 °F (36.7 °C) 69 17 97 %     Temp (24hrs), Av °F (36.7 °C), Min:97.3 °F (36.3 °C), Max:98.6 °F (37 °C)     07 -  1900  In: 120 [P.O.:120]  Out: -     Physical Exam:  Constitutional: Well developedmale in no acute distress, sitting comfortably in the hospital bed. Wife and daughter at bedside. HEENT: Normocephalic and atraumatic. Oropharynx is clear, mucous membranes are moist. Extraocular muscles are intact. Lymph node   Deferred   Skin Warm and dry. No bruising and no rash noted. No erythema. No pallor. Respiratory Lungs are clear to auscultation bilaterally without wheezes, rales or rhonchi, normal air exchange without accessory muscle use. CVS Normal rate, regular rhythm and normal S1 and S2. No murmurs, gallops, or rubs. Abdomen Soft, non tender and nondistended, normoactive bowel sounds. No palpable mass. No hepatosplenomegaly. Biliary drain in place. Neuro Grossly nonfocal with no obvious sensory or motor deficits. MSK Normal range of motion in general.  No edema and no tenderness. Psych Appropriate mood and affect.         Labs: Recent Labs      17   03217   0400  17   0745   WBC  9.5  8.3  12.0*   RBC  3.30*  2.87*  2.85*   HGB  10.0*  8.6*  8.7*   HCT  29.8*  26.4*  26.3*   MCV  90.3  92.0  92.3   MCH  30.3  30.0  30.5   MCHC  33.6  32.6  33.1   RDW  18.5*  18.3*  18.2*   PLT  113*  106*  116*   GRANS  85*  83*  86*   LYMPH  5*  7*  10*   MONOS  9  8  2*   EOS  0*  1  1   BASOS  0  0  0   IG  1.0  1.1  0.8   DF  AUTOMATED  AUTOMATED  AUTOMATED   ANEU  8.1  7.0  10.5*   ABL  0.5  0.6  1.2   ABM  0.8  0.7  0.2   [place]  0.0  0.0  0.1   ABB  0.0  0.0  0.0   AIG  0.1  0.1  0.1        Recent Labs      17   0400  17   0630   NA  145  144  142   K  3.9  3.5  3.7   CL  112*  111*  110*   CO2  22  22  22   AGAP  11  11  10   GLU  107*  102*  144*   BUN  20  18  18   CREA  0.76*  0.74*  0.96   GFRAA  >60  >60  >60   GFRNA  >60  >60  >60   CA  8.1*  7.8*  8.0*   SGOT  60*   --   44*   AP  767*   --   568*   TP  5.7*   --   6.2*   ALB  1.8*   --   2.1*  2.0*   GLOB  3.9*   --   4.1*   AGRAT  0.5*   --   0.5*   MG   --   2.0  2.0   PHOS   --   2.0*  2.4         Imagin2017  1. CT-guided paracentesis. 2. CT-guided percutaneous drainage of infected biloma/abscess at the darlin  hepatis.     Informed consent was obtained from the patient and family prior to this  procedure. They were made aware of the indications, namely need to drain the  abscess as seen on CT dated to 417, alternatives such as surgery and potential  complications including but not limited to introduction or spread of infection,  bleeding, injury, catheter occlusion, dislodgment, pain etc. The understood and  agreed to the procedure.     The patient was placed supine on the CT table and moderate intravenous conscious  sedation for one hour was initiated under my direct supervision. The patient  received intravenous Versed and fentanyl.     Noncontrast  CT images of the abdomen were obtained with a localization  grid.  Skin sites were selected for paracentesis and for CT guided drainage of  the abnormal collection of air and fluid adjacent to the bile duct and duodenum. Paracentesis was necessary since there was excessive ascites.     The skin was prepped and draped using maximum sterile barrier technique. I  personally wore sterile gown with sterile gloves as well as cap and mask.     Using CT guidance, skin site was selected, infiltrated with 2% lidocaine and to  a short dermatotomy, a paracentesis catheter with trocar was advanced into the  peritoneal cavity. Approximately 1.4 L of slightly cloudy ascites were drained. A sample was sent for cultures.     Then, a second skin site close to the first was also infiltrated with 2%  lidocaine and to a short dermatotomy, an 18-gauge Yueh centesis catheter with  dilator was advanced into the area containing fluid and air felt to represent  the abscess. A sample of bilious material which would old an infected was  withdrawn and sent for cultures. A 0.035 stiff, Amplatz guidewire was advanced  through the dilator and coiled in the cavity. The dilator was removed and 8  [place] dilator was used to dilate the tract. A 10 [place] all-purpose drainage  catheter with stiffener was advanced over the wire into the collection,  performed, secured to the skin, and connected to external gravity drainage. At  completion of the procedure, this catheter was draining bile. The catheter was  maintained in place and the paracentesis catheter was removed. Images with the  catheter place were obtained after small amount of dilute contrast was injected. The patient tolerated procedure well and there were no complications.     FINDINGS: As demonstrated on previous CT imaging, a collection of air and fluid  having the appearance of an abscess measures 5 x 2.2 x 2.1 cm. This collection  extends up into the liver and is posterior to the duodenum.  The drainage  catheters coiled in this collection and when contrast was injected through the  catheter, opacifies the collection as well as duodenum distal to this site  confirming existence of localized perforation of the duodenum. Contrast material  did not fill the bile ducts however.     IMPRESSION  IMPRESSION: Paracentesis was performed in order to reduce fluid to permit  CT-guided percutaneous drainage of what has the appearance of an infected  biloma, communicating with the duodenum. Most likely this represents necrotic  neoplasm with a contained perforation. A drain was placed and contrast study to  the drain was performed with CT imaging. 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     Patient's family has been embolized of these findings as as Dr. Patrick De La Rosa, gastroenterologist.     PLAN: Follow-up patient clinically. Follow-up to study with contrast material in  IR in 2 weeks. NUCLEAR MEDICINE HEPATOBILIARY SCAN, 2/7/2017.     CLINICAL HISTORY: Recent ERCP. Bleeding after stent removal.     Technique: Sequential planar images of the abdomen were obtained following the  uneventful intravenous administration of 6.3 mCi Tc 99m-Choletec.     FINDINGS:     Following the administration of radiotracer, there is abnormal retention of  radiotracer activity within the liver. Some delayed cardiac blood pool activity  is also felt to be seen. Imaging was performed through 95 minutes without  biliary activity. Additional imaging of the drainage bag show no activity within  the bag. These findings are concerning for high-grade biliary obstruction.     IMPRESSION  IMPRESSION:  1. Nonvisualization of biliary activity at 95 minutes with abnormal retention of  activity in the liver and some delayed cardiac blood pool activity concerning  for high-grade biliary obstruction.     ASSESSMENT:    Problem List  Date Reviewed: 2/3/2017          Codes Class Noted    Hepatic abscess ICD-10-CM: K75.0  ICD-9-CM: 572.0  2/5/2017        * (Principal)Gastrointestinal hemorrhage with hematemesis ICD-10-CM: K92.0  ICD-9-CM: 578.0  2/4/2017        GI bleed ICD-10-CM: K92.2  ICD-9-CM: 578.9  2/4/2017        Malignant neoplasm of head of pancreas ([facility] ICD-10-CM: C25.0  ICD-9-CM: 157.0  2/3/2017        Right upper quadrant abdominal pain ICD-10-CM: R10.11  ICD-9-CM: 789.01  8/23/2016        Lung nodules ICD-10-CM: R91.8  ICD-9-CM: 793.19  8/14/2015        Leukocytosis ICD-10-CM: D72.829  ICD-9-CM: 288.60  7/28/2015        Lung nodule ICD-10-CM: R91.1  ICD-9-CM: 793.11  7/28/2015    Overview Signed 7/28/2015  9:48 AM by Delfino Beasley NP     Impression: Stable exam except to note a new 7 mm nodular density within the  right upper lung.  This could be further evaluated with CT as an outpatient             Elevated LFTs ICD-10-CM: R79.89  ICD-9-CM: 790.6  7/28/2015        GERD (gastroesophageal reflux disease) (Chronic) ICD-10-CM: K21.9  ICD-9-CM: 530.81  3/25/2015    Overview Signed 3/25/2015  9:37 AM by Marya Gia     controlled with med             Anemia (Chronic) ICD-10-CM: D64.9  ICD-9-CM: 285.9  3/25/2015        Hypertension (Chronic) ICD-10-CM: I10  ICD-9-CM: 401.9  3/25/2015    Overview Signed 3/25/2015  9:38 AM by Marya Gia     controlled with med             Thrombocytopenia, unspecified ([facility] ICD-10-CM: D69.6  ICD-9-CM: 287.5  10/28/2014        Chemotherapy induced neutropenia ([facility] ICD-10-CM: D70.1, T45.1X5A  ICD-9-CM: 288.03, E933.1  9/2/2014        Hyperbilirubinemia ICD-10-CM: E80.6  ICD-9-CM: 782.4  7/24/2014        Cancer ([facility] ICD-10-CM: C80.1  ICD-9-CM: 199.1  9/16/2013        Neuropathy ICD-10-CM: G62.9  ICD-9-CM: 355.9  7/17/2013    Overview Signed 7/17/2013 11:57 AM by Sabas Pulido     7-17-13 new onset will hold therapy             Hypomagnesemia ICD-10-CM: R94.88  ICD-9-CM: 275.2  7/31/2012                PLAN:    Per Dr. Mable Biswas, there is at least 1 cm necrotic cavity under the surface of duodenal wall that has essentially obliterated the distal CBD-unable to access the biliary system despite multiple maneuvers, expect there is a communication to the duodenal bulb as some food was extracted through the papillotomy site- there was flow of bile so he is not completely obstructed. The plan is to drain the abscess and if a bilary obstruction occurs, he will need percutaneous drainage.      Stage IV pancreatic cancer (s/p long Rx of FOLFIRINOX and SBRT x2, second line gem/abraxane, third line xeloda/jakafi)  -Keytruda planned for 2/15/27. Lengthy discussion with Mr. Juan C Barger and his daughter regarding the issues surrounding current hospitalization. CT Abdomen showed peribiliary and hepatic abscess. Cannot exclude infected large metastasis rather than just plain abscess and new pancreatic metastases and peritoneal implants as well as interval progression of pulmonary metastases. We discussed his further treatment options vs no treatment. Mr. Juan C Barger was encouraged to have conversations with his wife and family regarding end of life wishes.      Malnutrition  -consult nutritionist for short term TPN  -02/07 NPO for HIDA scan/ can resume regular diet following scan  -02/08 Resume regular diet. DC TPN once current bag completes.      Hepatic abscess  -continue Zosyn, consult ID  -02/07 on Zosyn and vanc per ID, Infected bileoma per GI, follow fluid cultures, HIDA scan.  -02/08 Cultures gram + cocci, gram + rods, yeast. Planning for discharge tomorrow and would appreciate ID input for home antibiotics. Nursing to educate on management of bilary drain bag.   02/09 Bilirubin up today. Stat consult to IR with Dr. Jasmeet Mendez to place biliary stent    Anemia  -02/07 transfuse 1 unit PRBC  -02/09 stable     Plan: We will discuss with ID for recommendations for long term antibiotics once discharged home. We have consulted IR stat for possible biliary stent placement, hopefully today. Jina Sorto, FRANKLYN   [facility]  [address] [address]  Office : [phone]  Fax : [phone]     Patient seen, examed and discussed with NP, agree with above history/assessment/plan. 80 y.o.male stage IV pancreatic cancer s/p multiple lines of therapies, developed leukocytosis and chills on 2/3/17 and received biliary stent eval and complicated with massive bleeding and admitted. I have had extensive discussion with Dr. Ariel Herndon, and discussed with pt and family regarding the eminent issues and [address]. We agreed to first aim at managing the liver abscess with IR drain, the necrotic cavity adjacent to duodenum with observation, consult ID for the abscess and colitis rx and prefer to have an outpt regimen to be given at home. We need to determine how the duodenum/CBD cavity affect his diet, for now start TPN transiently. We also discussed incurable cancer and further options of comfort only vs Keytruda, he desires to prolong life if possible, and [place] had good trial result as a novel immunotherapy in his setting and it should have low risk interrupting with above rx, tentatively to arrange for next week. He had external drain placed by Dr. Corina Carter, culture pending, HIDA scan to help illustrate the biloma and cavity structure, diarrhea resolved as likely response to abx, again discussed with pt and family, plan to change to oral/home abx, biliary drain bag management education, low residual diet, PT, however bilirubin increased mainly direct bili, urgently consulted IR and discussed with Dr. Corina Carter, arranged for percutaneous biliary stent tomorrow, discuss with ID for home abx, transfer to oncology service.  Thierno Ayala M.D.   [address], [address] [place] [address]  Office : [phone]  Fax : [phone]

## 2017-02-10 NOTE — PROGRESS NOTES
TRANSFER - IN REPORT:    Verbal report received from Heber Owens RN (name) on Osmel Gutierrez  being received from IR (unit) for routine progression of care      Report consisted of patients Situation, Background, Assessment and   Recommendations(SBAR). Information from the following report(s) SBAR was reviewed with the receiving nurse. Opportunity for questions and clarification was provided. Assessment completed upon patients arrival to unit and care assumed.

## 2017-02-10 NOTE — PROCEDURES
Interventional Radiology Brief Procedure Note    Patient: Bk Keller MRN: [id number]  SSN: [id number]    YOB: 1935  Age: 80 y.o. 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 Procedure(s): abscess drainage catheter contrast study, Perc transhepatic cholangiogram, Biliary stent, paracentesis, external biliary drainage of isolated left intrahepatic ducts. Brief Description of Procedure: Paracentesis, the PTC. . Saw stenosis in central RHD, placed stent into CBD, then manipulaed catheter into isolated, and obstructed left ducts and placed external biliary drain to gravity. Performed By: Hattie Strickland MD     Assistants: None    Anesthesia: Moderate Sedation    Estimated Blood Loss: 20 ml    Specimens: ascites for cell count and cultures.     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

## 2017-02-10 NOTE — DISCHARGE SUMMARY
[facility]: In Patient Hematology / Oncology Discharge Summary Note    Patient ID:  Osvaldo Woods  [id number]  83 y.o.  1935    Admit Date: 2/3/2017    Discharge Date: 02/11/2017    Admission Diagnoses: Malignant neoplasm of other parts of pancreas [facility]) [C25.7]    Discharge Diagnoses:  Principal Diagnosis: Gastrointestinal hemorrhage with hematemesis  Principal Problem:    Gastrointestinal hemorrhage with hematemesis (2/4/2017)    Active Problems:    GI bleed (2/4/2017)      Hepatic abscess (2/5/2017)        Hospital Course:  Mr. Yuri Blanton is an 81 yo male patient known to Dr. Jeremiah Carranza for treatment of stage IV pancreatic cancer. He was seen in clinic by Dr. Jeremiah Carranza on 02/03/2017 and complained of having chills. His WBC was 18,000 and he also had increased CA-19-9. Dr. Jeremiah Carranza contacted Dr. Kaitlynn Newton and Mr. Yuri Blanton was scheduled for bilary stent replacement. He was taken for ERCP to remove and replace stent however he developed a massive bleed. Bleeding was stopped. Stent was removed but not replaced. Per Dr. Kaitlynn Newton, there is at least 1 cm necrotic cavity under the surface of duodenal wall that has essentially obliterated the distal CBD-unable to access the biliary system despite multiple maneuvers, expect there is a communication to the duodenal bulb as some food was extracted through the papillotomy site- there was flow of bile so he is not completely obstructed. The plan is to drain the abscess and if a bilary obstruction occurs, he will need percutaneous drainage. On 02/10/2017 Dr. Sukhjinder Mcmullen proceeded with  Paracentesis, the PTC. . Saw stenosis in central RHD, placed stent into CBD, then manipulaed catheter into isolated, and obstructed left ducts and placed external biliary drain to gravity.      Stage IV pancreatic cancer (s/p long Rx of FOLFIRINOX and SBRT x2, second line gem/abraxane, third line xeloda/jakafi)  -Keytruda planned for 2/15/27.  Lengthy discussion with Mr. Yuri Blanton and his daughter regarding the issues surrounding current hospitalization. CT Abdomen showed peribiliary and hepatic abscess. Cannot exclude interval progression of pulmonary metastases. We discussed his further treatment options vs no treatment. Mr. Basim Keller was consulted and he was placed on short term TPN. He has resumed his regular diet with suggestions of certain foods to avoid. ID was consulted regarding treatment gram + cocci, gram + rods, yeast growing in paracentesis fluids. His bilirubin began to rise again and a stat consult to Dr. Quintin Luna to place biliary stent and or determine if bilary drain was blocked. He is anxious to be discharged. He will come in tomorrow for labs and zosyn. He will follow up with Dr. Ayesha Watson next week in clinic on 02/15/2017. He will go home on po doxycycline, Augmentin, and fluconazole.            Consults:  IP CONSULT TO ONCOLOGY  IP CONSULT TO INFECTIOUS DISEASES  IP CONSULT TO INTERVENTIONAL RADIOLOGY    Significant Diagnostic Studies:   Imagin2017  1. CT-guided paracentesis. 2. CT-guided percutaneous drainage of infected biloma/abscess at the darlin  hepatis.      Informed consent was obtained from the patient and family prior to this  procedure. They were made aware of the indications, namely need to drain the  abscess as seen on CT dated to 417, alternatives such as surgery and potential  complications including but not limited to introduction or spread of infection,  bleeding, injury, catheter occlusion, dislodgment, pain etc. The understood and  agreed to the procedure.      The patient was placed supine on the CT table and moderate intravenous conscious  sedation for one hour was initiated under my direct supervision. The patient  received intravenous Versed and fentanyl.      Noncontrast  CT images of the abdomen were obtained with a localization  grid.  Skin sites were selected for paracentesis and for CT guided drainage of  the abnormal collection of air and fluid adjacent to the bile duct and duodenum. Paracentesis was necessary since there was excessive ascites.      The skin was prepped and draped using maximum sterile barrier technique. I  personally wore sterile gown with sterile gloves as well as cap and mask.      Using CT guidance, skin site was selected, infiltrated with 2% lidocaine and to  a short dermatotomy, a paracentesis catheter with trocar was advanced into the  peritoneal cavity. Approximately 1.4 L of slightly cloudy ascites were drained. A sample was sent for cultures.      Then, a second skin site close to the first was also infiltrated with 2%  lidocaine and to a short dermatotomy, an 18-gauge Yueh centesis catheter with  dilator was advanced into the area containing fluid and air felt to represent  the abscess. A sample of bilious material which would old an infected was  withdrawn and sent for cultures. A 0.035 stiff, Amplatz guidewire was advanced  through the dilator and coiled in the cavity. The dilator was removed and 8  Western Allison dilator was used to dilate the tract. A 10 Greek all-purpose drainage  catheter with stiffener was advanced over the wire into the collection,  performed, secured to the skin, and connected to external gravity drainage. At  completion of the procedure, this catheter was draining bile. The catheter was  maintained in place and the paracentesis catheter was removed. Images with the  catheter place were obtained after small amount of dilute contrast was injected. The patient tolerated procedure well and there were no complications.      FINDINGS: As demonstrated on previous CT imaging, a collection of air and fluid  having the appearance of an abscess measures 5 x 2.2 x 2.1 cm. This collection  extends up into the liver and is posterior to the duodenum.  The drainage  catheters coiled in this collection and when contrast was injected through the  catheter, opacifies the collection as well as duodenum distal to this site  confirming existence of localized perforation of the duodenum. Contrast material  did not fill the bile ducts however.      IMPRESSION  IMPRESSION: Paracentesis was performed in order to reduce fluid to permit  CT-guided percutaneous drainage of what has the appearance of an infected  biloma, communicating with the duodenum. Most likely this represents necrotic  neoplasm with a contained perforation. A drain was placed and contrast study to  the drain was performed with CT imaging. 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      Patient's family has been embolized of these findings as as Dr. Kemar Polo, gastroenterologist.      PLAN: Follow-up patient clinically. Follow-up to study with contrast material in  IR in 2 weeks.     NUCLEAR MEDICINE HEPATOBILIARY SCAN, 2/7/2017.      CLINICAL HISTORY: Recent ERCP. Bleeding after stent removal.      Technique: Sequential planar images of the abdomen were obtained following the  uneventful intravenous administration of 6.3 mCi Tc 99m-Choletec.      FINDINGS:      Following the administration of radiotracer, there is abnormal retention of  radiotracer activity within the liver. Some delayed cardiac blood pool activity  is also felt to be seen. Imaging was performed through 95 minutes without  biliary activity. Additional imaging of the drainage bag show no activity within  the bag. These findings are concerning for high-grade biliary obstruction.      IMPRESSION  IMPRESSION:  1. Nonvisualization of biliary activity at 95 minutes with abnormal retention of  activity in the liver and some delayed cardiac blood pool activity concerning  for high-grade biliary obstruction.   Allergies   Allergen Reactions    Demerol [Meperidine] Nausea and Vomiting    Megace [Megestrol] Rash    Morphine Other (comments)     Severe sweating     Tape [Adhesive] Other (comments)     Paper tape ok  - redness on skin        OBJECTIVE:  Patient Vitals for the past 8 hrs:   BP Temp Pulse Resp SpO2   02/10/17 1313 190/90 - 89 17 95 %   02/10/17 1137 (!) 152/93 98 °F (36.7 °C) 69 18 97 %   02/10/17 0816 152/74 98.4 °F (36.9 °C) 70 18 96 %     Temp (24hrs), Av.1 °F (36.7 °C), Min:98 °F (36.7 °C), Max:98.4 °F (36.9 °C)         Physical Exam:  Constitutional: Oriented to person, place, and time. Well-developed and well-nourished. Wife and daughter at bedside. HEENT: Normocephalic and atraumatic. Oropharynx is clear and moist.   Conjunctivae and EOM are normal. No scleral icterus. Lymph node   Deferred   Skin Warm and dry. No bruising and no rash noted. No erythema. No pallor. Hypersensitive area  to right upper chest and upper back. Respiratory Effort normal and breath sounds normal.  No respiratory distress. No wheezes. No rales. No tenderness. CVS Normal rate, regular rhythm and normal heart sounds. Exam reveals no gallop, no friction and no rub. No murmur heard. Abdomen Soft. Bowel sounds are normal. Exhibits no distension. There is no tenderness. There is no rebound and no guarding. Biliary drain in place. Neuro Normal reflexes. No cranial nerve deficit. MSK Normal range of motion. No edema and no tenderness.    Psych Normal mood, affect, behavior, judgment and thought content      Labs:  Recent Labs      02/10/17   0451  17   0321  17   0400   WBC  8.8  9.5  8.3   RBC  3.35*  3.30*  2.87*   HGB  10.0*  10.0*  8.6*   HCT  30.6*  29.8*  26.4*   MCV  91.3  90.3  92.0   MCH  29.9  30.3  30.0   MCHC  32.7  33.6  32.6   RDW  18.7*  18.5*  18.3*   PLT  118*  113*  106*   GRANS  85*  85*  83*   LYMPH  7*  5*  7*   MONOS  6  9  8   EOS  1  0*  1   BASOS  0  0  0   IG  0.8  1.0  1.1   DF  AUTOMATED  AUTOMATED  AUTOMATED   ANEU  7.5  8.1  7.0   ABL  0.6  0.5  0.6   ABM  0.5  0.8  0.7   [place]  0.0  0.0  0.0   ABB  0.0  0.0  0.0   AIG  0.1  0.1  0.1 Recent Labs      02/10/17   0451  02/09/17   0321  02/08/17   0400   NA  146*  145  144   K  3.9  3.9  3.5   CL  113*  112*  111*   CO2  23  22  22   AGAP  10  11  11   GLU  93  107*  102*   BUN  19  20  18   CREA  0.82  0.76*  0.74*   GFRAA  >60  >60  >60   GFRNA  >60  >60  >60   CA  8.0*  8.1*  7.8*   SGOT  89*  60*   --    AP  987*  767*   --    TP  5.8*  5.7*   --    ALB  1.8*  1.8*   --    GLOB  4.0*  3.9*   --    AGRAT  0.5*  0.5*   --    MG  2.1   --   2.0   PHOS  2.6   --   2.0*       ASSESSMENT:    Principal Problem:    Gastrointestinal hemorrhage with hematemesis (2/4/2017)    Active Problems:    GI bleed (2/4/2017)      Hepatic abscess (2/5/2017)      Discharge Medication List as of 2/11/2017 12:25 PM      START taking these medications    Details   !! HYDROcodone-acetaminophen (NORCO)  mg tablet Take 1 Tab by mouth every six (6) hours as needed for Pain. Max Daily Amount: 4 Tabs., Print, Disp-60 Tab, R-0       !! - Potential duplicate medications found. Please discuss with provider. CONTINUE these medications which have CHANGED    Details   amoxicillin-clavulanate (AUGMENTIN) 875-125 mg per tablet Take 1 Tab by mouth every twelve (12) hours for 27 days. , Normal, Disp-54 Tab, R-0      doxycycline (VIBRAMYCIN) 100 mg capsule Take 1 Cap by mouth every twelve (12) hours for 27 days. , Normal, Disp-54 Cap, R-0      fluconazole (DIFLUCAN) 200 mg tablet Take 2 Tabs by mouth daily for 27 days. FDA advises cautious prescribing of oral fluconazole in pregnancy. , Normal, Disp-54 Tab, R-0         CONTINUE these medications which have NOT CHANGED    Details   Omeprazole delayed release (PRILOSEC D/R) 20 mg tablet Take 20 mg by mouth daily. , Historical Med      magnesium oxide (MAG-OX) 400 mg tablet Take 400 mg by mouth., Historical Med      lubiPROStone (AMITIZA) 8 mcg capsule Strength: 8 mcg; Form: ; SIG: take 1 tab by mouth 2 times a day for 30 days, Normal, Disp-60 Cap, R-5      zolpidem (AMBIEN) 5 mg tablet Take 1 Tab by mouth nightly as needed for Sleep. Max Daily Amount: 5 mg., Print, Disp-30 Tab, R-2      !! HYDROcodone-acetaminophen (NORCO)  mg tablet Take 1 Tab by mouth every six (6) hours as needed for Pain. Max Daily Amount: 4 Tabs., Print, Disp-120 Tab, R-0      amylase-lipase-protease (CREON) 24,000-76,000 -120,000 unit capsule Take 1 Cap by mouth three (3) times daily (with meals). , Normal, Disp-270 Cap, R-3      atenolol (TENORMIN) 50 mg tablet Take 1 Tab by mouth every morning., Normal, Disp-90 Tab, R-3      B.infantis-B.ani-B.long-B.bifi (PROBIOTIC 4X) 10-15 mg TbEC Take 1 Tab by mouth every morning., Historical Med      pembrolizumab (KEYTRUDA) 100 mg/4 mL (25 mg/mL) injection 28.84 mL by IntraVENous route every twenty-one (21) days. , Print, Disp-8 Vial, R-10      dexamethasone (DECADRON) 1 mg tablet Historical Med      polyethylene glycol (MIRALAX) 17 gram/dose powder Take 17 g by mouth daily. , Historical Med      senna-docusate (PERICOLACE) 8.6-50 mg per tablet Take 1 Tab by mouth as needed., Historical Med       !! - Potential duplicate medications found. Please discuss with provider. STOP taking these medications       ciprofloxacin HCl (CIPRO) 500 mg tablet Comments:   Reason for Stopping:             PLAN:      Follow-up Appointments   Procedures    FOLLOW UP VISIT Appointment in: Other (Specify) Please make appointment at [address] [place] [address] with infusion center for CMP and CBC Please confirm that patient has a follow up appointment with Dr. Toño Palacio for Monday or Tuesday. Please make appointment at [address] [place] [address] with infusion center for CMP and CBC  Please confirm that patient has a follow up appointment with Dr. Toño Palacio for Monday or Tuesday. Standing Status:   Standing     Number of Occurrences:   1     Order Specific Question:   Appointment in     Answer:    Other (Specify)               Brianne Doan NP    [address]  Office : [phone]  Fax : [phone]  Patient seen, examed and discussed with NP, agree with above history/assessment/plan. 80 y.o.male stage IV pancreatic cancer s/p multiple lines of therapies, developed leukocytosis and chills on 2/3/17 and received biliary stent eval and complicated with massive bleeding and admitted. I have had extensive discussion with Dr. Paul Gallardo, and discussed with pt and family regarding the eminent issues and [address]. We agreed to first aim at managing the liver abscess with IR drain, the necrotic cavity adjacent to duodenum with observation, consult ID for the abscess and colitis rx and prefer to have an outpt regimen to be given at home. We need to determine how the duodenum/CBD cavity affect his diet, for now start TPN transiently. We also discussed incurable cancer and further options of comfort only vs Keytruda, he desires to prolong life if possible, and [place] had good trial result as a novel immunotherapy in his setting and it should have low risk interrupting with above rx, tentatively to arrange for next week. He had external drain placed by Dr. Sindi Cook, culture pending, HIDA scan to help illustrate the biloma and cavity structure, diarrhea resolved as likely response to abx, again discussed with pt and family, plan to change to oral/home abx, biliary drain bag management education, low residual diet, PT, however bilirubin increased mainly direct bili, urgently consulted IR and discussed with Dr. Sindi Cook, arranged for percutaneous biliary stent today, had an extensive procedure and I had an extensive discussion with Dr. Sindi Cook, revised the drain to the abscess, which he did not see much pus but rather a necrotic cavity mainly, placed a metal stent to CBD which can be accessed with ERCP, also found left hepatic duct obsruction and placed external drain and I wonder whether that can be stented in future, dc home on po doxycycline, Augmentin, and fluconazole, follow robin in outpt with another dose of zosyn and CBC/CMP. Teresa Bal M.D.   [address]  Office : [phone]  Fax : [phone]

## 2017-02-10 NOTE — PROGRESS NOTES
TRANSFER - OUT REPORT:    Verbal report given to Vito Dumas RN (name) on Osmel Gutierrez being transferred to IR (unit) for ordered procedure       Report consisted of patients Situation, Background, Assessment and   Recommendations(SBAR). Information from the following report(s) SBAR was reviewed with the receiving nurse. Opportunity for questions and clarification was provided.

## 2017-02-10 NOTE — PROGRESS NOTES
END OF SHIFT NOTE: Patient returned from IR late this afternoon still very tired from procedure. BP elevated, but better than when in IR. See procedure notes for details. Patient currently resting quietly in bed. Intake/Output  02/10 0701 - 02/10 1900  In: -   Out: 2520 [Urine:200]   Voiding: YES  Catheter: NO  Drain:   Alli-Deluca Drain 10/21/16 (Active)       Drain abdominal drain  02/06/17 Right; Anterior Abdomen (Active)   Site Assessment Clean, dry, & intact 2/10/2017  8:30 AM   Dressing Status Clean, dry, & intact 2/10/2017  8:30 AM   Status Patent;Draining; To gravity 2/10/2017  8:30 AM   Drainage Description Mary Scriver 2/9/2017  3:15 AM   Intake (ml) 10 ml 2/9/2017  3:15 AM   Output (ml) 0 ml 2/10/2017  8:30 AM       Drain PTC drain 02/10/17 Right; Lower Abdomen (Active)     Stool:  0 occurrences. Stool Assessment  Stool Color: Brown (02/08/17 1400)  Stool Appearance: Loose (02/08/17 1400)  Stool Amount: Medium (02/08/17 1400)  Stool Source/Status: Rectum (02/08/17 1400)    Emesis:  0 occurrences.         VITAL SIGNS  Patient Vitals for the past 12 hrs:   Temp Pulse Resp BP SpO2   02/10/17 1752 97.4 °F (36.3 °C) 75 16 156/86 94 %   02/10/17 1731 - 76 17 (!) 182/96 95 %   02/10/17 1726 - 75 17 (!) 180/101 94 %   02/10/17 1721 - 77 16 (!) 184/93 93 %   02/10/17 1716 - 75 17 185/89 95 %   02/10/17 1711 - 75 17 (!) 179/96 95 %   02/10/17 1706 - 78 17 (!) 184/98 94 %   02/10/17 1701 - 78 14 (!) 188/95 95 %   02/10/17 1656 - 79 17 (!) 184/97 94 %   02/10/17 1634 - 78 10 (!) 194/96 98 %   02/10/17 1629 - 78 20 (!) 187/96 98 %   02/10/17 1624 - 76 19 (!) 190/99 97 %   02/10/17 1619 - 77 10 (!) 189/98 98 %   02/10/17 1614 - 62 19 (!) 176/94 98 %   02/10/17 1610 - 68 21 185/90 97 %   02/10/17 1609 - 78 11 (!) 186/94 98 %   02/10/17 1604 - 78 18 (!) 179/93 98 %   02/10/17 1559 - 75 21 (!) 184/99 97 %   02/10/17 1554 - 76 25 (!) 183/97 97 %   02/10/17 1549 - 75 23 (!) 182/96 94 %   02/10/17 1544 - 74 16 (!) 181/94 97 % 02/10/17 1540 - 73 11 - 98 %   02/10/17 1539 - 74 15 167/90 98 %   02/10/17 1538 - 72 15 - 96 %   02/10/17 1534 - 71 22 (!) 180/93 98 %   02/10/17 1530 - 72 23 - -   02/10/17 1529 - 71 19 (!) 181/94 97 %   02/10/17 1524 - 73 20 (!) 171/93 97 %   02/10/17 1519 - 72 11 (!) 177/91 97 %   02/10/17 1514 - 69 8 (!) 171/96 96 %   02/10/17 1509 - 71 18 (!) 177/93 96 %   02/10/17 1504 - 75 16 (!) 179/97 97 %   02/10/17 1459 - 71 16 (!) 183/94 97 %   02/10/17 1454 - 76 16 185/90 99 %   02/10/17 1448 - 75 18 (!) 190/92 98 %   02/10/17 1313 - 89 17 190/90 95 %   02/10/17 1137 98 °F (36.7 °C) 69 18 (!) 152/93 97 %   02/10/17 0816 98.4 °F (36.9 °C) 70 18 152/74 96 %       Pain Assessment  Pain 1  Pain Scale 1: Numeric (0 - 10) (02/10/17 1706)  Pain Intensity 1: 0 (02/10/17 1731)  Patient Stated Pain Goal: 0 (02/10/17 0900)  Pain Reassessment 1: Yes (02/10/17 0900)  Pain Onset 1: Approximately 3 years per patient (02/04/17 1444)  Pain Location 1: Shoulder (02/10/17 0830)  Pain Orientation 1: Right (02/10/17 0830)  Pain Description 1: Aching (02/10/17 0830)  Pain Intervention(s) 1: Medication (see MAR) (02/10/17 0830)    Ambulating  No    Shift report given to oncoming nurse at the bedside.     Jennifer Dai, RN

## 2017-02-10 NOTE — PROGRESS NOTES
GI DAILY PROGRESS NOTE    Admit Date:  2/3/2017    Today's Date:  2/10/2017    CC:  Bleeding S/P ERCP, Pancreatic Ca    Subjective:     Patient reports continued shoulder pain, denies ab pain, N/V. 1 loose but formed stool this am.  NPO for IR procedure       Medications:   Current Facility-Administered Medications   Medication Dose Route Frequency    doxycycline (VIBRAMYCIN) capsule 100 mg  100 mg Oral Q12H    amoxicillin-clavulanate (AUGMENTIN) 875-125 mg per tablet 1 Tab  1 Tab Oral Q12H    fluconazole (DIFLUCAN) tablet 400 mg  400 mg Oral DAILY    0.9% sodium chloride infusion 250 mL  250 mL IntraVENous PRN    loperamide (IMODIUM) capsule 2 mg  2 mg Oral Q4H PRN    NUTRITIONAL SUPPORT ELECTROLYTE PRN ORDERS   Does Not Apply PRN    influenza vaccine 2016-17 (36mos+)(PF) (FLUZONE/FLUARIX/FLULAVAL QUAD) injection 0.5 mL  0.5 mL IntraMUSCular PRIOR TO DISCHARGE    diphenhydrAMINE (BENADRYL) capsule 25 mg  25 mg Oral Q6H PRN    dexamethasone (DECADRON) tablet 1 mg  1 mg Oral DAILY    HYDROcodone-acetaminophen (NORCO)  mg tablet 1 Tab  1 Tab Oral Q6H PRN    zolpidem (AMBIEN) tablet 5 mg  5 mg Oral QHS PRN    sodium chloride (NS) flush 5-10 mL  5-10 mL IntraVENous Q8H    sodium chloride (NS) flush 5-10 mL  5-10 mL IntraVENous PRN    HYDROmorphone (PF) (DILAUDID) injection 1 mg  1 mg IntraVENous Q4H PRN    ondansetron (ZOFRAN) injection 4 mg  4 mg IntraVENous Q4H PRN    pantoprazole (PROTONIX) 40 mg in sodium chloride 0.9 % 10 mL injection  40 mg IntraVENous Q24H       Review of Systems:  ROS was obtained, with pertinent positives as listed above. No chest pain or SOB.     Diet:  NPO    Objective:   Vitals:  Visit Vitals    /74 (BP 1 Location: Left arm, BP Patient Position: Supine)    Pulse 70    Temp 98.4 °F (36.9 °C)    Resp 18    Ht 6' 1\" (1.854 m)    Wt 74.4 kg (164 lb)    SpO2 96%    BMI 21.64 kg/m2     Intake/Output:     02/08 1901 - 02/10 0700  In: 600 [P.O.:240; I.V.:350]  Out: 225 [Urine:150; Drains:75]  Exam:  General appearance: alert, cooperative, no distress  Lungs: clear to auscultation bilaterally anteriorly  Heart: regular rate and rhythm  Abdomen: soft, mildly ttp. Drain w bilious material present. Bowel sounds normal. No masses, no organomegaly   Extremities: extremities normal, atraumatic, no cyanosis or edema  Neuro:  alert and oriented    Data Review (Labs):    Recent Labs      02/10/17   0451  02/09/17   0321  02/08/17   0400   WBC  8.8  9.5  8.3   HGB  10.0*  10.0*  8.6*   HCT  30.6*  29.8*  26.4*   PLT  118*  113*  106*   MCV  91.3  90.3  92.0   NA  146*  145  144   K  3.9  3.9  3.5   CL  113*  112*  111*   CO2  23  22  22   BUN  19  20  18   CREA  0.82  0.76*  0.74*   CA  8.0*  8.1*  7.8*   MG  2.1   --   2.0   GLU  93  107*  102*   AP  987*  767*   --    SGOT  89*  60*   --    ALT  75*  55   --    TBILI  6.1*  4.2*  4.2*   --    CBIL   --   3.4*   --    ALB  1.8*  1.8*   --    TP  5.8*  5.7*   --        HIDA 2/7/17  1. Nonvisualization of biliary activity at 95 minutes with abnormal retention of  activity in the liver and some delayed cardiac blood pool activity concerning  for high-grade biliary obstruction.       ERCP 2/3/17 Dr Antonella Holland  ASSESSMENT:  Invasive pancreatic cancer with duodenal bleed  Stent removed but unable to replace  Massive arterial bleed from the bed of malignant invasion into the duodenal bulb- injected with epi and eventually clotted    CT ABD/P 2/4/17  1. Peribiliary and hepatic abscess. Cannot exclude infected large metastasis  rather than just plain abscess. 2. Intrahepatic biliary distention. 3. New pancreatic metastases and peritoneal implants. Interval progression of  pulmonary metastases. 3. Probable pseudomembranous colitis. 4. Borderline splenomegaly probably indicate some underlying portal  hypertension. ERCP 2/5/17 Dr Antonella Holland  ASSESSMENT:  1.  At least 1 cm necrotic cavity under the surface of duodenal wall that has essentially obliterated the distal CBD-unable to access the biliary system despite multiple maneuvers, expect there is a communication to the duodenal bulb as some food was extracted through the papillotomy site- there was flow of bile so he is not completely obstructed   2. Pancreas not evaluated   3. 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 Abscess drainage as planned  2. If biliary obstruction occurs, he will need percutaneous drainage- he currently is still draining bile through the papillotomy site     Date of Procedure: 2/6/2017  Paracentesis, with CT guidance, Ct drainage with 10 Fr APD placed into abscess. Pre-Procedure Diagnosis: hepatic/peribiliary abscess; pancreatic CA with mets and biliary obstruction. Post-Procedure Diagnosis: infected bileoma, contained perforation of duodenum or CBD. ? Tumor infiltration with necrotic perforation. Brief Description of Procedure: Ct , needed paracentesis prior to abscess drainage.          Assessment:     Principal Problem:    Gastrointestinal hemorrhage with hematemesis (2/4/2017)    Active Problems:    GI bleed (2/4/2017)      Hepatic abscess (2/5/2017)      79 yo patient w pancreatic cancer admitted w bleeding after ERCP 2/3/17. Stable s/p transfusion 2/3/17. CT 2/4 w hepatic abscess - with drain placed by IR. HIDA with obstruction and bili rising. IR to re evaluate today with possible stent placement. Intermitted diarrhea, C Diff negative. ERCP 2/5 w deep malignant ulcer in duodenal bulb w/o bleed. 1 cm necrotic cavity under surface of duodenal wall obliterating distal CBD could not access. Hepatic abscess/infected bilemona,  Switched to Augmentin, doxy and diflucan per ID. Patient taking oral. He is not a candidate for further endoscopic intervention. Oncology has taken over as primary and plans possible D/C today (pening IR findings) with close follow up over the weekend.       Plan:     1) Continue Antibiotics per ID  2) D/C per Oncology pending IR findings/procedure today    Bradley Miller NP   Patient seen in collaboration with Dr Cathie Shirley. Assessment and plan per Dr Monica Mora. ATTENDING NOTE:  I have seen and examined the patient along with my NP/PA. The assessment and plan above is my own. We will sign off and follow up on request but our role in palliative care is unfortunately likely over.       Isaac Uriarte MD

## 2017-02-10 NOTE — PROGRESS NOTES
Interventional Radiology Prep Area Handoff      Allergies   Allergen Reactions    Demerol [Meperidine] Nausea and Vomiting    Megace [Megestrol] Rash    Morphine Other (comments)     Severe sweating     Tape [Adhesive] Other (comments)     Paper tape ok  - redness on skin        IRSummary reviewed. Pt identified with two identifiers. Verified procedure with Patient and IR Provider as biliary stent placement. Consent obtained: yes H&P complete/updated: yes MD airway complete: yes    Sedation:yes   NPO: yes   Anticoagulation: no     Pt shaved: no   Antibiotics: no  Anesthesia notified: n/a    Additional Notes: n/a      Lines:  Peripherally Inserted Central Catheter:     Central Venous Catheter:     Venous Access Device:  Venous Access Device port (Active)   Central Line Being Utilized Yes 2/10/2017  8:30 AM   Criteria for Appropriate Use Long term IV/antibiotic administration 2/10/2017  8:30 AM   Site Assessment Clean, dry, & intact 2/10/2017 11:49 AM   Date of Last Dressing Change 02/03/17 2/10/2017 11:49 AM   Dressing Status Clean, dry, & intact 2/10/2017 11:49 AM   Dressing Type Disk with Chlorhexadine Gluconate (CHG); Transparent 2/10/2017  8:30 AM   Action Taken Blood drawn 2/10/2017  3:21 AM   Date Accessed (Medial Site) 02/03/17 2/10/2017  8:30 AM   Positive Blood Return (Medial Site) Yes 2/10/2017  8:30 AM   Action Taken (Medial Site) Alcohol;Flushed 2/10/2017  8:30 AM   Alcohol Cap Used No 2/10/2017  8:30 AM     Peripheral Intravenous Line:     Hemodialysis Catheter:     Drain(s):  Alli-Deluca Drain 10/21/16 (Active)       Drain abdominal drain  02/06/17 Right; Anterior Abdomen (Active)   Site Assessment Clean, dry, & intact 2/10/2017  8:30 AM   Dressing Status Clean, dry, & intact 2/10/2017  8:30 AM   Status Patent;Draining; To gravity 2/10/2017  8:30 AM   Drainage Description Magalene Poplin 2/9/2017  3:15 AM   Intake (ml) 10 ml 2/9/2017  3:15 AM   Output (ml) 0 ml 2/10/2017  8:30 AM       Labs verified: yes  Recent Results (from the past 24 hour(s))   VANCOMYCIN, TROUGH    Collection Time: 02/09/17  3:38 PM   Result Value Ref Range    Vancomycin,trough 19.6 5 - 20 ug/mL   MAGNESIUM    Collection Time: 02/10/17  4:51 AM   Result Value Ref Range    Magnesium 2.1 1.8 - 2.4 mg/dL   PHOSPHORUS    Collection Time: 02/10/17  4:51 AM   Result Value Ref Range    Phosphorus 2.6 2.3 - 3.7 MG/DL   CBC WITH AUTOMATED DIFF    Collection Time: 02/10/17  4:51 AM   Result Value Ref Range    WBC 8.8 4.3 - 11.1 K/uL    RBC 3.35 (L) 4.23 - 5.67 M/uL    HGB 10.0 (L) 13.6 - 17.2 g/dL    HCT 30.6 (L) 41.1 - 50.3 %    MCV 91.3 79.6 - 97.8 FL    MCH 29.9 26.1 - 32.9 PG    MCHC 32.7 31.4 - 35.0 g/dL    RDW 18.7 (H) 11.9 - 14.6 %    PLATELET 431 (L) 817 - 450 K/uL    MPV 10.2 (L) 10.8 - 14.1 FL    DF AUTOMATED      NEUTROPHILS 85 (H) 43 - 78 %    LYMPHOCYTES 7 (L) 13 - 44 %    MONOCYTES 6 4.0 - 12.0 %    EOSINOPHILS 1 0.5 - 7.8 %    BASOPHILS 0 0.0 - 2.0 %    IMMATURE GRANULOCYTES 0.8 0.0 - 5.0 %    ABS. NEUTROPHILS 7.5 1.7 - 8.2 K/UL    ABS. LYMPHOCYTES 0.6 0.5 - 4.6 K/UL    ABS. MONOCYTES 0.5 0.1 - 1.3 K/UL    ABS. EOSINOPHILS 0.0 0.0 - 0.8 K/UL    ABS. BASOPHILS 0.0 0.0 - 0.2 K/UL    ABS. IMM. GRANS. 0.1 0.0 - 0.5 K/UL   METABOLIC PANEL, COMPREHENSIVE    Collection Time: 02/10/17  4:51 AM   Result Value Ref Range    Sodium 146 (H) 136 - 145 mmol/L    Potassium 3.9 3.5 - 5.1 mmol/L    Chloride 113 (H) 98 - 107 mmol/L    CO2 23 21 - 32 mmol/L    Anion gap 10 7 - 16 mmol/L    Glucose 93 65 - 100 mg/dL    BUN 19 8 - 23 MG/DL    Creatinine 0.82 0.8 - 1.5 MG/DL    GFR est AA >60 >60 ml/min/1.73m2    GFR est non-AA >60 >60 ml/min/1.73m2    Calcium 8.0 (L) 8.3 - 10.4 MG/DL    Bilirubin, total 6.1 (H) 0.2 - 1.1 MG/DL    ALT (SGPT) 75 (H) 12 - 65 U/L    AST (SGOT) 89 (H) 15 - 37 U/L    Alk.  phosphatase 987 (H) 50 - 136 U/L    Protein, total 5.8 (L) 6.3 - 8.2 g/dL    Albumin 1.8 (L) 3.2 - 4.6 g/dL    Globulin 4.0 (H) 2.3 - 3.5 g/dL    A-G Ratio 0.5 (L) 1.2 - 3.5       Patient Vitals for the past 8 hrs:   Temp Pulse Resp BP SpO2   02/10/17 1313 - 89 17 190/90 95 %   02/10/17 1137 98 °F (36.7 °C) 69 18 (!) 152/93 97 %   02/10/17 0816 98.4 °F (36.9 °C) 70 18 152/74 96 %

## 2017-02-10 NOTE — PROGRESS NOTES
Problem: Nutrition Deficit  Goal: *Optimize nutritional status  Nutrition Follow-Up Day 6      Assessment:   Diet order(s): NPO (2/9); Mechanical soft (2/8)  Food/Nutrition Patient History: Patient scheduled to go to IR today for a biliary stent. Patient reports he ate 2/3 of dinner last night of roast beef, mashed potatoes, broccoli, and a cookie before current NPO status. He also reports he has been drinking 2 Ensures a day. Patient's family was in room and reported they have been bringing him Ensure, and he has been receiving it on all of his meal trays. Patient asked for helpful tips of foods that are good to at when he goes home. Patient stated he liked eggs and fish and would try to eat high protein foods when at home. Pertinent meds: Decadron, Vibramycin   Anthropometrics:Height: 6' 1\" (185.4 cm), Weight: 72.7 kg (160 lb), Body mass index is 21.09 kg/(m^2). Brianna Gagnon BMI class of underweight for age  Macronutrient needs:  EER: 4333-6458 kcal /day (25-30 kcal/kg listed BW)(86% IBW)  EPR:  grams protein/day (1-1.2 grams/kg IBW)(GFR >60)  Intake/Comparative Standards: Average intake for past 6 recorded meal(s): 44%. This potentially meets ~54% of kcal and ~48% of protein needs. Current NPO status does not meet estimated needs. Nutrition Diagnosis: Inadequate oral intake r/t altered GI function as evidenced by patient with scheduled surgery for biliary stent, NPO. Intervention:  Meals and snacks: Advance diet per MD.  Nutrition Supplement Therapy: Ensure High Protein, TID  Education: Educated patient on importance of eating high protein foods such as lean meat, fish and eggs and the importance of eating regular meals throughout the day. Coordination of care: CAROLINA Goetz      Monitor & Evaluation  1. Percent intake.      Cleveland Blackwell, Dietetic Intern

## 2017-02-10 NOTE — PROGRESS NOTES
TRANSFER - IN REPORT:    Verbal report received from Jean Paul RN(name) on Vivienne [place]  being received from IR(unit) for routine post - op      Report consisted of patients Situation, Background, Assessment and   Recommendations(SBAR). Information from the following report(s) Procedure Summary and MAR was reviewed with the receiving nurse. Opportunity for questions and clarification was provided. Assessment completed upon patients arrival to unit and care assumed.

## 2017-02-10 NOTE — PROGRESS NOTES
IR techs in to see pt and place stitch around new drain per pt leaking ascites fluid around drain. Dr. Damaris Hays aware.

## 2017-02-10 NOTE — PROGRESS NOTES
Inpatient Hematology / Oncology Progress Note      Admission Date: 2/3/2017  1:39 PM  Reason for Admission/Hospital Course: Malignant neoplasm of other parts of pancreas ([address] [C25.7]      24 Hour Events:  Bilirubin climbing   IR today with Dr. Jose Mac  Anxious to go home  Family at bedside      ROS:10 point review of systems is otherwise negative with the exception of the elements mentioned above in the HPI.      Allergies   Allergen Reactions    Demerol [Meperidine] Nausea and Vomiting    Megace [Megestrol] Rash    Morphine Other (comments)     Severe sweating     Tape [Adhesive] Other (comments)     Paper tape ok  - redness on skin        OBJECTIVE:  Patient Vitals for the past 8 hrs:   BP Temp Pulse Resp SpO2 Weight   02/10/17 1726 (!) 180/101 - 75 17 94 % -   02/10/17 1721 (!) 184/93 - 77 16 93 % -   02/10/17 1716 185/89 - 75 17 95 % -   02/10/17 1711 (!) 179/96 - 75 17 95 % -   02/10/17 1706 (!) 184/98 - 78 17 94 % -   02/10/17 1701 (!) 188/95 - 78 14 95 % -   02/10/17 1656 (!) 184/97 - 79 17 94 % -   02/10/17 1634 (!) 194/96 - 78 10 98 % -   02/10/17 1629 (!) 187/96 - 78 20 98 % -   02/10/17 1624 (!) 190/99 - 76 19 97 % -   02/10/17 1619 (!) 189/98 - 77 10 98 % -   02/10/17 1614 (!) 176/94 - 62 19 98 % -   02/10/17 1610 185/90 - 68 21 97 % -   02/10/17 1609 (!) 186/94 - 78 11 98 % -   02/10/17 1604 (!) 179/93 - 78 18 98 % -   02/10/17 1559 (!) 184/99 - 75 21 97 % -   02/10/17 1554 (!) 183/97 - 76 25 97 % -   02/10/17 1549 (!) 182/96 - 75 23 94 % -   02/10/17 1544 (!) 181/94 - 74 16 97 % -   02/10/17 1540 - - 73 11 98 % -   02/10/17 1539 167/90 - 74 15 98 % -   02/10/17 1538 - - 72 15 96 % -   02/10/17 1534 (!) 180/93 - 71 22 98 % -   02/10/17 1530 - - 72 23 - -   02/10/17 1529 (!) 181/94 - 71 19 97 % -   02/10/17 1524 (!) 171/93 - 73 20 97 % -   02/10/17 1519 (!) 177/91 - 72 11 97 % -   02/10/17 1514 (!) 171/96 - 69 8 96 % -   02/10/17 1509 (!) 177/93 - 71 18 96 % -   02/10/17 1505 - - - - - 158 lb 11.7 oz (72 kg)   02/10/17 1504 (!) 179/97 - 75 16 97 % -   02/10/17 1459 (!) 183/94 - 71 16 97 % -   02/10/17 1454 185/90 - 76 16 99 % -   02/10/17 1448 (!) 190/92 - 75 18 98 % -   02/10/17 1313 190/90 - 89 17 95 % -   02/10/17 1137 (!) 152/93 98 °F (36.7 °C) 69 18 97 % -     Temp (24hrs), Av.1 °F (36.7 °C), Min:98 °F (36.7 °C), Max:98.4 °F (36.9 °C)    02/10 07 - 02/10 1900  In: -   Out: 2335     Physical Exam:  Constitutional: Well developedmale in no acute distress, sitting comfortably in the hospital bed. Wife and daughter at bedside. HEENT: Normocephalic and atraumatic. Oropharynx is clear, mucous membranes are moist. Extraocular muscles are intact. Lymph node   Deferred   Skin Warm and dry. No bruising and no rash noted. No erythema. No pallor. Hypersensitive area on left upper chest and left upper back   Respiratory Lungs are clear to auscultation bilaterally without wheezes, rales or rhonchi, normal air exchange without accessory muscle use. CVS Normal rate, regular rhythm and normal S1 and S2. No murmurs, gallops, or rubs. Abdomen Soft, non tender and nondistended, normoactive bowel sounds. No palpable mass. Biliary drain in place. Neuro Grossly nonfocal with no obvious sensory or motor deficits. MSK Normal range of motion in general.  No edema and no tenderness. Psych Appropriate mood and affect.         Labs:      Recent Labs      02/10/17   0451  17   0321  17   0400   WBC  8.8  9.5  8.3   RBC  3.35*  3.30*  2.87*   HGB  10.0*  10.0*  8.6*   HCT  30.6*  29.8*  26.4*   MCV  91.3  90.3  92.0   MCH  29.9  30.3  30.0   MCHC  32.7  33.6  32.6   RDW  18.7*  18.5*  18.3*   PLT  118*  113*  106*   GRANS  85*  85*  83*   LYMPH  7*  5*  7*   MONOS  6  9  8   EOS  1  0*  1   BASOS  0  0  0   IG  0.8  1.0  1.1   DF  AUTOMATED  AUTOMATED  AUTOMATED   ANEU  7.5  8.1  7.0   ABL  0.6  0.5  0.6   ABM  0.5  0.8  0.7   [place]  0.0  0.0  0.0   ABB  0.0  0.0  0.0   AIG  0.1  0.1  0.1 Recent Labs      02/10/17   0451  17   0321  17   0400   NA  146*  145  144   K  3.9  3.9  3.5   CL  113*  112*  111*   CO2  23  22  22   AGAP  10  11  11   GLU  93  107*  102*   BUN  19  20  18   CREA  0.82  0.76*  0.74*   GFRAA  >60  >60  >60   GFRNA  >60  >60  >60   CA  8.0*  8.1*  7.8*   SGOT  89*  60*   --    AP  987*  767*   --    TP  5.8*  5.7*   --    ALB  1.8*  1.8*   --    GLOB  4.0*  3.9*   --    AGRAT  0.5*  0.5*   --    MG  2.1   --   2.0   PHOS  2.6   --   2.0*         Imagin2017  1. CT-guided paracentesis. 2. CT-guided percutaneous drainage of infected biloma/abscess at the darlin  hepatis.     Informed consent was obtained from the patient and family prior to this  procedure. They were made aware of the indications, namely need to drain the  abscess as seen on CT dated to 417, alternatives such as surgery and potential  complications including but not limited to introduction or spread of infection,  bleeding, injury, catheter occlusion, dislodgment, pain etc. The understood and  agreed to the procedure.     The patient was placed supine on the CT table and moderate intravenous conscious  sedation for one hour was initiated under my direct supervision. The patient  received intravenous Versed and fentanyl.     Noncontrast  CT images of the abdomen were obtained with a localization  grid. Skin sites were selected for paracentesis and for CT guided drainage of  the abnormal collection of air and fluid adjacent to the bile duct and duodenum. Paracentesis was necessary since there was excessive ascites.     The skin was prepped and draped using maximum sterile barrier technique. I  personally wore sterile gown with sterile gloves as well as cap and mask.     Using CT guidance, skin site was selected, infiltrated with 2% lidocaine and to  a short dermatotomy, a paracentesis catheter with trocar was advanced into the  peritoneal cavity.  Approximately 1.4 L of slightly cloudy ascites were drained. A sample was sent for cultures.     Then, a second skin site close to the first was also infiltrated with 2%  lidocaine and to a short dermatotomy, an 18-gauge [employer] centesis catheter with  dilator was advanced into the area containing fluid and air felt to represent  the abscess. A sample of bilious material which would old an infected was  withdrawn and sent for cultures. A 0.035 stiff, Amplatz guidewire was advanced  through the dilator and coiled in the cavity. The dilator was removed and 8  [place] dilator was used to dilate the tract. A 10 Faroese all-purpose drainage  catheter with stiffener was advanced over the wire into the collection,  performed, secured to the skin, and connected to external gravity drainage. At  completion of the procedure, this catheter was draining bile. The catheter was  maintained in place and the paracentesis catheter was removed. Images with the  catheter place were obtained after small amount of dilute contrast was injected. The patient tolerated procedure well and there were no complications.     FINDINGS: As demonstrated on previous CT imaging, a collection of air and fluid  having the appearance of an abscess measures 5 x 2.2 x 2.1 cm. This collection  extends up into the liver and is posterior to the duodenum. The drainage  catheters coiled in this collection and when contrast was injected through the  catheter, opacifies the collection as well as duodenum distal to this site  confirming existence of localized perforation of the duodenum. Contrast material  did not fill the bile ducts however.     IMPRESSION  IMPRESSION: Paracentesis was performed in order to reduce fluid to permit  CT-guided percutaneous drainage of what has the appearance of an infected  biloma, communicating with the duodenum. Most likely this represents necrotic  neoplasm with a contained perforation.  A drain was placed and contrast study to  the drain was performed with CT imaging. 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     Patient's family has been embolized of these findings as as Dr. Justine Stanton, gastroenterologist.     PLAN: Follow-up patient clinically. Follow-up to study with contrast material in  IR in 2 weeks. NUCLEAR MEDICINE HEPATOBILIARY SCAN, 2/7/2017.     CLINICAL HISTORY: Recent ERCP. Bleeding after stent removal.     Technique: Sequential planar images of the abdomen were obtained following the  uneventful intravenous administration of 6.3 mCi Tc 99m-Choletec.     FINDINGS:     Following the administration of radiotracer, there is abnormal retention of  radiotracer activity within the liver. Some delayed cardiac blood pool activity  is also felt to be seen. Imaging was performed through 95 minutes without  biliary activity. Additional imaging of the drainage bag show no activity within  the bag. These findings are concerning for high-grade biliary obstruction.     IMPRESSION  IMPRESSION:  1. Nonvisualization of biliary activity at 95 minutes with abnormal retention of  activity in the liver and some delayed cardiac blood pool activity concerning  for high-grade biliary obstruction.     ASSESSMENT:    Problem List  Date Reviewed: 2/3/2017          Codes Class Noted    Hepatic abscess ICD-10-CM: K75.0  ICD-9-CM: 572.0  2/5/2017        * (Principal)Gastrointestinal hemorrhage with hematemesis ICD-10-CM: K92.0  ICD-9-CM: 578.0  2/4/2017        GI bleed ICD-10-CM: K92.2  ICD-9-CM: 578.9  2/4/2017        Malignant neoplasm of head of pancreas ([facility] ICD-10-CM: C25.0  ICD-9-CM: 157.0  2/3/2017        Right upper quadrant abdominal pain ICD-10-CM: R10.11  ICD-9-CM: 789.01  8/23/2016        Lung nodules ICD-10-CM: R91.8  ICD-9-CM: 793.19  8/14/2015        Leukocytosis ICD-10-CM: V68.386  ICD-9-CM: 288.60  7/28/2015        Lung nodule ICD-10-CM: R91.1  ICD-9-CM: 793.11  7/28/2015    Overview Signed 7/28/2015  9:48 AM by Freddy Dietz NP     Impression: Stable exam except to note a new 7 mm nodular density within the  right upper lung. This could be further evaluated with CT as an outpatient             Elevated LFTs ICD-10-CM: R79.89  ICD-9-CM: 790.6  7/28/2015        GERD (gastroesophageal reflux disease) (Chronic) ICD-10-CM: K21.9  ICD-9-CM: 530.81  3/25/2015    Overview Signed 3/25/2015  9:37 AM by John Espinosatingham     controlled with med             Anemia (Chronic) ICD-10-CM: D64.9  ICD-9-CM: 285.9  3/25/2015        Hypertension (Chronic) ICD-10-CM: I10  ICD-9-CM: 401.9  3/25/2015    Overview Signed 3/25/2015  9:38 AM by John Ronda     controlled with med             Thrombocytopenia, unspecified ([facility] ICD-10-CM: D69.6  ICD-9-CM: 287.5  10/28/2014        Chemotherapy induced neutropenia ([facility] ICD-10-CM: D70.1, T45.1X5A  ICD-9-CM: 288.03, E933.1  9/2/2014        Hyperbilirubinemia ICD-10-CM: E80.6  ICD-9-CM: 782.4  7/24/2014        Cancer ([facility] ICD-10-CM: C80.1  ICD-9-CM: 199.1  9/16/2013        Neuropathy ICD-10-CM: G62.9  ICD-9-CM: 355.9  7/17/2013    Overview Signed 7/17/2013 11:57 AM by Amanda Alvarez     7-17-13 new onset will hold therapy             Hypomagnesemia ICD-10-CM: V65.76  ICD-9-CM: 275.2  7/31/2012                PLAN:    Per Dr. Gabo Baer, there is at least 1 cm necrotic cavity under the surface of duodenal wall that has essentially obliterated the distal CBD-unable to access the biliary system despite multiple maneuvers, expect there is a communication to the duodenal bulb as some food was extracted through the papillotomy site- there was flow of bile so he is not completely obstructed.  The plan is to drain the abscess and if a bilary obstruction occurs, he will need percutaneous drainage.      Stage IV pancreatic cancer (s/p long Rx of FOLFIRINOX and SBRT x2, second line gem/abraxane, third line xeloda/jakafi)  -Courtney Soni planned for 2/15/27. Lengthy discussion with Mr. Palmer Blevins and his daughter regarding the issues surrounding current hospitalization. CT Abdomen showed peribiliary and hepatic abscess. Cannot exclude infected large metastasis rather than just plain abscess and new pancreatic metastases and peritoneal implants as well as interval progression of pulmonary metastases. We discussed his further treatment options vs no treatment. Mr. Palmer Blevins was encouraged to have conversations with his wife and family regarding end of life wishes.      Malnutrition  -consult nutritionist for short term TPN  -02/07 NPO for HIDA scan/ can resume regular diet following scan  -02/08 Resume regular diet. DC TPN once current bag completes.      Hepatic abscess  -continue Zosyn, consult ID  -02/07 on Zosyn and vanc per ID, Infected bileoma per GI, follow fluid cultures, HIDA scan.  -02/08 Cultures gram + cocci, gram + rods, yeast. Planning for discharge tomorrow and would appreciate ID input for home antibiotics. Nursing to educate on management of bilary drain bag.   02/09 Bilirubin up today. Stat consult to IR with Dr. Jodi Arzate to place biliary stent  02/10 To IR for stent replacement    Anemia  -02/07 transfuse 1 unit PRBC  -02/09 stable     Plan: Patient was hoping to go home today however procedure in IR late in the day so we will discuss discharge with patient and family tomorrow. Lala Mccartney NP   [address] [facility]  [address] [place] [address]  Office : [phone]  Fax : [phone]   Patient seen, examed and discussed with NP, agree with above history/assessment/plan. 80 y.o.male stage IV pancreatic cancer s/p multiple lines of therapies, developed leukocytosis and chills on 2/3/17 and received biliary stent eval and complicated with massive bleeding and admitted. I have had extensive discussion with Dr. Shweta Cross, and discussed with pt and family regarding the eminent issues and [address].  We agreed to first aim at managing the liver abscess with IR drain, the necrotic cavity adjacent to duodenum with observation, consult ID for the abscess and colitis rx and prefer to have an outpt regimen to be given at home. We need to determine how the duodenum/CBD cavity affect his diet, for now start TPN transiently. We also discussed incurable cancer and further options of comfort only vs Keytruda, he desires to prolong life if possible, and [place] had good trial result as a novel immunotherapy in his setting and it should have low risk interrupting with above rx, tentatively to arrange for next week. He had external drain placed by Dr. Mary Anne Simon, culture pending, HIDA scan to help illustrate the biloma and cavity structure, diarrhea resolved as likely response to abx, again discussed with pt and family, plan to change to oral/home abx, biliary drain bag management education, low residual diet, PT, however bilirubin increased mainly direct bili, urgently consulted IR and discussed with Dr. Mary Anne Simon, arranged for percutaneous biliary stent today, had an extensive procedure and I had an extensive discussion with Dr. Mary Anne Simon, revised the drain to the abscess, which he did not see much pus but rather a necrotic cavity mainly, placed a metal stent to CBD which can be accessed with ERCP, also found left hepatic duct obsruction and placed external drain and I wonder whether that can be stented in future, monitor bilirubin and infection.      Skyla Brunson M.D.   [place] [address]  Office : [phone]  Fax : [phone]

## 2017-02-10 NOTE — PROGRESS NOTES
TRANSFER - OUT REPORT:    Verbal report given to Sofy RN(name) on Anuradha Townsend  being transferred to [facility](unit) for routine progression of care       Report consisted of patients Situation, Background, Assessment and   Recommendations(SBAR). Information from the following report(s) Procedure Summary was reviewed with the receiving nurse. Lines:   Venous Access Device port (Active)   Central Line Being Utilized Yes 2/10/2017  8:30 AM   Criteria for Appropriate Use Long term IV/antibiotic administration 2/10/2017  8:30 AM   Site Assessment Clean, dry, & intact 2/10/2017 11:49 AM   Date of Last Dressing Change 02/03/17 2/10/2017 11:49 AM   Dressing Status Clean, dry, & intact 2/10/2017 11:49 AM   Dressing Type Disk with Chlorhexadine Gluconate (CHG); Transparent 2/10/2017  8:30 AM   Action Taken Blood drawn 2/10/2017  3:21 AM   Date Accessed (Medial Site) 02/03/17 2/10/2017  8:30 AM   Positive Blood Return (Medial Site) Yes 2/10/2017  8:30 AM   Action Taken (Medial Site) Alcohol;Flushed 2/10/2017  8:30 AM   Alcohol Cap Used No 2/10/2017  8:30 AM        Opportunity for questions and clarification was provided.       Patient transported with:   [employer]

## 2017-02-11 NOTE — DISCHARGE INSTRUCTIONS
DISCHARGE SUMMARY from Nurse    The following personal items are in your possession at time of discharge:    Dental Appliances: None  Visual Aid: At bedside     Home Medications: None  Jewelry: Watch  Clothing: Pajamas  Other Valuables: Cell Phone             PATIENT INSTRUCTIONS:    After general anesthesia or intravenous sedation, for 24 hours or while taking prescription Narcotics:  · Limit your activities  · Do not drive and operate hazardous machinery  · Do not make important personal or business decisions  · Do  not drink alcoholic beverages  · If you have not urinated within 8 hours after discharge, please contact your surgeon on call. Report the following to your surgeon:  · Excessive pain, swelling, redness or odor of or around the surgical area  · Temperature over 100.5  · Nausea and vomiting lasting longer than 4 hours or if unable to take medications  · Any signs of decreased circulation or nerve impairment to extremity: change in color, persistent  numbness, tingling, coldness or increase pain  · Any questions        What to do at Home:  Recommended activity: Activity as tolerated. *  Please give a list of your current medications to your Primary Care Provider. *  Please update this list whenever your medications are discontinued, doses are      changed, or new medications (including over-the-counter products) are added. *  Please carry medication information at all times in case of emergency situations. These are general instructions for a healthy lifestyle:    No smoking/ No tobacco products/ Avoid exposure to second hand smoke    Surgeon General's Warning:  Quitting smoking now greatly reduces serious risk to your health.     Obesity, smoking, and sedentary lifestyle greatly increases your risk for illness    A healthy diet, regular physical exercise & weight monitoring are important for maintaining a healthy lifestyle    You may be retaining fluid if you have a history of heart failure or if you experience any of the following symptoms:  Weight gain of 3 pounds or more overnight or 5 pounds in a week, increased swelling in our hands or feet or shortness of breath while lying flat in bed. Please call your doctor as soon as you notice any of these symptoms; do not wait until your next office visit. Recognize signs and symptoms of STROKE:    F-face looks uneven    A-arms unable to move or move unevenly    S-speech slurred or non-existent    T-time-call 911 as soon as signs and symptoms begin-DO NOT go       Back to bed or wait to see if you get better-TIME IS BRAIN. Warning Signs of HEART ATTACK     Call 911 if you have these symptoms:   Chest discomfort. Most heart attacks involve discomfort in the center of the chest that lasts more than a few minutes, or that goes away and comes back. It can feel like uncomfortable pressure, squeezing, fullness, or pain.  Discomfort in other areas of the upper body. Symptoms can include pain or discomfort in one or both arms, the back, neck, jaw, or stomach.  Shortness of breath with or without chest discomfort.  Other signs may include breaking out in a cold sweat, nausea, or lightheadedness. Don't wait more than five minutes to call 911 - MINUTES MATTER! Fast action can save your life. Calling 911 is almost always the fastest way to get lifesaving treatment. Emergency Medical Services staff can begin treatment when they arrive -- up to an hour sooner than if someone gets to the hospital by car. The discharge information has been reviewed with the patient. The patient verbalized understanding. Discharge medications reviewed with the patient and appropriate educational materials and side effects teaching were provided.

## 2017-02-12 NOTE — PROGRESS NOTES
Transition of Care Discharge Follow-up Questionnaire   Date/Time of Call:   2/12/17 2:22p   What was the patient hospitalized for? Gastrointestinal hemorrhage with hematemesis   Does the patient understand his/her diagnosis and/or treatment and what happened during the hospitalization? Patients spouse verbalized understanding of treatment and diagnosis. Did the patient receive discharge instructions? Yes   Review any discharge instructions (see notes in [facility]). Ask patient if they understand these. Do they have any questions? She verbalized understanding of instructions. Were home services ordered (nursing, PT, OT, ST, etc.)? Per chart review patient does not have orders for [facility]. Daughter and spouse states patient is to receive Nursing [facility] services. If so, has the first visit occurred? If not, why? (Assist with coordination of services if necessary.) Per daughter and spouse, they are awaiting first phone call to schedule visits to occur. Was any DME ordered? No   If so, has it been received? If not, why?  (Assist with coordination of arranging DME orders if necessary.) n/a   Complete a review of all medications (new, continued and discontinued meds per the D/C instructions and medication tab in [facility]). Patients spouse states medications are received. Were all new prescriptions filled? If not, why?  (Assist with obtainment of medications if necessary.)  Yes     Does the patient understand the purpose and dosing instructions for all medications? (If patient has questions, provide explanation and education.) She indicates understanding of purpose and dosing instructions for all medications. Does the patient have any problems in performing ADLs? (If patient is unable to perform ADLs  what is the limiting factor(s)?   Do they have a support system that can assist? If no support system is present, discuss possible assistance that they may be able to obtain.) Spouse did not indicate patient has a problem with mobility. Patient has family support which includes daughter and spouse. Does the patient have all follow-up appointments scheduled? Has transportation been arranged? [facility]monary follow-up should be within 7 days of discharge; all others should have PCP follow-up within 7 days of discharge; follow-ups with other specialists as appropriate or ordered.) Patient has f/u appointment with Author Phil. Any other questions or concerns expressed by the patient? Daughter states patient is running out of gauze, tegaderm, and chloroform. Care coordinator asked daughter if gauze was saturated at current time. She verbalized that gauze was not saturated, but she only had enough medical equipment for one more dressing change. She states [facility] was supposed to supply her father with medical supplies. [facility] appointment was not available per chart review or information pertaining to assigned [facility] nurse. Care coordinator gave daughter Rashad Lopez [facility] number ([phone]) to call in regards to this issue. Daughter states she will call today. Daughter thanked care coordinator for call. [facility] Call Completed By: IHSAN Holly [address] Coordinator          This note will not be viewable in [address]

## 2017-02-12 NOTE — PROGRESS NOTES
Patient seen in infusion center with family at bedside. He received IV zosyn. Labs were reviewed with patient. Patient complaint with incision drainage at biliary drain site. We discussed that this primarily was occurring when patient was up and about. We educated daughter on dressing techniques to help with this. We considered IV vancomycin today as a precaution but patient was hesitate to stay longer. He is feeling well and in good spirits. He has follow up with Dr. Bassam Chamberlain on Wednesday. Advised to call with any concerns. Patient seen, examed and discussed with NP, agree with above history/assessment/plan. 80 y.o.male stage IV pancreatic cancer s/p multiple lines of therapies, developed leukocytosis and chills on 2/3/17 and received biliary stent eval and complicated with massive bleeding and admitted. I have had extensive discussion with Dr. Nayan Nur, and discussed with pt and family regarding the eminent issues and [address]. We agreed to first aim at managing the liver abscess with IR drain, the necrotic cavity adjacent to duodenum with observation, consult ID for the abscess and colitis rx and prefer to have an outpt regimen to be given at home. We need to determine how the duodenum/CBD cavity affect his diet, for now start TPN transiently. We also discussed incurable cancer and further options of comfort only vs Keytruda, he desires to prolong life if possible, and [place] had good trial result as a novel immunotherapy in his setting and it should have low risk interrupting with above rx, tentatively to arrange for next week. He had external drain placed by Dr. Eliezer Evangelista, culture pending, HIDA scan to help illustrate the biloma and cavity structure, diarrhea resolved as likely response to abx, again discussed with pt and family, plan to change to oral/home abx, biliary drain bag management education, low residual diet, PT, however bilirubin increased mainly direct bili, urgently consulted IR and discussed with Dr. Sonal Dillon, arranged for percutaneous biliary stent today, had an extensive procedure and I had an extensive discussion with Dr. Sonal Dillon, revised the drain to the abscess, which he did not see much pus but rather a necrotic cavity mainly, placed a metal stent to CBD which can be accessed with ERCP, also found left hepatic duct obsruction and placed external drain and I wonder whether that can be stented in future, dc'd home on po doxycycline, Augmentin, and fluconazole, followed in outpt with another dose of zosyn and CBC/CMP showed WBC and bili trended down, there is still leaking around the drain appeared related to body position, follow Dr. Sonal Dillon next week, see in clinic on Wed for [address]. Westley Rowley M.D.   [address], [address] [place] [address]  Office : [phone]

## 2017-02-12 NOTE — PROGRESS NOTES
Pt arrived ambulatory for labs and antibiotics. He c/o mild referred pain in right shoulder. Pt orthostatic today, ns given. Pt's daughter requested RN to assess drain site and to flush drain. Drain flushes easily. Site is saturated (at 0950) with serous fluid, small amount of pink tinge noted on gauze. Per pt's daughter, the site was dry at 0730 this morning. Discussed the fact that drainage could increase as pt is up and moving around. Also discussed with Palmer Giraldo NP and Dr. Brian Wong. Both to chairside to talk with pt and his family. Dressing changed. Pt dc'd stable, to return to [facility] on 2/15 at 1500.

## 2017-02-12 NOTE — PROGRESS NOTES
[facility] - INPATIENT  Face to Face Encounter    Patients Name: Rhett Sandoval    YOB: 1935    Primary Diagnosis: metastatic pancreatic carcinoma, obstructive jaundice, abscess with biliary drains placement     Date of Face to Face:   2/12/2017       Ordering Physician: Amber Childs MD                           Face to Face Encounter findings are related to primary reason for home care:   yes. 1. I certify that the patient needs intermittent care as follows: skilled nursing care:  teaching/training of biliary drain care and dressing changes and skilled observation/assessment, patient education    2. I certify that this patient is homebound, that is: 1) patient requires the use of a unknown device, special transportation, or assistance of another to leave the home; or 2) patient's condition makes leaving the home medically contraindicated; and 3) patient has a normal inability to leave the home and leaving the home requires considerable and taxing effort. Patient may leave the home for infrequent and short duration for medical reasons, and occasional absences for non-medical reasons. Homebound status is due to the following functional limitations: Patient currently under activity restrictions secondary to recent surgical procedure, this hinders their ability to safely leave the home. 3. I certify that this patient is under my care and that I, or a nurse practitioner or  [id number], or clinical nurse specialist, or certified nurse midwife, working with me, had a Face-to-Face Encounter that meets the physician Face-to-Face Encounter requirements.   The following are the clinical findings from the [address] [place] [address] encounter that support the need for skilled services and is a summary of the encounter: see hospital medical record    See discharge summary      Kati Rodriguez LMSW  2/12/2017      THE FOLLOWING TO BE COMPLETED BY THE COMMUNITY PHYSICIAN:    I concur with the findings described above from the F2F encounter that this patient is homebound and in need of a skilled service.     Certifying Physician: _____________________________________      Printed Certifying Physician Name: _____________________________________    Date: _________________

## 2017-02-13 NOTE — PROGRESS NOTES
Patient discharged with new dressing and drainage bags flushed/replaced before discharge. Eric Easley will be scheduling patient to be seen by Dr. Ravinder Weir in 2 weeks. Patient ambulated out of department steady on feet.

## 2017-02-13 NOTE — PROGRESS NOTES
Patient arrived for assessment of previous surgical site. Patient oriented to plan and port accessed for lab draw.

## 2017-02-13 NOTE — PROGRESS NOTES
Shanika Ng replacing stiches around patient's drain on right side. Patient given lidocaine prior to stitch replacement.

## 2017-02-16 NOTE — PROGRESS NOTES
Arrived to the [facility]. Day 1 Cycle 1 Keytruda completed. Patient tolerated well. Any issues or concerns during appointment: Yes. Norco for shoulder pain given. Biliary drain and abcess drain flushed. Daughter flushing both drains at home daily. Aspirated 10 ml brownish drainage from abcess drain then flushed with 10 ml NS. Pt nima procedure well. Patient aware of next infusion appointment on 03/07/17 (date) at [phone] (time). Discharged ambulatory with spouse.

## 2017-02-17 PROBLEM — C80.1 BILIARY OBSTRUCTION DUE TO CANCER (HCC): Status: ACTIVE | Noted: 2017-01-01

## 2017-02-17 PROBLEM — R53.1 WEAKNESS: Status: ACTIVE | Noted: 2017-01-01

## 2017-02-17 PROBLEM — R11.2 INTRACTABLE NAUSEA AND VOMITING: Status: ACTIVE | Noted: 2017-01-01

## 2017-02-17 PROBLEM — K83.1 BILIARY OBSTRUCTION DUE TO CANCER (HCC): Status: ACTIVE | Noted: 2017-01-01

## 2017-02-17 NOTE — PROGRESS NOTES
TRANSFER - IN REPORT:    Verbal report received from Cruz Gonzalez RN (name) on Jeovanny Moulton  being received from IR (unit) for routine post - op      Report consisted of patients Situation, Background, Assessment and   Recommendations(SBAR). Information from the following report(s) Procedure Summary was reviewed with the receiving nurse. Opportunity for questions and clarification was provided. Assessment completed upon patients arrival to unit and care assumed.

## 2017-02-17 NOTE — ANESTHESIA PREPROCEDURE EVALUATION
Anesthetic History   No history of anesthetic complications            Review of Systems / Medical History  Patient summary reviewed and pertinent labs reviewed    Pulmonary  Within defined limits                 Neuro/Psych   Within defined limits           Cardiovascular    Hypertension: well controlled              Exercise tolerance: >4 METS     GI/Hepatic/Renal     GERD: well controlled      Liver disease     Endo/Other        Cancer (prostate, pancreatic) and anemia     Other Findings   Comments: Prostate cancer  Pancreatic cancer with liver mets           Physical Exam    Airway  Mallampati: II  TM Distance: > 6 cm  Neck ROM: normal range of motion   Mouth opening: Normal     Cardiovascular    Rhythm: regular  Rate: normal         Dental    Dentition: Caps/crowns     Pulmonary  Breath sounds clear to auscultation               Abdominal         Other Findings            Anesthetic Plan    ASA: 3  Anesthesia type: general        Post procedure ventilation   Induction: Intravenous  Anesthetic plan and risks discussed with: Patient and Family

## 2017-02-17 NOTE — ANESTHESIA POSTPROCEDURE EVALUATION
Post-Anesthesia Evaluation and Assessment    Patient: William Muhammad MRN: [id number]  SSN: [id number]    YOB: 1935  Age: 80 y.o. Sex: male       Cardiovascular Function/Vital Signs  Visit Vitals    /84    Pulse 67    Temp 36.4 °C (97.6 °F)    Resp 11    Ht 6' 1\" (1.854 m)    Wt 72.6 kg (160 lb)    SpO2 100%    BMI 21.11 kg/m2       Patient is status post general anesthesia for Procedure(s):  NERVE BLOCK,  UPSIZE DRAINAGE CATHETER . Nausea/Vomiting: None    Postoperative hydration reviewed and adequate. Pain:  Pain Scale 1: Visual (02/17/17 1659)  Pain Intensity 1: 0 (02/17/17 1720)   Managed    Neurological Status:   Neuro (WDL): Within Defined Limits (02/17/17 1659)   At baseline    Mental Status and Level of Consciousness: Arousable    Pulmonary Status:   O2 Device: Nasal cannula (02/17/17 1659)   Adequate oxygenation and airway patent    Complications related to anesthesia: None    Post-anesthesia assessment completed.  No concerns    Signed By: Juan David Eagle MD     February 17, 2017

## 2017-02-17 NOTE — PROGRESS NOTES
Patient transported to 5th floor for progression of care. Patient becoming more alert, but mentation is slightly altered. Patient not following commands as well as prior to procedure. Informed receiving nurse of patient's status. All questions answered and patient made comfortable before leaving. Wife at bedside.

## 2017-02-17 NOTE — IP AVS SNAPSHOT
Current Discharge Medication List  
  
Take these medications at their scheduled times Dose & Instructions Dispensing Information Comments Morning Noon Evening Bedtime  
 amoxicillin-clavulanate 875-125 mg per tablet Commonly known as:  AUGMENTIN Your next dose is: Today, Tomorrow Other:  ____________ Dose:  1 Tab Take 1 Tab by mouth every twelve (12) hours for 27 days. Quantity:  54 Tab Refills:  0  
     
   
   
   
  
 amylase-lipase-protease 24,000-76,000 -120,000 unit capsule Commonly known as:  CREON Your next dose is: Today, Tomorrow Other:  ____________ Dose:  1 Cap Take 1 Cap by mouth three (3) times daily (with meals). Quantity:  270 Cap Refills:  3  
     
   
   
   
  
 atenolol 50 mg tablet Commonly known as:  TENORMIN Your next dose is: Today, Tomorrow Other:  ____________ Dose:  50 mg Take 1 Tab by mouth every morning. Quantity:  90 Tab Refills:  3  
     
   
   
   
  
 doxycycline 100 mg capsule Commonly known as:  VIBRAMYCIN Your next dose is: Today, Tomorrow Other:  ____________ Dose:  100 mg Take 1 Cap by mouth every twelve (12) hours for 27 days. Quantity:  54 Cap Refills:  0  
     
   
   
   
  
 fluconazole 200 mg tablet Commonly known as:  DIFLUCAN Your next dose is: Today, Tomorrow Other:  ____________ Dose:  400 mg Take 2 Tabs by mouth daily for 27 days. FDA advises cautious prescribing of oral fluconazole in pregnancy. Quantity:  54 Tab Refills:  0 MIRALAX 17 gram/dose powder Generic drug:  polyethylene glycol Your next dose is: Today, Tomorrow Other:  ____________ Dose:  17 g Take 17 g by mouth daily. Refills:  0 Omeprazole delayed release 20 mg tablet Commonly known as:  PRILOSEC D/R  
   
 Your next dose is: Today, Tomorrow Other:  ____________ Dose:  20 mg Take 20 mg by mouth daily. Refills:  0  
     
   
   
   
  
 oxyCODONE ER 15 mg ER tablet Commonly known as:  OxyCONTIN Your next dose is: Today, Tomorrow Other:  ____________ Dose:  15 mg Take 1 Tab by mouth every twelve (12) hours. Max Daily Amount: 30 mg.  
 Quantity:  60 Tab Refills:  0  
     
   
   
   
  
 pembrolizumab 100 mg/4 mL (25 mg/mL) injection Commonly known as:  GHBFFNSH Your next dose is: Today, Tomorrow Other:  ____________ Dose:  10 mg/kg 28.84 mL by IntraVENous route every twenty-one (21) days. Quantity:  8 Vial  
Refills:  10 PROBIOTIC 4X 10-15 mg Tbec Generic drug:  B.infantis-B.ani-B.long-B.bifi Your next dose is: Today, Tomorrow Other:  ____________ Dose:  1 Tab Take 1 Tab by mouth every morning. Refills:  0 Take these medications as needed Dose & Instructions Dispensing Information Comments Morning Noon Evening Bedtime * HYDROcodone-acetaminophen  mg tablet Commonly known as:  Birda Denver Your next dose is: Today, Tomorrow Other:  ____________ Dose:  1 Tab Take 1 Tab by mouth every six (6) hours as needed for Pain. Max Daily Amount: 4 Tabs. Quantity:  120 Tab Refills:  0  
     
   
   
   
  
 * HYDROcodone-acetaminophen  mg tablet Commonly known as:  Birda Denver Your next dose is: Today, Tomorrow Other:  ____________ Dose:  1 Tab Take 1 Tab by mouth every six (6) hours as needed for Pain. Max Daily Amount: 4 Tabs. Quantity:  60 Tab Refills:  0  
     
   
   
   
  
 ondansetron hcl 8 mg tablet Commonly known as:  Laruth Downer Your next dose is: Today, Tomorrow Other:  ____________ Dose:  8 mg Take 8 mg by mouth every eight (8) hours as needed for Nausea. Refills:  0  
     
   
   
   
  
 senna-docusate 8.6-50 mg per tablet Commonly known as:  Zelphia Clonts Your next dose is: Today, Tomorrow Other:  ____________ Dose:  1 Tab Take 1 Tab by mouth as needed. Refills:  0  
     
   
   
   
  
 zolpidem 5 mg tablet Commonly known as:  AMBIEN Your next dose is: Today, Tomorrow Other:  ____________ Dose:  5 mg Take 1 Tab by mouth nightly as needed for Sleep. Max Daily Amount: 5 mg. Quantity:  30 Tab Refills:  2  
     
   
   
   
  
 * Notice: This list has 2 medication(s) that are the same as other medications prescribed for you. Read the directions carefully, and ask your doctor or other care provider to review them with you. Take these medications as directed Dose & Instructions Dispensing Information Comments Morning Noon Evening Bedtime  
 dexamethasone 1 mg tablet Commonly known as:  DECADRON Your next dose is: Today, Tomorrow Other:  ____________ Refills:  0  
     
   
   
   
  
 lubiPROStone 8 mcg capsule Commonly known as:  Fernando Diaz Your next dose is: Today, Tomorrow Other:  ____________ Strength: 8 mcg; Form: ; SIG: take 1 tab by mouth 2 times a day for 30 days Quantity:  60 Cap Refills:  5  
     
   
   
   
  
 magnesium oxide 400 mg tablet Commonly known as:  MAG-OX Your next dose is: Today, Tomorrow Other:  ____________ Dose:  400 mg Take 400 mg by mouth. Refills:  0 Where to Get Your Medications Information about where to get these medications is not yet available ! Ask your nurse or doctor about these medications  
  oxyCODONE ER 15 mg ER tablet

## 2017-02-17 NOTE — PROGRESS NOTES
Mr. Aleah Benitez was approved to receive free Yasmine Salm from [facility]. [employer]'s patient assistance program can only ship a maximum of 2 vials (200mg max) of Keytruda every 3 weeks.

## 2017-02-17 NOTE — PROGRESS NOTES
Patient comfortable and resting with wife and daughter at home. Patient experiencing discomfort in right side. Patient still awaiting procedure.

## 2017-02-17 NOTE — H&P
Department of Interventional Radiology  [phone]    History and Physical    Patient:  Afsaneh Graves MRN:  [id number]  SSN:  [id number]    YOB: 1935  Age:  80 y.o. Sex:  male      Primary Care Provider:  Ash Montez MD  Referring Physician:  Chelsy Gómez MD    Date of Surgery Update:  Afsaneh Graves was seen and examined. History and physical has been reviewed. The patient has been examined.  There have been no significant clinical changes since the completion of the originally dated History and Physical.  Dr. Juan Ford H&P 2/3/17    Visit Vitals    /70 (BP 1 Location: Right arm, BP Patient Position: At rest)    Pulse 61    Temp 97.4 °F (36.3 °C)    Resp 17    Ht 6' 1\" (1.854 m)    Wt 72.6 kg (160 lb)    SpO2 98%    BMI 21.11 kg/m2     Drain evaluation, possible stent placement with Dr. Emanuel Mcgee, anesthesia    Signed By: Nilson Newton PA-C     February 17, 2017 2:35 PM

## 2017-02-17 NOTE — PROGRESS NOTES
TRANSFER - OUT REPORT:    Verbal report given to Wendy FIGUEROA on Levi Conte  being transferred to IR recovery (unit) for routine progression of care       Report consisted of patients Situation, Background, Assessment and   Recommendations(SBAR). Information from the following report(s) Procedure Summary and MAR was reviewed with the receiving nurse. Lines:   Venous Access Device  Upper chest (subclavicular area, right (Active)   Central Line Being Utilized Yes 2/17/2017 12:34 PM   Site Assessment Clean, dry, & intact 2/17/2017 12:34 PM   Date of Last Dressing Change 02/17/17 2/17/2017 12:34 PM   Dressing Status Clean, dry, & intact; New 2/17/2017 12:34 PM   Dressing Type Tape;Transparent 2/17/2017 12:34 PM   Date Accessed (Medial Site) 02/17/17 2/17/2017 12:34 PM   Access Time (Medial Site) 1234 2/17/2017 12:34 PM   Access Needle Size (Site #1) 20 G 2/17/2017 12:34 PM   Access Needle Length (Medial Site) 0.75 inches 2/17/2017 12:34 PM   Positive Blood Return (Medial Site) Yes 2/17/2017 12:34 PM   Action Taken (Medial Site) Flushed 2/17/2017 12:34 PM        Opportunity for questions and clarification was provided.       Patient transported with:   O2 @ 2 liters

## 2017-02-17 NOTE — PROGRESS NOTES
Interventional Radiology Prep Area Handoff      Allergies   Allergen Reactions    Demerol [Meperidine] Nausea and Vomiting    Megace [Megestrol] Rash    Morphine Other (comments)     Severe sweating     Tape [Adhesive] Other (comments)     Paper tape ok  - redness on skin        IRSummary reviewed. Pt identified with two identifiers. Verified procedure with Patient and IR Provider as Biliary stent placment. Consent obtained: Yes H&P complete/updated: Awaiting MD NAVA airway complete: W/ Anesthesia     Sedation:W/ Anesthesia    NPO: Yes   Anticoagulation: No     Pt shaved: N/A   Antibiotics: Cipro 400 mg  Anesthesia notified: Yes    Additional Notes: None      Lines:  Peripherally Inserted Central Catheter:     Central Venous Catheter:     Venous Access Device:  Venous Access Device  Upper chest (subclavicular area, right (Active)   Central Line Being Utilized Yes 2/17/2017 12:34 PM   Site Assessment Clean, dry, & intact 2/17/2017 12:34 PM   Date of Last Dressing Change 02/17/17 2/17/2017 12:34 PM   Dressing Status Clean, dry, & intact; New 2/17/2017 12:34 PM   Dressing Type Tape;Transparent 2/17/2017 12:34 PM   Date Accessed (Medial Site) 02/17/17 2/17/2017 12:34 PM   Access Time (Medial Site) 1234 2/17/2017 12:34 PM   Access Needle Size (Site #1) 20 G 2/17/2017 12:34 PM   Access Needle Length (Medial Site) 0.75 inches 2/17/2017 12:34 PM   Positive Blood Return (Medial Site) Yes 2/17/2017 12:34 PM   Action Taken (Medial Site) Flushed 2/17/2017 12:34 PM     Peripheral Intravenous Line:     Hemodialysis Catheter:     Drain(s):  Alli-Deluca Drain 10/21/16 (Active)       Biliary Drain Abdomen;Right (Active)       Drain abdominal drain  02/06/17 Right; Anterior Abdomen (Active)       Drain PTC drain 02/10/17 Right; Lower Abdomen (Active)       Labs verified: Yes  Recent Results (from the past 24 hour(s))   CBC WITH AUTOMATED DIFF    Collection Time: 02/17/17  1:10 PM   Result Value Ref Range    WBC 11.6 (H) 4.3 - 11.1 K/uL    RBC 3.06 (L) 4.23 - 5.67 M/uL    HGB 9.2 (L) 13.6 - 17.2 g/dL    HCT 28.8 (L) 41.1 - 50.3 %    MCV 94.1 79.6 - 97.8 FL    MCH 30.1 26.1 - 32.9 PG    MCHC 31.9 31.4 - 35.0 g/dL    RDW 19.1 (H) 11.9 - 14.6 %    PLATELET 754 (L) 886 - 450 K/uL    MPV 11.0 10.8 - 14.1 FL    DF AUTOMATED      NEUTROPHILS 87 (H) 43 - 78 %    LYMPHOCYTES 7 (L) 13 - 44 %    MONOCYTES 5 4.0 - 12.0 %    EOSINOPHILS 1 0.5 - 7.8 %    BASOPHILS 0 0.0 - 2.0 %    IMMATURE GRANULOCYTES 0.3 0.0 - 5.0 %    ABS. NEUTROPHILS 10.0 (H) 1.7 - 8.2 K/UL    ABS. LYMPHOCYTES 0.8 0.5 - 4.6 K/UL    ABS. MONOCYTES 0.6 0.1 - 1.3 K/UL    ABS. EOSINOPHILS 0.1 0.0 - 0.8 K/UL    ABS. BASOPHILS 0.0 0.0 - 0.2 K/UL    ABS. IMM. GRANS. 0.0 0.0 - 0.5 K/UL   METABOLIC PANEL, COMPREHENSIVE    Collection Time: 02/17/17  1:10 PM   Result Value Ref Range    Sodium 142 136 - 145 mmol/L    Potassium 4.1 3.5 - 5.1 mmol/L    Chloride 109 (H) 98 - 107 mmol/L    CO2 24 21 - 32 mmol/L    Anion gap 9 7 - 16 mmol/L    Glucose 85 65 - 100 mg/dL    BUN 35 (H) 8 - 23 MG/DL    Creatinine 0.94 0.8 - 1.5 MG/DL    GFR est AA >60 >60 ml/min/1.73m2    GFR est non-AA >60 >60 ml/min/1.73m2    Calcium 8.6 8.3 - 10.4 MG/DL    Bilirubin, total 1.6 (H) 0.2 - 1.1 MG/DL    ALT (SGPT) 24 12 - 65 U/L    AST (SGOT) 26 15 - 37 U/L    Alk.  phosphatase 540 (H) 50 - 136 U/L    Protein, total 6.2 (L) 6.3 - 8.2 g/dL    Albumin 1.7 (L) 3.2 - 4.6 g/dL    Globulin 4.5 (H) 2.3 - 3.5 g/dL    A-G Ratio 0.4 (L) 1.2 - 3.5     PROTHROMBIN TIME + INR    Collection Time: 02/17/17  1:10 PM   Result Value Ref Range    Prothrombin time 13.2 (H) 9.6 - 12.0 sec    INR 1.2 0.9 - 1.2     PTT    Collection Time: 02/17/17  1:10 PM   Result Value Ref Range    aPTT 32.8 (H) 23.5 - 31.7 SEC     Patient Vitals for the past 8 hrs:   Temp Pulse Resp BP SpO2   02/17/17 1236 97.4 °F (36.3 °C) - - - -   02/17/17 1234 - 61 17 157/70 98 %

## 2017-02-17 NOTE — PROGRESS NOTES
TRANSFER - OUT REPORT:    Verbal report given to CBS RN on Gi Ohms  being transferred to IR recovery (unit) for routine progression of care       Report consisted of patients Situation, Background, Assessment and   Recommendations(SBAR). Information from the following report(s) Procedure Summary and MAR was reviewed with the receiving nurse. Lines:   Venous Access Device  Upper chest (subclavicular area, right (Active)   Central Line Being Utilized Yes 2/17/2017 12:34 PM   Site Assessment Clean, dry, & intact 2/17/2017 12:34 PM   Date of Last Dressing Change 02/17/17 2/17/2017 12:34 PM   Dressing Status Clean, dry, & intact; New 2/17/2017 12:34 PM   Dressing Type Tape;Transparent 2/17/2017 12:34 PM   Date Accessed (Medial Site) 02/17/17 2/17/2017 12:34 PM   Access Time (Medial Site) 1234 2/17/2017 12:34 PM   Access Needle Size (Site #1) 20 G 2/17/2017 12:34 PM   Access Needle Length (Medial Site) 0.75 inches 2/17/2017 12:34 PM   Positive Blood Return (Medial Site) Yes 2/17/2017 12:34 PM   Action Taken (Medial Site) Flushed 2/17/2017 12:34 PM        Opportunity for questions and clarification was provided.       Patient transported with:   O2 @ 3 liters

## 2017-02-17 NOTE — IP AVS SNAPSHOT
[address] 
[phone] Patient: Rhett Sandoval MRN: [id number] UFI:1/60/[id number] You are allergic to the following Allergen Reactions Demerol (Meperidine) Nausea and Vomiting Megace (Megestrol) Rash Morphine Other (comments) Severe sweating Tape (Adhesive) Other (comments) Paper tape ok  - redness on skin Recent Documentation Height Weight BMI Smoking Status 1.854 m 72.6 kg 21.11 kg/m2 Former Smoker Emergency Contacts Name Discharge Info Relation Home Work Mobile Sophia Deluca DISCHARGE CAREGIVER [3] Spouse [3] [phone] Lori Soni  Daughter [21] [phone] About your hospitalization You were admitted on:  February 17, 2017 You last received care in the:  [address] You were discharged on:  February 18, 2017 Unit phone number:  [phone] Why you were hospitalized Your primary diagnosis was:  Not on File Your diagnoses also included:  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 
[address] [facility] [address] 
[phone] Amber Childs MD On 2/22/2017 Ulnpez-xp-sphjubetiqe as scheduled. [address] [facility] [address] 
[phone] Your Appointments Tuesday February 21, 2017 11:00 AM EST ROUTINE with CAROLINA [facility]dwell [facility] Wednesday February 22, 2017 12:25 PM EST  
LAB with [address]  
1808 [facility]) [place] [address] [facility]  
[phone] Wednesday February 22, 2017 12:30 PM EST Follow Up with PERICO Fry Hematology and Oncology [facility]) C/ Donny Maki [address] [facility] [address]  
[phone] Wednesday February 22, 2017  1:30 PM EST Follow Up with Gaby Monet MD  
[facility]) C/ Donny Maki [address] [facility] [address]  
[phone] Thursday February 23, 2017 10:00 AM EST  
ROUTINE with CAROLINA Caldwell [facility] Monday February 27, 2017  1:00 PM EST  
IR INJ CHOLANGIOGRAPHY PERC EXISTING ACCESS SI with SFD IR UNIT 1, SFD IR RADIOLOGIST RESOURCE, S[facility] IR ANES NOT REQUIRED  
D Radiology Specials ([address] [place] [address]) [address] [place] [address]  
[phone] OUTSIDE FILMS - Any outside films related to the study being scheduled should be brought with you on the day of the exam if available. MEDICATIONS - Patient must bring all medications currently taking to include prescriptions, over-the-counter meds, herbals, vitamins, and any dietary supplements. GENERAL INSTRUCTIONS - Must have someone to drive you home after the procedure, unless instructed otherwise by the Angio department. - For patients receiving Sedation: Patient must have nothing to eat or drink (NPO) after midnight.  - If Lab work is required: Encourage patient to have lab work completed PRIOR to arrival day of procedure. Tuesday February 28, 2017  2:00 PM EST  
ROUTINE with CAROLINA Donahue 931 [facility] (1 [facility]) [address] [place] [address] (1 [facility]) Tuesday March 07, 2017 12:15 PM EST  
LAB with [address]  
1808 [facility] OUTREACH INSURANCE [facility]) [place] [address] [facility]  
[phone] Tuesday March 07, 2017 12:45 PM EST PreChemo Follow Up with Carmelita Vora MD  
[facility] Hematology and Oncology [facility] C/ Donny Maki [address] [facility] [address]  
[phone] Tuesday March 07, 2017  1:15 PM EST Infusion with [facility]  
ST. [address] ([facility]) [address] Pravin Foster [phone] [facility] [address]  
[phone] [address] Wednesday March 08, 2017 To Be Determined SKILLLED NURSING DISCHARGE with CAROLINA Donahue 931 [facility] (1 [facility]) [address] [place] [address] (1 [facility]) Current Discharge Medication List  
  
START taking these medications Dose & Instructions Dispensing Information Comments Morning Noon Evening Bedtime  
 oxyCODONE ER 15 mg ER tablet Commonly known as:  OxyCONTIN Your next dose is: Today, Tomorrow Other:  _________ Dose:  15 mg Take 1 Tab by mouth every twelve (12) hours. Max Daily Amount: 30 mg.  
 Quantity:  60 Tab Refills:  0 CONTINUE these medications which have CHANGED Dose & Instructions Dispensing Information Comments Morning Noon Evening Bedtime  
 pembrolizumab 100 mg/4 mL (25 mg/mL) injection Commonly known as:  NHPPFIPA What changed:   
- how much to take 
- additional instructions Your next dose is: Today, Tomorrow Other:  _________ Dose:  10 mg/kg 28.84 mL by IntraVENous route every twenty-one (21) days. Quantity:  8 Vial  
Refills:  10  
     
   
   
   
  
  
CONTINUE these medications which have NOT CHANGED Dose & Instructions Dispensing Information Comments Morning Noon Evening Bedtime  
 amoxicillin-clavulanate 875-125 mg per tablet Commonly known as:  AUGMENTIN Your next dose is: Today, Tomorrow Other:  _________ Dose:  1 Tab Take 1 Tab by mouth every twelve (12) hours for 27 days. Quantity:  54 Tab Refills:  0  
     
   
   
   
  
 amylase-lipase-protease 24,000-76,000 -120,000 unit capsule Commonly known as:  CREON Your next dose is: Today, Tomorrow Other:  _________ Dose:  1 Cap Take 1 Cap by mouth three (3) times daily (with meals). Quantity:  270 Cap Refills:  3  
     
   
   
   
  
 atenolol 50 mg tablet Commonly known as:  TENORMIN Your next dose is: Today, Tomorrow Other:  _________ Dose:  50 mg Take 1 Tab by mouth every morning. Quantity:  90 Tab Refills:  3  
     
   
   
   
  
 dexamethasone 1 mg tablet Commonly known as:  DECADRON Your next dose is: Today, Tomorrow Other:  _________ Refills:  0  
     
   
   
   
  
 doxycycline 100 mg capsule Commonly known as:  VIBRAMYCIN Your next dose is: Today, Tomorrow Other:  _________ Dose:  100 mg Take 1 Cap by mouth every twelve (12) hours for 27 days. Quantity:  54 Cap Refills:  0  
     
   
   
   
  
 fluconazole 200 mg tablet Commonly known as:  DIFLUCAN Your next dose is: Today, Tomorrow Other:  _________ Dose:  400 mg Take 2 Tabs by mouth daily for 27 days. FDA advises cautious prescribing of oral fluconazole in pregnancy. Quantity:  54 Tab Refills:  0  
     
   
   
   
  
 * HYDROcodone-acetaminophen  mg tablet Commonly known as:  Wayna Glaze Your next dose is: Today, Tomorrow Other:  _________ Dose:  1 Tab Take 1 Tab by mouth every six (6) hours as needed for Pain. Max Daily Amount: 4 Tabs. Quantity:  120 Tab Refills:  0  
     
   
   
   
  
 * HYDROcodone-acetaminophen  mg tablet Commonly known as:  Wayna Glaze Your next dose is: Today, Tomorrow Other:  _________ Dose:  1 Tab Take 1 Tab by mouth every six (6) hours as needed for Pain. Max Daily Amount: 4 Tabs. Quantity:  60 Tab Refills:  0  
     
   
   
   
  
 lubiPROStone 8 mcg capsule Commonly known as:  Dennie Market Your next dose is: Today, Tomorrow Other:  _________ Strength: 8 mcg; Form: ; SIG: take 1 tab by mouth 2 times a day for 30 days Quantity:  60 Cap Refills:  5  
     
   
   
   
  
 magnesium oxide 400 mg tablet Commonly known as:  MAG-OX Your next dose is: Today, Tomorrow Other:  _________ Dose:  400 mg Take 400 mg by mouth. Refills:  0 MIRALAX 17 gram/dose powder Generic drug:  polyethylene glycol Your next dose is: Today, Tomorrow Other:  _________ Dose:  17 g Take 17 g by mouth daily. Refills:  0 Omeprazole delayed release 20 mg tablet Commonly known as:  PRILOSEC D/R Your next dose is: Today, Tomorrow Other:  _________ Dose:  20 mg Take 20 mg by mouth daily. Refills:  0  
     
   
   
   
  
 ondansetron hcl 8 mg tablet Commonly known as:  Westly Huitron Your next dose is: Today, Tomorrow Other:  _________ Dose:  8 mg Take 8 mg by mouth every eight (8) hours as needed for Nausea. Refills:  0 PROBIOTIC 4X 10-15 mg Tbec Generic drug:  B.infantis-B.ani-B.long-B.bifi Your next dose is: Today, Tomorrow Other:  _________ Dose:  1 Tab Take 1 Tab by mouth every morning. Refills:  0  
     
   
   
   
  
 senna-docusate 8.6-50 mg per tablet Commonly known as:  Dasha Bondutz Your next dose is: Today, Tomorrow Other:  _________ Dose:  1 Tab Take 1 Tab by mouth as needed. Refills:  0  
     
   
   
   
  
 zolpidem 5 mg tablet Commonly known as:  AMBIEN Your next dose is: Today, Tomorrow Other:  _________ Dose:  5 mg Take 1 Tab by mouth nightly as needed for Sleep. Max Daily Amount: 5 mg. Quantity:  30 Tab Refills:  2  
     
   
   
   
  
 * Notice: This list has 2 medication(s) that are the same as other medications prescribed for you. Read the directions carefully, and ask your doctor or other care provider to review them with you. Where to Get Your Medications Information on where to get these meds will be given to you by the nurse or doctor. ! Ask your nurse or doctor about these medications  
  oxyCODONE ER 15 mg ER tablet Discharge Instructions DISCHARGE SUMMARY from Nurse The following personal items are in your possession at time of discharge: 
 
Dental Appliances: None Visual Aid: None Home Medications: None Jewelry: None Clothing: None Other Valuables: None PATIENT INSTRUCTIONS: 
 
After general anesthesia or intravenous sedation, for 24 hours or while taking prescription Narcotics: · Limit your activities · Do not drive and operate hazardous machinery · Do not make important personal or business decisions · Do  not drink alcoholic beverages · If you have not urinated within 8 hours after discharge, please contact your surgeon on call. Report the following to your doctor: 
· Excessive pain, swelling, redness or odor of or around the surgical area · Temperature over 100.5 · Nausea and vomiting lasting longer than 4 hours or if unable to take medications · Pain unrelieved by medication. · Any questions What to do at Home: 
 
Recommended activity: as tolerated *  Please give a list of your current medications to your Primary Care Provider. *  Please update this list whenever your medications are discontinued, doses are 
    changed, or new medications (including over-the-counter products) are added. *  Please carry medication information at all times in case of emergency situations. These are general instructions for a healthy lifestyle: No smoking/ No tobacco products/ Avoid exposure to second hand smoke Surgeon General's Warning:  Quitting smoking now greatly reduces serious risk to your health. Obesity, smoking, and sedentary lifestyle greatly increases your risk for illness A healthy diet, regular physical exercise & weight monitoring are important for maintaining a healthy lifestyle You may be retaining fluid if you have a history of heart failure or if you experience any of the following symptoms:  Weight gain of 3 pounds or more overnight or 5 pounds in a week, increased swelling in our hands or feet or shortness of breath while lying flat in bed. Please call your doctor as soon as you notice any of these symptoms; do not wait until your next office visit. Recognize signs and symptoms of STROKE: 
 
F-face looks uneven A-arms unable to move or move unevenly S-speech slurred or non-existent T-time-call 911 as soon as signs and symptoms begin-DO NOT go Back to bed or wait to see if you get better-TIME IS BRAIN. Warning Signs of HEART ATTACK Call 911 if you have these symptoms: 
? Chest discomfort. Most heart attacks involve discomfort in the center of the chest that lasts more than a few minutes, or that goes away and comes back. It can feel like uncomfortable pressure, squeezing, fullness, or pain. ? Discomfort in other areas of the upper body. Symptoms can include pain or discomfort in one or both arms, the back, neck, jaw, or stomach. ? Shortness of breath with or without chest discomfort. ? Other signs may include breaking out in a cold sweat, nausea, or lightheadedness. Don't wait more than five minutes to call [address]! Fast action can save your life. Calling 911 is almost always the fastest way to get lifesaving treatment. Emergency Medical Services staff can begin treatment when they arrive  up to an hour sooner than if someone gets to the hospital by car. The discharge information has been reviewed with the {PATIENT PARENT GUARDIAN:88522}. The {PATIENT PARENT GUARDIAN:73942} verbalized understanding. Discharge medications reviewed with the {Dishcarge meds reviewed YXSE:64903} and appropriate educational materials and side effects teaching were provided. Discharge Orders None ACO Transitions of Care Introducing [employer] [address] Karen Sy offers a voluntary care coordination program to provide high quality service and care to [facility] fee-for-service beneficiaries. Redd Pugh was designed to help you enhance your health and well-being through the following services: ? Transitions of Care  support for individuals who are transitioning from one care setting to another (example: Hospital to home). ? Chronic and Complex Care Coordination  support for individuals and caregivers of those with serious or chronic illnesses or with more than one chronic (ongoing) condition and those who take a number of different medications. If you meet specific medical criteria, a [address] [place] [address] Rd may call you directly to coordinate your care with your primary care physician and your other care providers.  
 
For questions about the [facility] programs, please, contact your physicians office. For general questions or additional information about Accountable Care Organizations: 
Please visit www.medicare.gov/acos. html or call 1-800-MEDICARE (2-743.683.9286) TTY users should call 2-636.214.9214. Introducing [facility]! Dear Carmella Sampson: Thank you for requesting a [employer] account. Our records indicate that you have previously registered for a [employer] account but its currently inactive. Please call our [employer] support line at [phone]. Additional Information If you have questions, please visit the Frequently Asked Questions section of the [employer] website at [email or web address] Remember, [employer] is NOT to be used for urgent needs. For medical emergencies, dial 911. Now available from your iPhone and Android! General Information Please provide this summary of care documentation to your next provider. Patient Signature:  ____________________________________________________________ Date:  ____________________________________________________________  
  
[facility] Provider Signature:  ____________________________________________________________ Date:  ____________________________________________________________

## 2017-02-18 NOTE — PROGRESS NOTES
Met with pt at bedside, states current with [facility] for nsg, scheduled to come to home on Tuesday, resume order placed in cc and referral completed.

## 2017-02-18 NOTE — DISCHARGE INSTRUCTIONS
DISCHARGE SUMMARY from Nurse    The following personal items are in your possession at time of discharge:    Dental Appliances: None  Visual Aid: None     Home Medications: None  Jewelry: None  Clothing: None  Other Valuables: None             PATIENT INSTRUCTIONS:    After general anesthesia or intravenous sedation, for 24 hours or while taking prescription Narcotics:  · Limit your activities  · Do not drive and operate hazardous machinery  · Do not make important personal or business decisions  · Do  not drink alcoholic beverages  · If you have not urinated within 8 hours after discharge, please contact your surgeon on call. Report the following to your doctor:  · Excessive pain, swelling, redness or odor of or around the surgical area  · Temperature over 100.5  · Nausea and vomiting lasting longer than 4 hours or if unable to take medications  · Pain unrelieved by medication. · Any questions        What to do at Home:    Recommended activity: as tolerated      *  Please give a list of your current medications to your Primary Care Provider. *  Please update this list whenever your medications are discontinued, doses are      changed, or new medications (including over-the-counter products) are added. *  Please carry medication information at all times in case of emergency situations. These are general instructions for a healthy lifestyle:    No smoking/ No tobacco products/ Avoid exposure to second hand smoke    Surgeon General's Warning:  Quitting smoking now greatly reduces serious risk to your health.     Obesity, smoking, and sedentary lifestyle greatly increases your risk for illness    A healthy diet, regular physical exercise & weight monitoring are important for maintaining a healthy lifestyle    You may be retaining fluid if you have a history of heart failure or if you experience any of the following symptoms:  Weight gain of 3 pounds or more overnight or 5 pounds in a week, increased swelling in our hands or feet or shortness of breath while lying flat in bed. Please call your doctor as soon as you notice any of these symptoms; do not wait until your next office visit. Recognize signs and symptoms of STROKE:    F-face looks uneven    A-arms unable to move or move unevenly    S-speech slurred or non-existent    T-time-call 911 as soon as signs and symptoms begin-DO NOT go       Back to bed or wait to see if you get better-TIME IS BRAIN. Warning Signs of HEART ATTACK     Call 911 if you have these symptoms:   Chest discomfort. Most heart attacks involve discomfort in the center of the chest that lasts more than a few minutes, or that goes away and comes back. It can feel like uncomfortable pressure, squeezing, fullness, or pain.  Discomfort in other areas of the upper body. Symptoms can include pain or discomfort in one or both arms, the back, neck, jaw, or stomach.  Shortness of breath with or without chest discomfort.  Other signs may include breaking out in a cold sweat, nausea, or lightheadedness. Don't wait more than five minutes to call 911 - MINUTES MATTER! Fast action can save your life. Calling 911 is almost always the fastest way to get lifesaving treatment. Emergency Medical Services staff can begin treatment when they arrive -- up to an hour sooner than if someone gets to the hospital by car. The discharge information has been reviewed with the {PATIENT PARENT GUARDIAN:62906}. The {PATIENT PARENT GUARDIAN:94397} verbalized understanding. Discharge medications reviewed with the {Dishcarge meds reviewed UQRA:21341} and appropriate educational materials and side effects teaching were provided.

## 2017-02-18 NOTE — PROGRESS NOTES
Bedside and Verbal shift change report given to self (oncoming nurse) by Deirdre Smith RN (offgoing nurse). Report included the following information SBAR, Kardex, MAR and Recent Results. Patient just returned from IR. Bed alarm turned on for confusion.

## 2017-02-18 NOTE — DISCHARGE SUMMARY
[facility]: Inpatient Hematology / Oncology Discharge Summary Note    Patient ID:  Vimal Salas  [id number]  29 y.o.  1935    Admit Date: 2/17/2017    Discharge Date: 2/18/2017    Admission Diagnoses: abscess  Biliary obstruction due to cancer  Weakness  Intractable nausea and vomiting    Discharge Diagnoses:  Principal Diagnosis: <principal problem not specified>  Active Problems:    Biliary obstruction due to cancer (2/17/2017)      Weakness (2/17/2017)      Intractable nausea and vomiting (2/17/2017)      Hospital Course:  Mr Kira Lopes is a 81 yo male patient of Dr. Yue Dacosta with stage IV pancreatic cancer, started on 4th line Keytruda on 2/15. He was seen in IR for biliary drain and stent placements with Dr. Olga Mcgrath. Given length and difficulty of procedure, it was felt the patient need to be admitted overnight for close observation and pain control. He has been controlled with PO norco and was started on Oxycontin 15mg BID by Dr. Olga Mcgrath. He will continue his nausea medications PRN. Prior to discharge, he saturated the dressing to the capped drain on the left side of his abdomen with serous (very mildly pink-tinged) drainage which appears like ascitic fluid. Encouraged wife to manage with frequent dressing changes at home and social work was consulted for home health to assist.  She will call if fluid becomes bloody or bilious in appearance. He has an appointment with Dr. Yue Dacosta on Wednesday 2/22/17 for follow up and will call sooner with any new issues. He will resume the antibiotics he was taking prior to admission.       Consults:  None    Pertinent Diagnostic Studies:   Labs:    Recent Labs      02/18/17 0340 02/17/17 2028 02/17/17   1310  02/15/17   1425   WBC  11.7*  15.7*  11.6*  17.4*   HGB  8.0*  9.9*  9.2*  10.3*   PLT  105*  138*  106*  147*   ANEU   --   14.2*  10.0*  16.0*    Recent Labs      02/18/17   0340  02/17/17 2028 02/17/17   1310  02/15/17   1425   NA  145  146* 142  140   K  4.5  4.5  4.1  4.2   CL  112*  113*  109*  108*   CO2  24  23  24  22*   GLU  76  79  85  213*   BUN  37*  32*  35*  33*   CREA  1.00  0.99  0.94  1.19   CA  8.0*  8.8  8.6  8.2*   SGOT  40*  40*  26  31   AP  419*  553*  540*  708*   TP  5.3*  6.1*  6.2*  6.5   ALB  1.4*  1.7*  1.7*  2.0*   MG  2.0  1.9   --   2.0       Imaging:  NA    Current Discharge Medication List      START taking these medications    Details   oxyCODONE ER (OXYCONTIN) 15 mg ER tablet Take 1 Tab by mouth every twelve (12) hours. Max Daily Amount: 30 mg.  Qty: 60 Tab, Refills: 0         CONTINUE these medications which have NOT CHANGED    Details   amoxicillin-clavulanate (AUGMENTIN) 875-125 mg per tablet Take 1 Tab by mouth every twelve (12) hours for 27 days. Qty: 54 Tab, Refills: 0      doxycycline (VIBRAMYCIN) 100 mg capsule Take 1 Cap by mouth every twelve (12) hours for 27 days. Qty: 54 Cap, Refills: 0      fluconazole (DIFLUCAN) 200 mg tablet Take 2 Tabs by mouth daily for 27 days. FDA advises cautious prescribing of oral fluconazole in pregnancy. Qty: 54 Tab, Refills: 0      Omeprazole delayed release (PRILOSEC D/R) 20 mg tablet Take 20 mg by mouth daily. magnesium oxide (MAG-OX) 400 mg tablet Take 400 mg by mouth. dexamethasone (DECADRON) 1 mg tablet       lubiPROStone (AMITIZA) 8 mcg capsule Strength: 8 mcg; Form: ; SIG: take 1 tab by mouth 2 times a day for 30 days  Qty: 60 Cap, Refills: 5      zolpidem (AMBIEN) 5 mg tablet Take 1 Tab by mouth nightly as needed for Sleep. Max Daily Amount: 5 mg. Qty: 30 Tab, Refills: 2    Associated Diagnoses: Pancreatic cancer metastasized to liver ([address] Pain; Insomnia, unspecified type; Iron deficiency anemia, unspecified iron deficiency anemia type      !! HYDROcodone-acetaminophen (NORCO)  mg tablet Take 1 Tab by mouth every six (6) hours as needed for Pain. Max Daily Amount: 4 Tabs.   Qty: 120 Tab, Refills: 0      amylase-lipase-protease (CREON) 24,000-76,000 -120,000 unit capsule Take 1 Cap by mouth three (3) times daily (with meals). Qty: 270 Cap, Refills: 3    Associated Diagnoses: Pancreatic cancer metastasized to liver (HCC)      atenolol (TENORMIN) 50 mg tablet Take 1 Tab by mouth every morning. Qty: 90 Tab, Refills: 3    Associated Diagnoses: Pancreatic cancer metastasized to liver ([address] Pain; Insomnia, unspecified type      polyethylene glycol (MIRALAX) 17 gram/dose powder Take 17 g by mouth daily. senna-docusate (PERICOLACE) 8.6-50 mg per tablet Take 1 Tab by mouth as needed. B.infantis-B.ani-B.long-B.bifi (PROBIOTIC 4X) 10-15 mg TbEC Take 1 Tab by mouth every morning. Associated Diagnoses: Pancreatic cancer metastasized to liver (HCC)      ondansetron hcl (ZOFRAN) 8 mg tablet Take 8 mg by mouth every eight (8) hours as needed for Nausea. !! HYDROcodone-acetaminophen (NORCO)  mg tablet Take 1 Tab by mouth every six (6) hours as needed for Pain. Max Daily Amount: 4 Tabs. Qty: 60 Tab, Refills: 0      pembrolizumab (KEYTRUDA) 100 mg/4 mL (25 mg/mL) injection 28.84 mL by IntraVENous route every twenty-one (21) days. Qty: 8 Vial, Refills: 10       !! - Potential duplicate medications found. Please discuss with provider. OBJECTIVE:  Patient Vitals for the past 8 hrs:   BP Temp Pulse Resp SpO2   17 0909 121/55 97.4 °F (36.3 °C) (!) 58 18 97 %     Temp (24hrs), Av.6 °F (36.4 °C), Min:97.2 °F (36.2 °C), Max:98.1 °F (36.7 °C)     0701 -  1900  In: 2183 [I.V.:1246]  Out: -     Physical Exam:  Constitutional: Elderly appearing male in no acute distress, sitting comfortably on hospital bed   HEENT: Normocephalic and atraumatic. Oropharynx is clear, mucous membranes are moist.   Neck supple     Lymph node   Deferred   Skin Warm and dry. No bruising and no rash noted. No erythema.  +pallor   Respiratory Lungs are clear to auscultation bilaterally without wheezes, rales or rhonchi, normal air exchange without accessory muscle use. CVS Normal rate, regular rhythm and normal S1 and S2. No murmurs, gallops, or rubs. Abdomen 2 drains to right side of abdomen with bilious drainage in bags - dressing intact over site, 1 capped drain. Left drain capped with serous drainage noted to dressing. Soft, nontender and nondistended, normoactive bowel sounds. No palpable mass. Neuro Grossly nonfocal with no obvious sensory or motor deficits. MSK Normal range of motion in general.  No edema and no tenderness. Psych Appears fatigued       ASSESSMENT:    Active Problems:    Biliary obstruction due to cancer (2/17/2017)      Weakness (2/17/2017)      Intractable nausea and vomiting (2/17/2017)        DISPOSITION:  Follow-up Appointments   Procedures    FOLLOW UP VISIT Appointment in: 3 - 5 Days F/u Wednesday with Dr. Bedford Favre as scheduled     F/u Wednesday with Dr. Bedford Favre as scheduled     Standing Status:   Standing     Number of Occurrences:   1     Order Specific Question:   Appointment in     Answer:   3 - 5 Days     Over 34 minutes was spent in discharge planning and coordination of care. Madi Vaughn NP  [facility]  [address] [place] [address]  Office : [phone]  Fax : [phone]     Attending Addendum:  I personally evaluated the patient with Mahogany Dodd N.P.,  and agree with the assessment, findings and plan as documented. Appears stable, he is scheduled to follow up in our clinic next week.               Kofi Polk MD  [address] [place] [address]  Office : [phone]  Fax : [phone]

## 2017-02-18 NOTE — PROGRESS NOTES
Bedside and Verbal shift change report given to Donna Estrada RN (oncoming nurse) by self (offgoing nurse). Report included the following information SBAR, Kardex, MAR and Recent Results. Visualized drains with on coming RN. Bed alarm on for safety concerns.

## 2017-02-18 NOTE — PROGRESS NOTES
Port flushed and deaccessed per protocol. While  Discharge papers being reviewed patient noted his shirt was wet to left abdomen, where new drain placed, per wife. Dressing removed. Leakage noted from site, yellow pink drainage. Area redressed and FRANKLYN lopez notified.  Wife does not want to take patient home yet until speaks with NP or MD.

## 2017-02-18 NOTE — PROGRESS NOTES
Dressing around left drain changed prior to discharge with small amount leakage noted at this time. Spoke with  regarding supplies for home dressings.  Patient active  With [facility]

## 2017-02-18 NOTE — PROGRESS NOTES
Problem: Falls - Risk of  Goal: *Absence of falls  Outcome: Progressing Towards Goal  Pt progressing towards goal. No falls since admission. Bed low and locked. Call light within reach. Side rails x 2. Gripper socks applied. Personal belongings within reach. Pt verbalizes understanding to call for assistance but is confused as documented per assessment flowsheet. ARELY SCORE 4. BED ALARM ON FOR SAFETY CONCERNS.

## 2017-02-19 NOTE — Clinical Note
Admission f/u completed. Daughter was concerned about saturated pads. Instructed her to notify [facility] nurse.

## 2017-02-20 NOTE — PROGRESS NOTES
Transition of Care Discharge Follow-up Questionnaire   Date/Time of Call:   2/19/17 1:36p   What was the patient hospitalized for? Biliary obstruction due to cancer   Does the patient understand his/her diagnosis and/or treatment and what happened during the hospitalization? Patients caregiver verbalizes that she understands diagnosis and treatment during the hospitalization. Did the patient receive discharge instructions? Yes   Review any discharge instructions (see notes in [facility]). Ask patient if they understand these. Do they have any questions? She verbalized understanding of instructions. Were home services ordered (nursing, PT, OT, ST, etc.)? Yes, [facility]   If so, has the first visit occurred? If not, why? (Assist with coordination of services if necessary.) Services will continue Tuesday   Was any DME ordered? No    If so, has it been received? If not, why?  (Assist with coordination of arranging DME orders if necessary.) n/a    Complete a review of all medications (new, continued and discontinued meds per the D/C instructions and medication tab in [facility]). Patient is compliant with current medications. Were all new prescriptions filled? If not, why?  (Assist with obtainment of medications if necessary.) Yes     Does the patient understand the purpose and dosing instructions for all medications? (If patient has questions, provide explanation and education.) Yes   Does the patient have any problems in performing ADLs? (If patient is unable to perform ADLs  what is the limiting factor(s)? Do they have a support system that can assist? If no support system is present, discuss possible assistance that they may be able to obtain.) Patient needs assistance with mobility. Daughter indicates patient uses stand-by assistance with ADLs. She indicates patient is weak and has two drains on either side of his body. Daughter is educated on dressing changes.     Does the patient have all follow-up appointments scheduled? Has transportation been arranged? [facility] follow-up should be within 7 days of discharge; all others should have PCP follow-up within 7 days of discharge; follow-ups with other specialists as appropriate or ordered.) Patient has f/u appointment with Dr. Keiko Sellers. Any other questions or concerns expressed by the patient? Daughter has concerns about patients dressings. She states bandages were saturated last night. Bed sheets were covered in blood.  Care coordinator instructed caregiver to notify home health. She verbalized understanding of directions. Caregiver refuses to take patient to ED. Care coordinator will notify physician about current status. [facility] Call Completed By: Donavan Amin LPN  [facility] [address] Coordinator          This note will not be viewable in [address]

## 2017-02-21 PROBLEM — R62.51 FAILURE TO THRIVE (0-17): Status: ACTIVE | Noted: 2017-01-01

## 2017-02-21 NOTE — H&P
Inpatient Hematology / Oncology History and Physical    Reason for Asmission:  failure to thrive  Failure to thrive (0-17)    History of Present Illness:  Mr Pete Guerra is a 81 yo male patient of Dr. Gabino Ibrahim with stage IV pancreatic cancer, started on 4th line Keytruda on 2/15. He was scheduled to see Dr. Gabino Ibrahim tomorrow in clinic however last night at home, he became lethargic and fell twice. The family called [facility] and reported the situation and were advised to bring patient to clinic but as they were attempting to get patient ready to go, they decided that it would probably be best if patient came straight to hospital. He is directly admitted under our care for lethargy and failure to thrive. Oncology history copied from chart:   80 y.o.male stage IV pancreatic cancer s/p multiple lines of therapies, developed leukocytosis and chills on 2/3/17 and received biliary stent eval and complicated with massive bleeding and admitted. I have had extensive discussion with Dr. Twin Mcintyre, and discussed with pt and family regarding the eminent issues and [address]. We agreed to first aim at managing the liver abscess with IR drain, the necrotic cavity adjacent to duodenum with observation, consult ID for the abscess and colitis rx and prefer to have an outpt regimen to be given at home. We need to determine how the duodenum/CBD cavity affect his diet, for now start TPN transiently. We also discussed incurable cancer and further options of comfort only vs Keytruda, he desires to prolong life if possible, and [place] had good trial result as a novel immunotherapy in his setting and it should have low risk interrupting with above rx, tentatively to arrange for next week. Review of Systems:  Constitutional Denies fever, chills, + appetite changes, +fatigue   HEENT Denies trauma, blurry vision, hearing loss, ear pain, nosebleeds, sore throat, neck pain    Skin Denies lesions or rashes.    Lungs Denies dyspnea, cough, sputum production or hemoptysis. Cardiovascular Denies chest pain, palpitations, or lower extremity edema. Gastrointestinal Denies nausea, vomiting, changes in bowel habits, bloody or black stools    Denies dysuria, frequency or hesitancy of urination. Neuro Denies headaches, visual changes or ataxia. Hematology Denies easy bruising or bleeding, denies gingival bleeding or epistaxis. Endo Denies heat/cold intolerance, denies diabetes or thyroid abnormalities.    MSK Denies back pain, arthralgias, myalgias     Psychiatric/Behavioral Lethargic       Allergies   Allergen Reactions    Demerol [Meperidine] Nausea and Vomiting    Megace [Megestrol] Rash    Morphine Other (comments)     Severe sweating     Tape [Adhesive] Other (comments)     Paper tape ok  - redness on skin      Past Medical History   Diagnosis Date    Anemia      last blood transfusion 5-2016    GERD (gastroesophageal reflux disease)      controlled with med    Hypertension      managed with meds    Nausea & vomiting      some N/V, pt does well with antiemetic    Pancreatic cancer ([address]      oral chemo    Portacath in place      chest port in place for chemo    Prostate cancer ([address]      resulted in prostatectomy     Past Surgical History   Procedure Laterality Date    Hx prostatectomy      Hx appendectomy      Hx lap cholecystectomy      Hx hernia repair Right      R  hernia repair with mesh     Hx endoscopy       multiple ERCP with stent     Hx vascular access       chest port    Hx ercp  06/23/2016     stent change    Hx ercp  11/28/2016     5cm- 10 fr double pigtail    Hx cholecystectomy       Family History   Problem Relation Age of Onset    Stroke Father     Heart Attack Father     Heart Disease Father     No Known Problems Mother     No Known Problems Brother      Social History     Social History    Marital status:      Spouse name: N/A    Number of children: N/A    Years of education: N/A Occupational History    retired      15 years    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     Social History Main Topics    Smoking status: Former Smoker     Years: 4.00     Quit date: 1959    Smokeless tobacco: Never Used    Alcohol use 1.2 - 1.8 oz/week     2 - 3 Shots of liquor per week    Drug use: No    Sexual activity: Not on file     Other Topics Concern    Not on file     Social History Narrative     Current Facility-Administered Medications   Medication Dose Route Frequency Provider Last Rate Last Dose    0.9% sodium chloride infusion  100 mL/hr IntraVENous CONTINUOUS Cece Leisure,  mL/hr at 17 1225 100 mL/hr at 17 1225    amoxicillin-clavulanate (AUGMENTIN) 875-125 mg per tablet 1 Tab  1 Tab Oral Q12H Cece Leisure, NP        lipase-protease-amylase (ZENPEP 20,000) capsule 1 Cap  1 Cap Oral TID WITH MEALS Cece Leisure, NP        doxycycline (VIBRAMYCIN) capsule 100 mg  100 mg Oral Q12H Cece Leisure, NP        HYDROcodone-acetaminophen (NORCO)  mg tablet 1 Tab  1 Tab Oral Q6H PRN Cece Leisure, NP        lubiPROStone (AMITIZA) capsule 8 mcg (patient supplied)  8 mcg Oral BID Cece Leisure, NP        [START ON 2017] magnesium oxide (MAG-OX) tablet 400 mg  400 mg Oral DAILY Cece Leisure, NP        ondansetron (ZOFRAN ODT) tablet 8 mg  8 mg Oral Q8H PRN Cece Leisure, NP        [START ON 2017] polyethylene glycol (MIRALAX) packet 17 g  17 g Oral DAILY Cece Leisure, NP        [START ON 2017] senna-docusate (PERICOLACE) 8.6-50 mg per tablet 1 Tab  1 Tab Oral DAILY Cece Leisure, NP           OBJECTIVE:  Patient Vitals for the past 8 hrs:   BP Temp Pulse Resp SpO2 Weight   17 1123 - - - - - 157 lb (71.2 kg)   17 1118 145/56 97.7 °F (36.5 °C) (!) 38 18 98 % -     Temp (24hrs), Av.7 °F (36.5 °C), Min:97.7 °F (36.5 °C), Max:97.7 °F (36.5 °C)         Physical Exam:  Constitutional: Well developed, well nourished male in no acute distress, lying comfortably in the hospital bed. Family at bedside. HEENT: Normocephalic and atraumatic. Oropharynx is clear, mucous membranes are moist. Extraocular muscles are intact. Sclerae anicteric. Lymph node   Deferred   Skin Warm and dry. No bruising and no rash noted. No erythema. No pallor. Respiratory Lungs are clear to auscultation bilaterally without wheezes, rales or rhonchi, normal air exchange without accessory muscle use. CVS Normal rate, regular rhythm and normal S1 and S2. No murmurs, gallops, or rubs. Abdomen Soft, mildly tender and nondistended, normoactive bowel sounds. Neuro Lethargic   MSK Normal range of motion in general.  No edema and no tenderness. Psych Lethargic       Labs:    Recent Results (from the past 24 hour(s))   CBC WITH AUTOMATED DIFF    Collection Time: 02/21/17 12:15 PM   Result Value Ref Range    WBC 11.8 (H) 4.3 - 11.1 K/uL    RBC 2.95 (L) 4.23 - 5.67 M/uL    HGB 9.0 (L) 13.6 - 17.2 g/dL    HCT 28.2 (L) 41.1 - 50.3 %    MCV 95.6 79.6 - 97.8 FL    MCH 30.5 26.1 - 32.9 PG    MCHC 31.9 31.4 - 35.0 g/dL    RDW 18.5 (H) 11.9 - 14.6 %    PLATELET 820 (L) 398 - 450 K/uL    MPV 10.7 (L) 10.8 - 14.1 FL    DF AUTOMATED      NEUTROPHILS 83 (H) 43 - 78 %    LYMPHOCYTES 5 (L) 13 - 44 %    MONOCYTES 10 4.0 - 12.0 %    EOSINOPHILS 1 0.5 - 7.8 %    BASOPHILS 0 0.0 - 2.0 %    IMMATURE GRANULOCYTES 0.7 0.0 - 5.0 %    ABS. NEUTROPHILS 9.8 (H) 1.7 - 8.2 K/UL    ABS. LYMPHOCYTES 0.6 0.5 - 4.6 K/UL    ABS. MONOCYTES 1.2 0.1 - 1.3 K/UL    ABS. EOSINOPHILS 0.1 0.0 - 0.8 K/UL    ABS. BASOPHILS 0.0 0.0 - 0.2 K/UL    ABS. IMM.  GRANS. 0.1 0.0 - 0.5 K/UL   EKG, 12 LEAD, INITIAL    Collection Time: 02/21/17 12:20 PM   Result Value Ref Range    Systolic BP  mmHg    Diastolic BP  mmHg    Ventricular Rate 65 BPM    Atrial Rate 65 BPM    P-R Interval 190 ms    QRS Duration 108 ms    Q-T Interval 422 ms    QTC Calculation (Bezet) 438 ms    Calculated P Axis 42 degrees    Calculated R Axis 41 degrees    Calculated T Axis 61 degrees    Diagnosis       Sinus rhythm with occasional Premature ventricular complexes  Moderate voltage criteria for LVH, may be normal variant  ST abnormality, possible digitalis effect  Abnormal ECG  When compared with ECG of 14-AUG-2015 18:43,  Premature ventricular complexes are now Present  PA interval has decreased         Imaging:  [unfilled]    ASSESSMENT:  Problem List  Date Reviewed: 2/15/2017          Codes Class Noted    Failure to thrive (0-17) ICD-10-CM: R62.51  ICD-9-CM: 783.41  2/21/2017        Biliary obstruction due to cancer ICD-10-CM: K83.1  ICD-9-CM: 576.2  2/17/2017        Weakness ICD-10-CM: R53.1  ICD-9-CM: 780.79  2/17/2017        Intractable nausea and vomiting ICD-10-CM: R11.2  ICD-9-CM: 536.2  2/17/2017        Hepatic abscess ICD-10-CM: K75.0  ICD-9-CM: 572.0  2/5/2017        Gastrointestinal hemorrhage with hematemesis ICD-10-CM: K92.0  ICD-9-CM: 578.0  2/4/2017        GI bleed ICD-10-CM: K92.2  ICD-9-CM: 578.9  2/4/2017        Malignant neoplasm of head of pancreas ([facility] ICD-10-CM: C25.0  ICD-9-CM: 157.0  2/3/2017        Right upper quadrant abdominal pain ICD-10-CM: R10.11  ICD-9-CM: 789.01  8/23/2016        Lung nodules ICD-10-CM: R91.8  ICD-9-CM: 793.19  8/14/2015        Leukocytosis ICD-10-CM: D72.829  ICD-9-CM: 288.60  7/28/2015        Lung nodule ICD-10-CM: R91.1  ICD-9-CM: 793.11  7/28/2015    Overview Signed 7/28/2015  9:48 AM by Cristin Marx NP     Impression: Stable exam except to note a new 7 mm nodular density within the  right upper lung.  This could be further evaluated with CT as an outpatient             Elevated LFTs ICD-10-CM: R79.89  ICD-9-CM: 790.6  7/28/2015        GERD (gastroesophageal reflux disease) (Chronic) ICD-10-CM: K21.9  ICD-9-CM: 530.81  3/25/2015    Overview Signed 3/25/2015  9:37 AM by Stephanie Amezcua     controlled with med             Anemia (Chronic) ICD-10-CM: D64.9  ICD-9-CM: 285.9  3/25/2015        Hypertension (Chronic) ICD-10-CM: I10  ICD-9-CM: 401.9  3/25/2015    Overview Signed 3/25/2015  9:38 AM by Patric Aguilar     controlled with med             Thrombocytopenia, unspecified ([facility] ICD-10-CM: D69.6  ICD-9-CM: 287.5  10/28/2014        Chemotherapy induced neutropenia ([facility] ICD-10-CM: D70.1, T45.1X5A  ICD-9-CM: 288.03, E933.1  9/2/2014        Hyperbilirubinemia ICD-10-CM: E80.6  ICD-9-CM: 782.4  7/24/2014        Cancer ([facility] ICD-10-CM: C80.1  ICD-9-CM: 199.1  9/16/2013        Neuropathy ICD-10-CM: G62.9  ICD-9-CM: 355.9  7/17/2013    Overview Signed 7/17/2013 11:57 AM by Vance [address]     7-17-13 new onset will hold therapy             Hypomagnesemia ICD-10-CM: E83.42  ICD-9-CM: 275.2  7/31/2012                PLAN:    Failure to thrive  -Hydration, consult nutritionist    Lethargy/Confusion  -Blood cultures, urine cultures, CXR, CBC, CMP    Stage IV pancreatic cancer  -Consult palliative care    Continue home medications/antibiotics    Lab studies and imaging studies were personally reviewed. Pertinent old records were reviewed/              Shaina Sun NP   [address]  Office : [phone]  Fax : [phone]     Attending Addendum:  I personally evaluated the patient with Shaina Sun N.[email or web address].,  and agree with the assessment, findings and plan as documented. Mr. Yesika Shelton appears chronically ill and indicates that he has had intermittent hallucinations. He has been on Keytruda and has drains managing a hepatic abscess. He is lethargic and demonstrating a failure to thrive on current program. He will need to be hydrated initially and monitored for fever. His is on oral antibiotics but may be unable to take this medications orally in his current state. We will monitor situation and consult ID if his regimen needs to be changed to IV again. We will assess with hydration, but goals of care will need to be addressed as well.                Aidan Kee MD FACP    Oncology and Hematology   [facility]  [address] [place] [address]  P [phone]  F [phone]  [email or web address]

## 2017-02-21 NOTE — PROGRESS NOTES
Skin assessment completed by this RN and Disha Breaux RN. Patient has small abrasion on mid back, dry skin noted, edematous feet, capped drain and drainage bag on R side of body with gauze dressing (daughter changed today because of leaking), and another capped drain on L side (dressing not changed but no drainage noted). No other areas of abnormality.

## 2017-02-21 NOTE — PROGRESS NOTES
Admission database complete. Admission packet and medication side effect sheet given and reviewed with patient/family. Patient/family oriented to room and call system & denies needs at this time.

## 2017-02-21 NOTE — PROGRESS NOTES
Problem: Nutrition Deficit  Goal: *Optimize nutritional status  Nutrition  Reason for assessment: Consult for TPN management per nutritional support protocols Meliza Khan NP)  Assessment:   Diet order(s): Regular  Food/Nutrition History: Patient with h/o stage IV pancreatic cancer with mets to liver and lung, admitted with hepatic abscess and failure to thrive. Central line access : Port  Pertinent Medications: NS 100ml/hr, Zenpep, Zofran  Pertinent Labs: Na 142, Cl 109, C02 23. K 4.5, Mg 2.0, glucose 132 (no phos or triglyceride labs drawn today)  Anthropometrics:Height: 6' 1\" (185.4 cm), Weight Source: Patient stated, Weight: 71.3 kg (157 lb), Body mass index is 20.85 kg/(m^2). BMI class of underweight for age. Edema: 2+ pitting to BLE and ascites  Macronutrient needs:  EER: 7012-9621 kcal /day (25-30 kcal/kg listed BW)(86% IBW)  EPR:  grams protein/day (1-1.2 grams/kg IBW)(GFR >60)  Max CHO: 412 grams/day (4mg/kg ABW/min)  Fluid: 1ml/kcal  Intake/Comparative Standards: Current intake po intakes do not meet estimated needs     Nutrition Diagnosis: Inadequate oral intake r/t altered GI function and poor appetite as evidenced by patient with hepatic abscess, pancreatic cancer and diagnosis of failure to thrive  Intervention:   Meals and snacks: Regular  Nutritional Supplement Therapy: Electrolyte replacement per nutritional support protocols are active on MAR. PN/IVF:   1. Start TPN: 10%DEX/ 4.25%AA at 85 ml/hr with 250 ml 20% Lipids daily provides 1520 kcal/d (85% of needs), 85 grams of protein/d (100% of needs), 200 grams of CHO/d (does not exceed maximum CHO load) and 2250 ml of total volume/d ( 100% of needs). 1. Additives include: 50 mEq/L NaCl, 10 mEq/L KCl, 20 mEq/L KP04, 8 mEq/L Mg  2. DC IVF once TPN starts. 3. Add TPN labs, POC Glucoses/SSI if Glucose > 180 mg/dl on AM [address], [address] [place] [address], [phone]

## 2017-02-22 NOTE — CONSULTS
Palliative Care    Patient: Alberto Goff MRN: [id number]  SSN: [id number]    YOB: 1935  Age: 80 y.o. Sex: male       Date of Request: 2/21/2017  Date of Consult:  2/22/2017  Reason for Consult:  goals of care  Requesting Physician: Fernando Berkowitz NP     Assessment/Plan:     Principal Diagnosis:    Debility, Unspecified  R53.81    Additional Diagnoses:   · Delirium  F05  · Failure to Thrive  R62.7  · Fatigue, Lethargy  R53.83  · Frailty  R54  · Counseling, Encounter for Medical Advice  Z71.9  · Encounter for Palliative Care  Z51.5    Palliative Performance Scale (PPS):  PPS: 40    Medical Decision Making:   Reviewed and summarized notes from admission to present   Discussed case with appropriate providers  Reviewed laboratory and x-ray data from admission to present     Pt resting in bed, no distress noted. Wife, Lilibeth Cuevas, and daughter, Brenda Flores, at bedside. Pt is currently mildly confused. He denies pain or symptoms. Pt and family have spoken with Dr Wesley Caicedo this morning. Wife tearful about the conversation, stating they are trying to decide the next steps for pt. She is very concerned about his confusion, and wants this resolved before he goes home. Counseled on the causes and  Treatments for delirium. Risperdal has been added BID by Dr Wesley Caicedo. Provided support. Will continue to follow. Will discuss findings with members of the interdisciplinary team.      Thank you for this referral.   The Palliative Care team is available from 8 am to 4:30 pm Monday-Friday. Medical management during other hours is per the primary attending service.        .    Subjective:     History obtained from:  Patient, Family, Care Provider and Chart    Chief Complaint: pancreatic cancer, AMS  History of Present Illness:  Mr Mikie Cortes is an 81 yo  male with PMH of metastatic pancreatic cancer, HTN, GERD, and debility, who was directly admitted to [facility] on 2/21/2017 with c/o progressive fatigue, weakness, lethargy, and AMS over several days. He was recently started on 4th line Keytruda for metastatic pancreatic cancer. Family denied fever, n/v/d, cough. Work up revealed elevated WBC count. Pt currently confused, but denies symptoms. Advance Directive: uknown      Code Status:  Full Code            Health Care Power of : Unknown    Past Medical History:   Diagnosis Date    Anemia     last blood transfusion 5-2016    GERD (gastroesophageal reflux disease)     controlled with med    Hypertension     managed with meds    Nausea & vomiting     some N/V, pt does well with antiemetic    Pancreatic cancer ([address]     oral chemo    Portacath in place     chest port in place for chemo    Prostate cancer ([address]     resulted in prostatectomy      Past Surgical History:   Procedure Laterality Date    HX APPENDECTOMY      HX CHOLECYSTECTOMY      HX ENDOSCOPY      multiple ERCP with stent     HX ERCP  06/23/2016    stent change    HX ERCP  11/28/2016    5cm- 10 fr double pigtail    HX HERNIA REPAIR Right     R  hernia repair with mesh     HX LAP CHOLECYSTECTOMY      HX PROSTATECTOMY      HX VASCULAR ACCESS      chest port     Family History   Problem Relation Age of Onset    Stroke Father     Heart Attack Father     Heart Disease Father     No Known Problems Mother     No Known Problems Brother       Social History   Substance Use Topics    Smoking status: Former Smoker     Years: 4.00     Quit date: 1/1/1959    Smokeless tobacco: Never Used    Alcohol use 1.2 - 1.8 oz/week     2 - 3 Shots of liquor per week     Prior to Admission medications    Medication Sig Start Date End Date Taking? Authorizing Provider   oxyCODONE ER (OXYCONTIN) 15 mg ER tablet Take 1 Tab by mouth every twelve (12) hours. Max Daily Amount: 30 mg. 2/18/17   Rubia Mays MD   ondansetron [facility]) 8 mg tablet Take 8 mg by mouth every eight (8) hours as needed for Nausea.     Historical Provider   HYDROcodone-acetaminophen [facility])  mg tablet Take 1 Tab by mouth every six (6) hours as needed for Pain. Max Daily Amount: 4 Tabs. 2/11/17   Margarita Hess NP   amoxicillin-clavulanate (AUGMENTIN) 875-125 mg per tablet Take 1 Tab by mouth every twelve (12) hours for 27 days. 2/11/17 3/10/17  Margarita Hess NP   doxycycline (VIBRAMYCIN) 100 mg capsule Take 1 Cap by mouth every twelve (12) hours for 27 days. 2/11/17 3/10/17  Margarita Hess NP   fluconazole (DIFLUCAN) 200 mg tablet Take 2 Tabs by mouth daily for 27 days. FDA advises cautious prescribing of oral fluconazole in pregnancy. 2/11/17 3/10/17  Margarita Hess NP   Omeprazole delayed release (PRILOSEC D/R) 20 mg tablet Take 20 mg by mouth daily. Historical Provider   pembrolizumab (KEYTRUDA) 100 mg/4 mL (25 mg/mL) injection 28.84 mL by IntraVENous route every twenty-one (21) days. Patient taking differently: 10 mg/kg by IntraVENous route every twenty-one (21) days. Has not started 2/3/17   Laura De La Torre MD   magnesium oxide (MAG-OX) 400 mg tablet Take 400 mg by mouth. Historical Provider   dexamethasone (DECADRON) 1 mg tablet  1/13/17   Historical Provider   lubiPROStone (AMITIZA) 8 mcg capsule Strength: 8 mcg; Form: ; SIG: take 1 tab by mouth 2 times a day for 30 days 1/25/17   Aaron Mendez MD   zolpidem (AMBIEN) 5 mg tablet Take 1 Tab by mouth nightly as needed for Sleep. Max Daily Amount: 5 mg. 1/20/17   Laura De La Torre MD   HYDROcodone-acetaminophen [facility])  mg tablet Take 1 Tab by mouth every six (6) hours as needed for Pain. Max Daily Amount: 4 Tabs. 1/20/17   Laura De La Torre MD   amylase-lipase-protease (CREON) 24,000-76,000 -120,000 unit capsule Take 1 Cap by mouth three (3) times daily (with meals). 1/5/17   Laura De La Torre MD   atenolol (TENORMIN) 50 mg tablet Take 1 Tab by mouth every morning. 12/14/16   Laura De La Torre MD   polyethylene glycol (MIRALAX) 17 gram/dose powder Take 17 g by mouth daily.     Historical Provider   senna-docusate (PERICOLACE) 8.6-50 mg per tablet Take 1 Tab by mouth as needed. Historical Provider   B.infantis-B.ani-B.long-B.bifi (PROBIOTIC 4X) 10-15 mg TbEC Take 1 Tab by mouth every morning. Historical Provider       Allergies   Allergen Reactions    Demerol [Meperidine] Nausea and Vomiting    Megace [Megestrol] Rash    Morphine Other (comments)     Severe sweating     Tape [Adhesive] Other (comments)     Paper tape ok  - redness on skin         Review of Systems:  A comprehensive review of systems was negative except for:   Constitutional: Positive for fatigue. Objective:     Visit Vitals    /90 (BP 1 Location: Right arm, BP Patient Position: At rest)    Pulse (!) 59    Temp 97.4 °F (36.3 °C)    Resp 18    Wt 71.2 kg (157 lb)    SpO2 97%    BMI 20.71 kg/m2        Physical Exam:    General:  Cooperative. Debilitated and frail. No acute distress. Eyes:  Conjunctivae/corneas clear    Nose: Nares normal. Septum midline. Neck: Supple, symmetrical, trachea midline   Lungs:   Clear to auscultation bilaterally, unlabored   Heart:  Regular rate and rhythm   Abdomen:   Soft, non-tender, non-distended.  Drains in place   Extremities: Normal, atraumatic, no cyanosis or edema   Skin: Skin color, texture, turgor normal.   Neurologic: Nonfocal   Psych: Alert and oriented to person      Assessment:     Hospital Problems  Date Reviewed: 2/15/2017          Codes Class Noted POA    Failure to thrive (0-17) ICD-10-CM: R62.51  ICD-9-CM: 783.41  2/21/2017 Unknown              Signed By: Mahogany Edouard NP     February 22, 2017

## 2017-02-22 NOTE — PROGRESS NOTES
Daughter called me into room and said that drain on right side of abdomen which was connected to a drainage bag was leaking. Dressing was saturated and when dressing was removed the drain was holding by a suture but was no longer in abdomen. Pt was laying on tubing and bag and had been pulled out. Dressing was applied with gauze/tape and IR notified. Instructions given to dress site and IR (either the MD or NP) would be up to address when they were finished with their procedure in IR. Area was dressed and patient tolerated well.

## 2017-02-22 NOTE — PROGRESS NOTES
Problem: Nutrition Deficit  Goal: *Optimize nutritional status  Nutrition  Reason for assessment: TPN management Reg Oneill NP)  Assessment:   Diet order(s): Regular  Food/Nutrition History: Patient has mouth sores and requested magic mouthwash during RD assessment. Patient is also receiving pericolace. RN notes patient with 2+ edema to lower extremities today. Labs are remarkable for:  · Na stable at 142-Continue 50 mEq/L NaCl; Patient may benefit from Acetate tomorrow if C02 continues to trend down. C02 is at 22 today, Cl 110  · Glucose stable at 161 with initiation of 200 g CHO in TPN last night  · K trending down from 4.5 to 4.3; Continue 30 mEq/L potassium in TPN  · Phos 2.6-Continue 20 mEq/L KP04  · Mg stable at 2.0-Continue 8 mEq/L Mg  · Triglyceride 103  Anthropometrics:Height: 6' 1\" (185.4 cm), Weight Source: Patient stated, Weight: 71.3 kg (157 lb), Body mass index is 20.85 kg/(m^2). BMI class of underweight for age. Macronutrient needs:  EER: 5032-3188 kcal /day (25-30 kcal/kg listed BW)(86% IBW)  EPR:  grams protein/day (1-1.2 grams/kg IBW)(GFR >60)  Max CHO: 410 grams/day (4mg/kg ABW/min)  Fluid: 1ml/kcal     Intake/Comparative Standards: Current meal intake is inadequate in kcal/protein. Current TPN of 10%DEX/ 4.25%AA at 85 ml/hr with 250 ml 20% Lipids daily provides 1520 kcal/d (85% of needs), 85 grams of protein/d (100% of needs), 200 grams of CHO/d (does not exceed maximum CHO load) and 2250 ml of total volume/d ( 100% of needs). Nutrition Diagnosis: Inadequate oral intake r/t altered GI function and poor appetite as evidenced by patient with hepatic abscess, pancreatic cancer and diagnosis of failure to thrive  Intervention:   Meals and snacks: Regular; Ensure enlive TID  Nutritional Supplement Therapy: Electrolyte replacement per nutritional support protocols are active on MAR. PN/IVF:   1. D/C current TPN at 1759  2. Start new TPN of 2L of 5%AA/15%DEX at 1800 at 85ml/hr. This regimen to provide 1920 kcal (100% kcal needs), 100 g CHO (100% protein needs), 300 g CHO (does not exceed max CHO load), 2250 mL fluid (100% fluid needs). 3. Continue daily lipids. 4. Add TPN labs, POC Glucoses/SSI if Glucose > 180 mg/dl on AM BMP.      Magda Dubois, [employer], [address] [place] [address], [phone]

## 2017-02-22 NOTE — PROGRESS NOTES
Inpatient Hematology / Oncology Progress Note      Admission Date: 2017 10:44 AM  Reason for Admission/Hospital Course: failure to thrive  Failure to thrive (0-17)      24 Hour Events:  Delirium continues  Add Risperdal  Oriented to self    ROS:  Unable to obtain due to delirium    10 point review of systems is otherwise negative with the exception of the elements mentioned above in the HPI. Allergies   Allergen Reactions    Demerol [Meperidine] Nausea and Vomiting    Megace [Megestrol] Rash    Morphine Other (comments)     Severe sweating     Tape [Adhesive] Other (comments)     Paper tape ok  - redness on skin        OBJECTIVE:  Patient Vitals for the past 8 hrs:   BP Temp Pulse Resp SpO2   17 0720 149/90 97.4 °F (36.3 °C) (!) 59 18 97 %     Temp (24hrs), Av.1 °F (36.7 °C), Min:97.4 °F (36.3 °C), Max:98.5 °F (36.9 °C)         Physical Exam:  Constitutional: Well developed, well nourished male in no acute distress, lying comfortably in the hospital bed. HEENT: Normocephalic and atraumatic. Oropharynx is clear, mucous membranes are moist. Extraocular muscles are intact. Sclerae anicteric. Lymph node   Deferred   Skin Warm and dry. No bruising and no rash noted. No erythema. No pallor. Respiratory Lungs are clear to auscultation bilaterally without wheezes, rales or rhonchi, normal air exchange without accessory muscle use. CVS Normal rate, regular rhythm and normal S1 and S2. No murmurs, gallops, or rubs. Abdomen Soft, mildly tender and nondistended, normoactive bowel sounds. Neuro Oriented to self   MSK  No edema and no tenderness.    Psych Delirium         Labs:    Recent Labs      17   0415  17   1215   WBC  10.7  11.8*   RBC  2.90*  2.95*   HGB  8.7*  9.0*   HCT  27.5*  28.2*   MCV  94.8  95.6   MCH  30.0  30.5   MCHC  31.6  31.9   RDW  18.4*  18.5*   PLT  108*  141*   GRANS  89*  83*   LYMPH  4*  5*   MONOS  6  10   EOS  1  1   BASOS  0  0   IG  0.5 0.7   DF  AUTOMATED  AUTOMATED   ANEU  9.5*  9.8*   ABL  0.4*  0.6   ABM  0.7  1.2   MADISON  0.1  0.1   ABB  0.0  0.0   AIG  0.1  0.1      Recent Labs      02/22/17   0415  02/21/17   1215   NA  142  142   K  4.3  4.5   CL  110*  109*   CO2  22  23   AGAP  10  10   GLU  161*  132*   BUN  30*  34*   CREA  0.84  1.01   GFRAA  >60  >60   GFRNA  >60  >60   CA  7.9*  8.3   SGOT  39*  47*   AP  833*  927*   TP  5.6*  6.1*   ALB  1.4*  1.5*   GLOB  4.2*  4.6*   AGRAT  0.3*  0.3*   MG  2.0  2.0   PHOS  2.6   --          Imaging:      ASSESSMENT:    Problem List  Date Reviewed: 2/15/2017          Codes Class Noted    Failure to thrive (0-17) ICD-10-CM: R62.51  ICD-9-CM: 783.41  2/21/2017        Biliary obstruction due to cancer ICD-10-CM: K83.1  ICD-9-CM: 576.2  2/17/2017        Weakness ICD-10-CM: R53.1  ICD-9-CM: 780.79  2/17/2017        Intractable nausea and vomiting ICD-10-CM: R11.2  ICD-9-CM: 536.2  2/17/2017        Hepatic abscess ICD-10-CM: K75.0  ICD-9-CM: 572.0  2/5/2017        Gastrointestinal hemorrhage with hematemesis ICD-10-CM: K92.0  ICD-9-CM: 578.0  2/4/2017        GI bleed ICD-10-CM: K92.2  ICD-9-CM: 578.9  2/4/2017        Malignant neoplasm of head of pancreas (HCC) ICD-10-CM: C25.0  ICD-9-CM: 157.0  2/3/2017        Right upper quadrant abdominal pain ICD-10-CM: R10.11  ICD-9-CM: 789.01  8/23/2016        Lung nodules ICD-10-CM: R91.8  ICD-9-CM: 793.19  8/14/2015        Leukocytosis ICD-10-CM: D72.829  ICD-9-CM: 288.60  7/28/2015        Lung nodule ICD-10-CM: R91.1  ICD-9-CM: 793.11  7/28/2015    Overview Signed 7/28/2015  9:48 AM by Jaciel Kirby NP     Impression: Stable exam except to note a new 7 mm nodular density within the  right upper lung.  This could be further evaluated with CT as an outpatient             Elevated LFTs ICD-10-CM: R79.89  ICD-9-CM: 790.6  7/28/2015        GERD (gastroesophageal reflux disease) (Chronic) ICD-10-CM: K21.9  ICD-9-CM: 530.81  3/25/2015    Overview Signed 3/25/2015 9: 40 AM by Emma Arce     controlled with med             Anemia (Chronic) ICD-10-CM: D64.9  ICD-9-CM: 285.9  3/25/2015        Hypertension (Chronic) ICD-10-CM: I10  ICD-9-CM: 401.9  3/25/2015    Overview Signed 3/25/2015  9:38 AM by Emma Arce     controlled with med             Thrombocytopenia, unspecified ([facility] ICD-10-CM: D69.6  ICD-9-CM: 287.5  10/28/2014        Chemotherapy induced neutropenia ([facility] ICD-10-CM: D70.1, T45.1X5A  ICD-9-CM: 288.03, E933.1  9/2/2014        Hyperbilirubinemia ICD-10-CM: E80.6  ICD-9-CM: 782.4  7/24/2014        Cancer ([facility] ICD-10-CM: C80.1  ICD-9-CM: 199.1  9/16/2013        Neuropathy ICD-10-CM: G62.9  ICD-9-CM: 355.9  7/17/2013    Overview Signed 7/17/2013 11:57 AM by Dirk Navarrete     7-17-13 new onset will hold therapy             Hypomagnesemia ICD-10-CM: E83.42  ICD-9-CM: 275.2  7/31/2012                PLAN:  Failure to thrive  -Hydration, consult nutritionist  02/22 on TPN     Lethargy/Confusion  -Blood cultures, urine cultures, CXR, CBC, CMP  02/22 BCNTD. Add Risperdal     Stage IV pancreatic cancer  -Consult palliative care    Dr. Monica Reeves had conversation with wife regarding establishing goals of care.  Her primary focus currently is to improve his mental status and then perhaps they can discuss how to proceed with care.                    Eva Robles, FRANKLYN   [facility] [address]  Office : [phone]  Fax : [phone]

## 2017-02-22 NOTE — PROGRESS NOTES
END OF SHIFT NOTE:    Intake/Output  02/22 0701 - 02/22 1900  In: 2360 [P.O.:360; I.V.:2000]  Out: 2055 [Urine:50; Drains:5]   Voiding: YES  Catheter: NO  Drain:   Alli-Deluca Drain 10/21/16 (Active)       Biliary Drain Abdomen;Right (Active)   Site Assessment Clean, dry, & intact 2/22/2017  4:09 PM   Dressing Status Clean, dry, & intact 2/22/2017  4:09 PM   Drainage Description Clear 2/22/2017  2:15 AM   Status Other (comment) 2/22/2017  4:09 PM       Biliary Drain 02/17/17 Abdomen (Active)       Drain abdominal drain  02/06/17 Right; Anterior Abdomen (Active)   Site Assessment Clean, dry, & intact 2/22/2017 10:10 AM   Dressing Status Clean, dry, & intact 2/22/2017 10:10 AM   Status Other (comment) 2/22/2017  2:00 PM   Drainage Description Serous 2/22/2017  2:00 PM   Output (ml) 5 ml 2/22/2017 10:10 AM       Drain PTC drain 02/10/17 Right; Lower Abdomen (Active)               Stool:  1 occurrences. Emesis:  0 occurrences. VITAL SIGNS  Patient Vitals for the past 12 hrs:   Temp Pulse Resp BP SpO2   02/22/17 1100 98 °F (36.7 °C) 85 18 144/84 97 %   02/22/17 0720 97.4 °F (36.3 °C) (!) 59 18 149/90 97 %       Pain Assessment  Pain 1  Pain Scale 1: Visual (02/22/17 1605)  Pain Intensity 1: 0 (02/22/17 1605)  Patient Stated Pain Goal: 1 (02/21/17 1105)  Pain Reassessment 1: Patient sleeping (02/22/17 1605)  Pain Location 1: Shoulder (02/22/17 1448)  Pain Orientation 1: Right (02/21/17 1105)  Pain Description 1: Aching (02/22/17 1448)  Pain Intervention(s) 1: Medication (see MAR) (02/22/17 1448)    Ambulating  Yes    Additional Information: report given to Angel Constantino RN    Shift report given to oncoming nurse at the bedside.     Jakub Collazo RN

## 2017-02-22 NOTE — PROGRESS NOTES
END OF SHIFT NOTE:    Intake/Output  02/21 1901 - 02/22 0700  In: 1050 [I.V.:1050]  Out: -    Voiding: YES  Catheter: NO  Drain:   Alli-Deluca Drain 10/21/16 (Active)       Biliary Drain Abdomen;Right (Active)   Site Assessment Clean, dry, & intact 2/22/2017  2:15 AM   Dressing Status Clean, dry, & intact 2/22/2017  2:15 AM   Drainage Description Clear 2/22/2017  2:15 AM       Biliary Drain 02/17/17 Abdomen (Active)       Drain abdominal drain  02/06/17 Right; Anterior Abdomen (Active)   Site Assessment Clean, dry, & intact 2/22/2017  2:15 AM   Dressing Status Clean, dry, & intact 2/22/2017  2:15 AM       Drain PTC drain 02/10/17 Right; Lower Abdomen (Active)               Stool:  1 occurrences. Emesis:  0 occurrences. VITAL SIGNS  Patient Vitals for the past 12 hrs:   Temp Pulse Resp BP SpO2   02/22/17 0357 98.2 °F (36.8 °C) 77 18 161/67 97 %   02/21/17 2329 98.3 °F (36.8 °C) 69 18 153/74 97 %   02/21/17 1908 98.5 °F (36.9 °C) 71 18 127/63 98 %       Pain Assessment  Pain 1  Pain Scale 1: Numeric (0 - 10) (02/22/17 0215)  Pain Intensity 1: 0 (02/22/17 0215)  Patient Stated Pain Goal: 1 (02/21/17 1105)  Pain Location 1: Shoulder (02/21/17 1105)  Pain Orientation 1: Right (02/21/17 1105)  Pain Intervention(s) 1: Nurse notified (02/21/17 1105)    Ambulating  Yes    Additional Information: problems with the biliary drain leaking. Krysta Sung paged orders received. Shift report given to oncoming nurse at the bedside.     Vaughn Mathews

## 2017-02-22 NOTE — PROCEDURES
Department of Interventional Radiology  [phone]        Interventional Radiology Brief Procedure Note    Patient: Pantera Curtis MRN: [id number]  SSN: [id number]    YOB: 1935  Age: 80 y.o. Sex: male      Date of Procedure: 2/22/2017    Pre-Procedure Diagnosis: pancreatic cancer, ascites, abdominal distention    Post-Procedure Diagnosis: SAME    Procedure(s): Paracentesis    Brief Description of Procedure: US guided paracentesis, 2000 ml thin yellow fluid evacuated and discarded. Dermabond applied. Performed By: Sol Clemente PA-C     Assistants: None    Anesthesia:None    Estimated Blood Loss: None    Specimens: None    Implants: None    Findings: large volume ascites    Complications: None    Recommendations: abdominal drains to bulb. Ostomy appliances. Follow Up: prn.  Consider Pleurx if ascites recurs    Signed By: Sol Clemente PA-C     February 22, 2017

## 2017-02-23 NOTE — PROGRESS NOTES
Palliative Care Progress Note    Patient: Syeda Crenshaw MRN: [id number]  SSN: [id number]    YOB: 1935  Age: 80 y.o. Sex: male       Assessment/Plan:     Chief Complaint/Interval History: alert, pleasantly confused       Principal Diagnosis:    · Delirium  F05    Additional Diagnoses:   · Agitation  R45.1  · Debility, Unspecified  R53.81  · Failure to Thrive  R62.7  · Fatigue, Lethargy  R53.83  · Frailty  R54  · Pain, abdomen  R10.9  · Counseling, Encounter for Medical Advice  Z71.9  · Encounter for Palliative Care  Z51.5    Palliative Performance Scale (PPS)  PPS: 40    Medical Decision Making:   Reviewed and summarized notes over last 24 hours   Discussed case with appropriate providersRosey Lacey RN. Reviewed laboratory and x-ray data- CBC, CMP    Pt alert, resting in bed, pleasantly confused. Wife and daughter at bedside. They report pt had a bad night, with confusion and agitation. They have decided not to pursue MRI, and state they want to focus on his comfort. We discussed code status, and they request DNR status. Counseled on Hospice philosophy and locations of care. Advised that TPN could not be continued under hospice care- she voiced understanding. Wife requests hospice presentation- consult placed. Will provide Haldol 4 mg IV q 4 hours PRN, as well as IV Morphine. Will continue to follow. Will discuss findings with members of the interdisciplinary team.         More than 50% of this 35 minute visit was spent counseling and coordination of care as outlined above. Subjective:     Review of Systems:  A comprehensive review of systems was negative except for:   Constitutional: Positive for fatigue. Objective:     Visit Vitals    /78 (BP 1 Location: Right arm, BP Patient Position: At rest)    Pulse 77    Temp 97.8 °F (36.6 °C)    Resp 18    Wt 71.2 kg (157 lb)    SpO2 99%    BMI 20.71 kg/m2       Physical Exam:    General:  Cooperative. Debilitated and frail.  No acute distress. Eyes:  Conjunctivae/corneas clear    Nose: Nares normal. Septum midline. Neck: Supple, symmetrical, trachea midline   Lungs:   Clear to auscultation bilaterally, unlabored   Heart:  Regular rate and rhythm, no murmur    Abdomen:   Soft, non-tender, non-distended.  Drains    Extremities: Normal, atraumatic, no cyanosis or edema   Skin: Skin color, texture, turgor normal.   Neurologic: Nonfocal   Psych: Alert and confused      Signed By: Mayelin Fair NP     February 23, 2017            dnr

## 2017-02-23 NOTE — PROGRESS NOTES
Inpatient Hematology / Oncology Progress Note      Admission Date: 2017 10:44 AM  Reason for Admission/Hospital Course: failure to thrive  Failure to thrive (0-17)      24 Hour Events:  Hospice consulted    ROS: Appears to be comfortable. Family at bedside. 10 point review of systems is otherwise negative with the exception of the elements mentioned above in the HPI. Allergies   Allergen Reactions    Demerol [Meperidine] Nausea and Vomiting    Megace [Megestrol] Rash    Morphine Other (comments)     Severe sweating     Tape [Adhesive] Other (comments)     Paper tape ok  - redness on skin        OBJECTIVE:  Patient Vitals for the past 8 hrs:   BP Temp Pulse Resp SpO2 Height   17 0728 149/78 97.8 °F (36.6 °C) 77 18 99 % -     Temp (24hrs), Av.9 °F (36.6 °C), Min:97.6 °F (36.4 °C), Max:98.2 °F (36.8 °C)     0701 -  1900  In: 240 [P.O.:240]  Out: 1000 [Urine:100; Drains:900]    Physical Exam:  Constitutional: Well developed male in no acute distress, lying comfortably in the hospital bed. HEENT: Normocephalic and atraumatic. Oropharynx is clear, mucous membranes are moist. Extraocular muscles are intact. Sclerae anicteric. Lymph node   Deferred   Skin Warm and dry. No bruising and no rash noted. No erythema. No pallor. Respiratory  normal air exchange without accessory muscle use. CVS Normal rate, regular rhythm and normal S1 and S2. No murmurs, gallops, or rubs.    Psych Delirium         Labs:      Recent Labs      17   0400  17   0415  17   1215   WBC  6.0  10.7  11.8*   RBC  2.64*  2.90*  2.95*   HGB  7.8*  8.7*  9.0*   HCT  24.6*  27.5*  28.2*   MCV  93.2  94.8  95.6   MCH  29.5  30.0  30.5   MCHC  31.7  31.6  31.9   RDW  18.2*  18.4*  18.5*   PLT  74*  108*  141*   GRANS  85*  89*  83*   LYMPH  6*  4*  5*   MONOS  7  6  10   EOS  1  1  1   BASOS  0  0  0   IG  0.7  0.5  0.7   DF  AUTOMATED  AUTOMATED  AUTOMATED   ANEU  5.2  9.5*  9.8* ABL  0.3*  0.4*  0.6   ABM  0.4  0.7  1.2   MADISON  0.1  0.1  0.1   ABB  0.0  0.0  0.0   AIG  0.0  0.1  0.1        Recent Labs      02/23/17   0400  02/22/17   0415  02/21/17   1215   NA  143  142  142   K  4.2  4.3  4.5   CL  111*  110*  109*   CO2  21 22  23   AGAP  11  10  10   GLU  158*  161*  132*   BUN  31*  30*  34*   CREA  0.79*  0.84  1.01   GFRAA  >60  >60  >60   GFRNA  >60  >60  >60   CA  8.0*  7.9*  8.3   SGOT  28  39*  47*   AP  679*  833*  927*   TP  5.8*  5.6*  6.1*   ALB  2.2*  1.4*  1.5*   GLOB  3.6*  4.2*  4.6*   AGRAT  0.6*  0.3*  0.3*   MG  2.1  2.0  2.0   PHOS   --   2.6   --          Imaging:      ASSESSMENT:    Problem List  Date Reviewed: 2/15/2017          Codes Class Noted    * (Principal)Failure to thrive (0-17) ICD-10-CM: R62.51  ICD-9-CM: 783.41  2/21/2017        Biliary obstruction due to cancer ICD-10-CM: K83.1  ICD-9-CM: 576.2  2/17/2017        Weakness ICD-10-CM: R53.1  ICD-9-CM: 780.79  2/17/2017        Intractable nausea and vomiting ICD-10-CM: R11.2  ICD-9-CM: 536.2  2/17/2017        Hepatic abscess ICD-10-CM: K75.0  ICD-9-CM: 572.0  2/5/2017        Gastrointestinal hemorrhage with hematemesis ICD-10-CM: K92.0  ICD-9-CM: 578.0  2/4/2017        GI bleed ICD-10-CM: K92.2  ICD-9-CM: 578.9  2/4/2017        Malignant neoplasm of head of pancreas (HCC) ICD-10-CM: C25.0  ICD-9-CM: 157.0  2/3/2017        Right upper quadrant abdominal pain ICD-10-CM: R10.11  ICD-9-CM: 789.01  8/23/2016        Lung nodules ICD-10-CM: R91.8  ICD-9-CM: 793.19  8/14/2015        Leukocytosis ICD-10-CM: D72.829  ICD-9-CM: 288.60  7/28/2015        Lung nodule ICD-10-CM: R91.1  ICD-9-CM: 793.11  7/28/2015    Overview Signed 7/28/2015  9:48 AM by María Bruner NP     Impression: Stable exam except to note a new 7 mm nodular density within the  right upper lung.  This could be further evaluated with CT as an outpatient             Elevated LFTs ICD-10-CM: R79.89  ICD-9-CM: 790.6  7/28/2015        GERD (gastroesophageal reflux disease) (Chronic) ICD-10-CM: K21.9  ICD-9-CM: 530.81  3/25/2015    Overview Signed 3/25/2015  9:37 AM by Hakan Rosado     controlled with med             Anemia (Chronic) ICD-10-CM: D64.9  ICD-9-CM: 285.9  3/25/2015        Hypertension (Chronic) ICD-10-CM: I10  ICD-9-CM: 401.9  3/25/2015    Overview Signed 3/25/2015  9:38 AM by Hakan Rosado     controlled with med             Thrombocytopenia, unspecified ([facility] ICD-10-CM: D69.6  ICD-9-CM: 287.5  10/28/2014        Chemotherapy induced neutropenia ([facility] ICD-10-CM: D70.1, T45.1X5A  ICD-9-CM: 288.03, E933.1  9/2/2014        Hyperbilirubinemia ICD-10-CM: E80.6  ICD-9-CM: 782.4  7/24/2014        Cancer ([facility] ICD-10-CM: C80.1  ICD-9-CM: 199.1  9/16/2013        Neuropathy ICD-10-CM: G62.9  ICD-9-CM: 355.9  7/17/2013    Overview Signed 7/17/2013 11:57 AM by David Warner     7-17-13 new onset will hold therapy             Hypomagnesemia ICD-10-CM: F72.56  ICD-9-CM: 275.2  7/31/2012                Disposition:   Family has decided on comfort measures. Palliative Care is assisting and has consulted Hospice Care.  We will continue to follow patient and be available for any patient and or family needs.   [address]  Office : [phone]  Fax : [phone]

## 2017-02-23 NOTE — PROGRESS NOTES
Albumin administered. Approximately an hour later a total of 1300 ml was drained from the ostomy bag attached to the right side. 125 ml was drained from biliary drain.

## 2017-02-23 NOTE — PROGRESS NOTES
END OF SHIFT NOTE:    Intake/Output  02/23 0701 - 02/23 1900  In: 2040 [P.O.:240; I.V.:1800]  Out: 2400 [Urine:100; Drains:2300]   Voiding: YES  Catheter: NO  Drain:   Alli-Deluca Drain 10/21/16 (Active)       Biliary Drain Abdomen; Other (comment) (Active)   Site Assessment Clean, dry, & intact 2/23/2017  9:51 AM   Dressing Status Clean, dry, & intact 2/23/2017  9:51 AM   Drainage Description Green 2/23/2017  9:51 AM   Status Patent;Draining; To gravity 2/23/2017  9:51 AM   Output (ml) 300 ml 2/23/2017  2:09 PM       Biliary Drain 02/17/17 Abdomen (Active)       Drain abdominal drain  02/06/17 Right; Anterior Abdomen (Active)   Site Assessment Clean, dry, & intact 2/23/2017  9:51 AM   Dressing Status Clean, dry, & intact 2/23/2017  9:51 AM   Status Patent;Draining; To gravity 2/23/2017  9:51 AM   Drainage Description Yellow 2/23/2017  9:51 AM   Output (ml) 500 ml 2/23/2017  5:15 PM       Drain PTC drain 02/10/17 Right; Lower Abdomen (Active)               Stool:  1 occurrences. Emesis:  0 occurrences. VITAL SIGNS  Patient Vitals for the past 12 hrs:   Temp Pulse Resp BP SpO2   02/23/17 1451 99.1 °F (37.3 °C) 89 18 154/68 99 %   02/23/17 0728 97.8 °F (36.6 °C) 77 18 149/78 99 %       Pain Assessment  Pain 1  Pain Scale 1: Visual (02/23/17 1451)  Pain Intensity 1: 0 (02/23/17 0904)  Patient Stated Pain Goal: Unable to verbalize/indicatate pain (02/23/17 1409)  Pain Reassessment 1: Patient sleeping (02/23/17 1451)  Pain Onset 1: now (02/22/17 2043)  Pain Location 1: Generalized (02/23/17 1409)  Pain Orientation 1: Right (02/22/17 2043)  Pain Description 1: Aching (02/23/17 1409)  Pain Intervention(s) 1: Medication (see MAR) (02/23/17 1409)    Ambulating  No    Additional Information: report given to Ed SILVER report given to oncoming nurse at the bedside.     Farida Khan RN

## 2017-02-23 NOTE — DISCHARGE SUMMARY
[facility]: In Patient Hematology / Oncology Discharge Summary Note    Patient ID:  Pantera Curtis  [id number]  3 y.o.  1935    Admit Date: 2017    Discharge Date: 2017    Admission Diagnoses: failure to thrive  Failure to thrive (0-17)    Discharge Diagnoses:  Principal Diagnosis: Failure to thrive (0-17)      Hospital Course:  Mr Stephon Polo is a 79 yo male patient of Dr. Bassam Chamberlain with stage IV pancreatic cancer, started on 4th line Keytruda on 2/15. The family called [facility] regarding Mr. Stephon Polo becoming lethargic and falling at home on two occasion so they were advised to bring patient to clinic but as they were attempting to get patient ready to go, they decided that it would probably be best if patient came straight to hospital. He was directly admitted under our care for lethargy and failure to thrive. Labs were ordered including blood cultures which are negative to date. His medications were reviewed and some tapered down. His delirium continued despite of hydration and medication changes. Through his confusion, he was able to state that he could not do this any longer. According to his wife, he spoke with his son that is out of country and said his goodbyes. The family decided to make him comfort measures and he is being discharged to [facility].    Consults:  IP CONSULT TO PALLIATIVE CARE - PROVIDER    Significant Diagnostic Studies:     Allergies   Allergen Reactions    Demerol [Meperidine] Nausea and Vomiting    Megace [Megestrol] Rash    Morphine Other (comments)     Severe sweating     Tape [Adhesive] Other (comments)     Paper tape ok  - redness on skin        OBJECTIVE:  Patient Vitals for the past 8 hrs:   BP Temp Pulse Resp SpO2 Height   17 0728 149/78 97.8 °F (36.6 °C) 77 18 99 % -     Temp (24hrs), Av.9 °F (36.6 °C), Min:97.6 °F (36.4 °C), Max:98.2 °F (36.8 °C)     07 - 1900  In: 240 [P.O.:240]  Out: 1000 [Urine:100; Drains:900]    Physical Exam:  Skin Warm and dry. No bruising and no rash noted. No erythema. No pallor. Respiratory   No respiratory distress. Labs:  Recent Labs      02/23/17   0400  02/22/17   0415  02/21/17   1215   WBC  6.0  10.7  11.8*   RBC  2.64*  2.90*  2.95*   HGB  7.8*  8.7*  9.0*   HCT  24.6*  27.5*  28.2*   MCV  93.2  94.8  95.6   MCH  29.5  30.0  30.5   MCHC  31.7  31.6  31.9   RDW  18.2*  18.4*  18.5*   PLT  74*  108*  141*   GRANS  85*  89*  83*   LYMPH  6*  4*  5*   MONOS  7  6  10   EOS  1  1  1   BASOS  0  0  0   IG  0.7  0.5  0.7   DF  AUTOMATED  AUTOMATED  AUTOMATED   ANEU  5.2  9.5*  9.8*   ABL  0.3*  0.4*  0.6   ABM  0.4  0.7  1.2   MADISON  0.1  0.1  0.1   ABB  0.0  0.0  0.0   AIG  0.0  0.1  0.1    Recent Labs      02/23/17   0400  02/22/17   0415  02/21/17   1215   NA  143  142  142   K  4.2  4.3  4.5   CL  111*  110*  109*   CO2  21  22  23   AGAP  11  10  10   GLU  158*  161*  132*   BUN  31*  30*  34*   CREA  0.79*  0.84  1.01   GFRAA  >60  >60  >60   GFRNA  >60  >60  >60   CA  8.0*  7.9*  8.3   SGOT  28  39*  47*   AP  679*  833*  927*   TP  5.8*  5.6*  6.1*   ALB  2.2*  1.4*  1.5*   GLOB  3.6*  4.2*  4.6*   AGRAT  0.6*  0.3*  0.3*   MG  2.1  2.0  2.0   PHOS   --   2.6   --        ASSESSMENT:    Principal Problem:    Failure to thrive (0-17) (2/21/2017)      Current Discharge Medication List      START taking these medications    Details   haloperidol lactate (HALDOL) 5 mg/mL injection 0.8 mL by IntraMUSCular route every four (4) hours as needed. Qty: 1 Vial, Refills: 0      HYDROmorphone, PF, (DILAUDID) 1 mg/mL injection 0.5 mL by IntraVENous route every one (1) hour as needed. Max Daily Amount: 12 mg.  Qty: 5 mL, Refills: 0      LORazepam (ATIVAN) 2 mg/mL injection 0.5 mL by IntraVENous route every two (2) hours as needed.  Max Daily Amount: 12 mg.  Qty: 1 Vial, Refills: 0         STOP taking these medications       oxyCODONE ER (OXYCONTIN) 15 mg ER tablet Comments:   Reason for Stopping:         ondansetron hcl (ZOFRAN) 8 mg tablet Comments:   Reason for Stopping:         HYDROcodone-acetaminophen (NORCO)  mg tablet Comments:   Reason for Stopping:         amoxicillin-clavulanate (AUGMENTIN) 875-125 mg per tablet Comments:   Reason for Stopping:         doxycycline (VIBRAMYCIN) 100 mg capsule Comments:   Reason for Stopping:         fluconazole (DIFLUCAN) 200 mg tablet Comments:   Reason for Stopping:         Omeprazole delayed release (PRILOSEC D/R) 20 mg tablet Comments:   Reason for Stopping:         pembrolizumab (KEYTRUDA) 100 mg/4 mL (25 mg/mL) injection Comments:   Reason for Stopping:         magnesium oxide (MAG-OX) 400 mg tablet Comments:   Reason for Stopping:         dexamethasone (DECADRON) 1 mg tablet Comments:   Reason for Stopping:         lubiPROStone (AMITIZA) 8 mcg capsule Comments:   Reason for Stopping:         zolpidem (AMBIEN) 5 mg tablet Comments:   Reason for Stopping:         HYDROcodone-acetaminophen (NORCO)  mg tablet Comments:   Reason for Stopping:         amylase-lipase-protease (CREON) 24,000-76,000 -120,000 unit capsule Comments:   Reason for Stopping:         atenolol (TENORMIN) 50 mg tablet Comments:   Reason for Stopping:         polyethylene glycol (MIRALAX) 17 gram/dose powder Comments:   Reason for Stopping:         senna-docusate (PERICOLACE) 8.6-50 mg per tablet Comments:   Reason for Stopping:         B.infantis-B.ani-B.long-B.bifi (PROBIOTIC 4X) 10-15 mg TbEC Comments:   Reason for Stopping:             PLAN:      Follow-up Appointments   Procedures    FOLLOW UP VISIT Appointment in: Other (Specify) Patient being discharged to hospice. We will continue to be available to patient and or family as needed. Please don't hesitate to call. Patient being discharged to hospice. We will continue to be available to patient and or family as needed. Please don't hesitate to call.      Standing Status:   Standing     Number of Occurrences: 1     Order Specific Question:   Appointment in     Answer:    Other [id number])             Merly Kirkland NP    [facility], [address]  Office : [phone]  Fax : [phone]

## 2017-02-23 NOTE — HOSPICE
Met with family to present hospice services and philosophy. Family on board and feel that they would like to move to comfort care, however they need to have another discussion with their oncologist. I will follow up in the morning with family. Patient has been approved for [facility] if they do decide that this is the direction of care they would like to move to.   Thank you for this referral.  Colten Dela Cruz, [address] [facility], [place] Nurse Liaison  [phone]

## 2017-02-23 NOTE — PROGRESS NOTES
END OF SHIFT NOTE:    Intake/Output  02/22 1901 - 02/23 0700  In: 7013 [P.O.:120; I.V.:1261]  Out: 3615 [Urine:575; Drains:3040]   Voiding: YES  Catheter: NO  Drain:   Alli-Deluca Drain 10/21/16 (Active)       Biliary Drain Abdomen;Right (Active)   Site Assessment Clean, dry, & intact 2/23/2017  2:30 AM   Dressing Status Clean, dry, & intact 2/23/2017  2:30 AM   Drainage Description Clear 2/22/2017  2:15 AM   Status Other (comment) 2/22/2017  4:09 PM   Output (ml) 550 ml 2/23/2017  6:21 AM       Biliary Drain 02/17/17 Abdomen (Active)       Drain abdominal drain  02/06/17 Right; Anterior Abdomen (Active)   Site Assessment Clean, dry, & intact 2/23/2017  2:30 AM   Dressing Status Clean, dry, & intact 2/23/2017  2:30 AM   Status Other (comment) 2/22/2017  2:00 PM   Drainage Description Serous 2/22/2017  2:00 PM   Output (ml) 115 ml 2/23/2017  4:13 AM       Drain PTC drain 02/10/17 Right; Lower Abdomen (Active)               Stool:  0 occurrences. Emesis:  0 occurrences. VITAL SIGNS  Patient Vitals for the past 12 hrs:   Temp Pulse Resp BP SpO2   02/23/17 0413 97.6 °F (36.4 °C) 84 18 152/66 94 %   02/22/17 2347 98.2 °F (36.8 °C) 85 18 155/70 98 %   02/22/17 1937 98.1 °F (36.7 °C) 80 18 148/65 97 %       Pain Assessment  Pain 1  Pain Scale 1: Numeric (0 - 10) (02/23/17 0230)  Pain Intensity 1: 0 (02/23/17 0230)  Patient Stated Pain Goal: 1 (02/21/17 1105)  Pain Reassessment 1: Yes (02/22/17 2130)  Pain Onset 1: now (02/22/17 2043)  Pain Location 1: Shoulder (02/22/17 2043)  Pain Orientation 1: Right (02/22/17 2043)  Pain Description 1: Aching (02/22/17 2043)  Pain Intervention(s) 1: Medication (see MAR) (02/22/17 2043)    Ambulating  Yes    Additional Information: pt increasingly agitated throughout the night. Please consider having someone stay with him around the clock. He constantly tries to get out of bed. Miralax ordered for constipation.      Shift report given to oncoming nurse at the bedside.     Elena Juárez

## 2017-02-23 NOTE — PROGRESS NOTES
Problem: Nutrition Deficit  Goal: *Optimize nutritional status  Nutrition  Reason for assessment: TPN management Hector Joaquin NP)  Assessment:   Diet order(s): Regular  Food/Nutrition History: RD acknowledges patient and family are discussing hospice at this time. Per RN, plan to continue TPN until decisions are made. Patient had a recorded output of 5670 cc yesterday, 2 L from paracentesis and 3045 cc from drains. NS 100ml/hr was started today. Labs are remarkable for:  · Na trending up to 143, Cl 111, C02 21; remove NaCl from TPN tonight  · Glucose stable at 158  · K trending down to 4.2; Continue 30 mEq/L potassium in TPN  · Mg stable at 2.1-Continue 8 mEq/L Mg  Anthropometrics:Height: 6' 1\" (185.4 cm), Weight Source: Patient stated, Weight: 71.3 kg (157 lb), Body mass index is 20.85 kg/(m^2). BMI class of underweight for age. Macronutrient needs:  EER: 4454-4369 kcal /day (25-30 kcal/kg listed BW)(86% IBW)  EPR:  grams protein/day (1-1.2 grams/kg IBW)(GFR >60)  Max CHO: 410 grams/day (4mg/kg ABW/min)  Fluid: 1ml/kcal      Intake/Comparative Standards: Current meal intake is inadequate in kcal/protein. Current TPN provides 1920 kcal (100% kcal needs), 100 g CHO (100% protein needs), 300 g CHO (does not exceed max CHO load), 2250 mL fluid (100% fluid needs). Nutrition Diagnosis: Inadequate oral intake r/t altered GI function and poor appetite as evidenced by patient with hepatic abscess, pancreatic cancer and diagnosis of failure to thrive  Intervention:   Meals and snacks: Regular; Ensure enlive TID  Nutritional Supplement Therapy: Electrolyte replacement per nutritional support protocols are active on MAR. PN/IVF:   1. Continue TPN of 2L of 5%AA/15%DEX at 1800 at 85ml/hr. This regimen to provide 1920 kcal (100% kcal needs), 100 g CHO (100% protein needs), 300 g CHO (does not exceed max CHO load), 2250 mL fluid (100% fluid needs). 1. Remove NaCl from TPN  2.  Continue daily lipids. 3. Add TPN labs, POC Glucoses/SSI if Glucose > 180 mg/dl on AM BMP.       Minus Batsheva, [place], [address], [address] [place] [address], [phone]

## 2017-02-23 NOTE — PROGRESS NOTES
Pt increasingly more confused and trying to get out of bed. Pt family requested that all 4 side rails be used.

## 2017-02-24 PROBLEM — C25.9 PANCREATIC CANCER METASTASIZED TO LIVER (HCC): Status: ACTIVE | Noted: 2017-01-01

## 2017-02-24 PROBLEM — C78.7 PANCREATIC CANCER METASTASIZED TO LIVER (HCC): Status: ACTIVE | Noted: 2017-01-01

## 2017-02-24 NOTE — HOSPICE
Patient accepted for [address]. Family on board with hospice philosophy. Patient to transport at 1130 to room 117 at [address].  Thank you for this referral.  Gloria Moreau RN  [place] Nurse Liaison  [facility], [place]  [phone]

## 2017-02-24 NOTE — PROGRESS NOTES
Bedside and Verbal shift change report given to Mera Mcknight RN (oncoming nurse). Report included the following information SBAR, Kardex, MAR and Recent Results. TPN gtt rate verified with oncoming RN, caregiver at bedside.

## 2017-02-24 NOTE — PROGRESS NOTES
Christensen inserted with clear yellow output. Pt tolerated fairly but did attempt pulling at nurses and lines. Justine Erickson, RN in to help assist primary RN. Pt was medicated prior to insertion for furrowed brow and dyspnea. Biliary drains emptied with 50 mL dark brown and 175 mL yellow hazy output.

## 2017-02-24 NOTE — PROGRESS NOTES
Pt arrived via EMS. Two biliary drains present with dark brown output noted. Per Arthor Shirts, EMS, Pt obtained a skin tear on right forearm while being transported. Spouse and daughter arrived just shortly after. Assessment completed with Meet Diamond RN. Saturated brief upon arrival; changed and olsen to be inserted. Back with DTI and scattered ecchymosis plus small pinpoint \"scratches\" over trunk, chest, and back. Pt mottled on BLE. Not responsive.

## 2017-02-24 NOTE — PROGRESS NOTES
Problem: Nutrition Deficit  Goal: *Optimize nutritional status  Nutrition  Reason for assessment: TPN management Simona Dobbins NP)  Assessment:   Diet order(s): Regular  Food/Nutrition History: RD acknowledges patient and family are discussing hospice at this time. Patient had a recorded output of 4410 cc yesterday, 4310 cc from drains. NS 100ml/hr was started yesterday (MVI included). Labs are remarkable for:  · Na trending down to 140 today, along with Cl of 111 and C02 low at 20; remove chloride from TPN in form of KCl and switch to KAcetate  · Glucose stable at 162  · K trending up to 4.5 mEq/L today; Decrease potassium to 20 mEq/L  · Phosphorus 2.4 with 20 mEq/L KP04 in TPN; Increase to 30 mEq/L  · Mg stable at 2.1-Continue 8 mEq/L Mg  Anthropometrics:Height: 6' 1\" (185.4 cm), Weight Source: Patient stated, Weight: 71.3 kg (157 lb), Body mass index is 20.85 kg/(m^2). BMI class of underweight for age. Macronutrient needs:  EER: 0760-6145 kcal /day (25-30 kcal/kg listed BW)(86% IBW)  EPR:  grams protein/day (1-1.2 grams/kg IBW)(GFR >60)  Max CHO: 410 grams/day (4mg/kg ABW/min)  Fluid: 1ml/kcal      Intake/Comparative Standards: Current meal intake is inadequate in kcal/protein. Current TPN provides 1920 kcal (100% kcal needs), 100 g CHO (100% protein needs), 300 g CHO (does not exceed max CHO load), 2250 mL fluid (100% fluid needs). Nutrition Diagnosis: Inadequate oral intake r/t altered GI function and poor appetite as evidenced by patient with hepatic abscess, pancreatic cancer and diagnosis of failure to thrive  Intervention:   Meals and snacks: Regular; Ensure enlive TID  Nutritional Supplement Therapy: Electrolyte replacement per nutritional support protocols are active on MAR. PN/IVF:   1. Continue TPN of 2L of 5%AA/15%DEX at 1800 at 85ml/hr.  This regimen to provide 1920 kcal (100% kcal needs), 100 g CHO (100% protein needs), 300 g CHO (does not exceed max CHO load), 2250 mL fluid (100% fluid needs). 1. Adjust additives to include 30 mEq/L NaP04, 20 mEq/L KAcetate  2. Add TPN labs, POC Glucoses/SSI if Glucose > 180 mg/dl on AM BMP.       Too Anderson Perry [address], [address], [address] [place] [address], [phone]

## 2017-02-24 NOTE — H&P
History and Physical    Patient: Guy Pichardo MRN: [id number]  SSN: [id number]    YOB: 1935  Age: 80 y.o. Sex: male      Subjective:      Guy Pichardo is a 80 y.o. 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 In the past several weeks he has had some increased pain but most importantly decreased strength increasing weakness and increasing lethargy and confusion. He was recently found to have an hepatic or peripancreatic abscess presumably related related to one of his stent procedures and had excessive bleeding during an attempt to replace the stent. He has become progressively confused and unresponsive and has arrived at the point where he felt that he would forego further aggressive interventions. After an appropriate psychological struggle and counseling the family is also in agreement with this. He has had 2 peritoneal catheters placed for drainage of malignant ascites that has helped relieve some of the pressure to some degree. The remainder of his interval medical history consistent review is essentially unchanged from outlined in the medical record and reviewed with the family today.     Past Medical History:   Diagnosis Date    Anemia     last blood transfusion 5-2016    Failure to thrive (0-17) 2/21/2017    GERD (gastroesophageal reflux disease)     controlled with med    Hypertension     managed with meds    Nausea & vomiting     some N/V, pt does well with antiemetic    Pancreatic cancer ([address]     oral chemo    Portacath in place     chest port in place for chemo    Prostate cancer ([address]     resulted in prostatectomy     Past Surgical History:   Procedure Laterality Date    HX APPENDECTOMY      HX CHOLECYSTECTOMY      HX ENDOSCOPY      multiple ERCP with stent     HX ERCP  06/23/2016    stent change    HX ERCP  11/28/2016    5cm- 10 fr double pigtail    HX HERNIA REPAIR Right     R  hernia repair with mesh     HX LAP CHOLECYSTECTOMY      HX PROSTATECTOMY      HX VASCULAR ACCESS      chest port      Family History   Problem Relation Age of Onset    Stroke Father     Heart Attack Father     Heart Disease Father     No Known Problems Mother     No Known Problems Brother      Social History   Substance Use Topics    Smoking status: Former Smoker     Years: 4.00     Quit date: 1/1/1959    Smokeless tobacco: Never Used    Alcohol use 1.2 - 1.8 oz/week     2 - 3 Shots of liquor per week      Prior to Admission medications    Medication Sig Start Date End Date Taking? Authorizing Provider   haloperidol lactate (HALDOL) 5 mg/mL injection 0.8 mL by IntraMUSCular route every four (4) hours as needed. 2/24/17   Elise Holland MD   HYDROmorphone, PF, (DILAUDID) 1 mg/mL injection 0.5 mL by IntraVENous route every one (1) hour as needed. Max Daily Amount: 12 mg. 2/24/17   Elise Holland MD   LORazepam (ATIVAN) 2 mg/mL injection 0.5 mL by IntraVENous route every two (2) hours as needed. Max Daily Amount: 12 mg. 2/24/17   Elise Holland MD        Allergies   Allergen Reactions    Demerol [Meperidine] Nausea and Vomiting    Megace [Megestrol] Rash    Morphine Other (comments)     Severe sweating     Tape [Adhesive] Other (comments)     Paper tape ok  - redness on skin        Review of Systems: The remainder of the admission historical database is as outlined in the Clinical Record. Objective:     Vitals:    02/24/17 1447 02/24/17 1514   Temp: 97.5 °F (36.4 °C)    Height:  6' 1\" (1.854 m)        Physical Exam:     Vital signs are as outlined. Skin examination reveals no petechiae or spider angiomata. Head and neck examination revealed no jaundice Kwei mucosa no cervical adenopathy. Thoracic examination reveals mild congestion with no wheezes or respiratory distress.   Cardiovascular examination reveals regular rhythm at 80/m with a soft apical systolic murmur with no cardiac rub. Abdominal examination reveals 2 peritoneal catheters with a moderate degree of ascites and mild tenderness but no definite guarding or rebound and the fluid effluent is somewhat dark but relatively clear. Extremities reveal mild osteoarthritic changes and no acutely inflamed unstable joints. Peripheral vascular examination reveals no venous stasis changes or edema and no peripheral ischemic changes. Neurologic examinationbe unresponsive after intravenous medication with no lateralizing abnormalities.     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 ICD-10-CM: K83.1  ICD-9-CM: 576.2  2/17/2017 Yes        Hepatic abscess ICD-10-CM: K75.0  ICD-9-CM: 572.0  2/5/2017 Yes              Plan:     Current Facility-Administered Medications   Medication Dose Route Frequency    sodium chloride (NS) flush 10 mL  10 mL InterCATHeter PRN    heparin (porcine) pf 300 Units  300 Units InterCATHeter PRN    HYDROmorphone (DILAUDID) syringe 0.5 mg  0.5 mg SubCUTAneous Q30MIN PRN    Or    HYDROmorphone (DILAUDID) syringe 0.5 mg  0.5 mg IntraVENous Q30MIN PRN    acetaminophen (TYLENOL) tablet 650 mg  650 mg Oral Q4H PRN    acetaminophen (TYLENOL) tablet 650 mg  650 mg Oral Q4H PRN    acetaminophen (TYLENOL) suppository 650 mg  650 mg Rectal Q3H PRN    loperamide (IMODIUM) capsule 4 mg  4 mg Oral PRN    senna (SENOKOT) tablet 8.6 mg  1 Tab Oral BID    hyoscyamine (LEVSIN) 0.125mg/mL solution 250 mcg  250 mcg Oral Q4H PRN    glycopyrrolate (ROBINUL) injection 0.2 mg  0.2 mg SubCUTAneous Q4H PRN    LORazepam (ATIVAN) injection 2 mg  2 mg IntraVENous Q10MIN PRN    LORazepam (ATIVAN) injection 2 mg  2 mg IntraVENous Q2H PRN    Or    LORazepam (ATIVAN) tablet 2 mg  2 mg Oral Q4H PRN    haloperidol lactate (HALDOL) injection 2 mg  2 mg IntraVENous Q2H PRN    [START ON 2/25/2017] dexamethasone (DECADRON) 4 mg/mL injection 2 mg  2 mg IntraVENous DAILY       I visited and examined the patient last night in the hospital and visited with the family in their home since they had already left. I reviewed with them the basic concepts and care available at [place] [address]. I think they feel quite comfortable with the decision which I think is a perfectly appropriate decision. I suspect Helen Braswell will die comfortably within the next several weeks and probably sooner.       Signed By: Delisa Vivar MD     February 24, 2017

## 2017-02-24 NOTE — PROGRESS NOTES
TRANSFER - OUT REPORT:    Verbal report given to [facility] RN (name) on Kirke Standing  being transferred to [facility] (unit) for routine progression of care       Report consisted of patients Situation, Background, Assessment and   Recommendations(SBAR). Information from the following report(s) SBAR and MAR was reviewed with the receiving nurse. Lines:   Venous Access Device  Upper chest (subclavicular area, right (Active)   Central Line Being Utilized Yes 2/24/2017  7:20 AM   Criteria for Appropriate Use Irritant/vesicant medication 2/24/2017  7:20 AM   Site Assessment Clean, dry, & intact 2/24/2017  7:20 AM   Date of Last Dressing Change 02/21/17 2/24/2017  7:20 AM   Dressing Status Clean, dry, & intact 2/24/2017  7:20 AM   Dressing Type Disk with Chlorhexadine Gluconate (CHG); Transparent 2/24/2017  7:20 AM   Action Taken Blood drawn 2/24/2017  4:15 AM   Date Accessed (Medial Site) 02/21/17 2/23/2017  2:30 AM   Access Needle Size (Site #1) 20 G 2/21/2017 11:43 AM   Access Needle Length (Medial Site) 0.75 inches 2/21/2017 11:43 AM   Positive Blood Return (Medial Site) Yes 2/23/2017  2:30 AM   Action Taken (Medial Site) Blood drawn;Flushed; Infusing 2/24/2017  4:15 AM   Date Needle Changed (Medial Site) 02/21/17 2/23/2017  2:30 AM   Alcohol Cap Used No 2/23/2017  2:30 AM        Opportunity for questions and clarification was provided.       Patient transported with:   Monitor  Tech

## 2017-02-24 NOTE — PROGRESS NOTES
Bedside and Verbal shift change report received from Enrique Brock RN (offgoing nurse). Report included the following information SBAR, Kardex, MAR and Recent Results.

## 2017-02-24 NOTE — PROGRESS NOTES
Palliative Care Progress Note    Patient: Rhett Sandoval MRN: [id number]  SSN: [id number]    YOB: 1935  Age: 80 y.o. Sex: male       Assessment/Plan:     Chief Complaint/Interval History: sedated, mildly restless        Principal Diagnosis:    · Delirium  F05    Additional Diagnoses:   · Agitation  R45.1  · Debility, Unspecified  R53.81  · Failure to Thrive  R62.7  · Fatigue, Lethargy  R53.83  · Frailty  R54  · Pain, abdomen  R10.9  · Counseling, Encounter for Medical Advice  Z71.9  · Encounter for Palliative Care  Z51.5    Palliative Performance Scale (PPS)  PPS: 40    Medical Decision Making:   Reviewed and summarized notes over last 24 hours   Discussed case with appropriate providers- Dr Bety Jacinto       Pt sedated, slightly restless. Wife and daughter at bedside. Dr Bety Jacinto and Violette Tolliver, NP, at bedside as well. Family has decided to pursue hospice house admission. Updated Veronica Toure Liaison. Spent some talking with family about pt's life. They are very appreciative of the care pt has received. Provided support. Orders adjusted for comfort, as pt has been restless and agitated, and family wants to keep him calm. Will continue to follow. Will discuss findings with members of the interdisciplinary team.         More than 50% of this 35 minute visit was spent counseling and coordination of care as outlined above. Subjective:     Review of Systems:  A comprehensive review of systems was negative except for:   Constitutional: Positive for fatigue. Objective:     Visit Vitals    /80 (BP 1 Location: Right arm, BP Patient Position: At rest)    Pulse 89    Temp 97.7 °F (36.5 °C)    Resp 20    Wt 71.2 kg (157 lb)    SpO2 99%    BMI 20.71 kg/m2       Physical Exam:    General:  Cooperative. Debilitated and frail. No acute distress. Eyes:  Conjunctivae/corneas clear    Nose: Nares normal. Septum midline.    Neck: Supple, symmetrical, trachea midline   Lungs:   Clear to auscultation bilaterally, unlabored   Heart:  Regular rate and rhythm, no murmur    Abdomen:   Soft, non-tender, non-distended.  Drains    Extremities: Normal, atraumatic, no cyanosis or edema   Skin: Skin color, texture, turgor normal.   Neurologic: Nonfocal   Psych: Alert and confused      Signed By: Aroldo Felipe NP     February 24, 2017            dnr

## 2017-02-25 NOTE — PROGRESS NOTES
Progress Note    Patient: Griselda Hatcher MRN: [id number]  SSN: [id number]    YOB: 1935  Age: 80 y.o. Sex: male      Admit Date: 2/24/2017    LOS: 1 day     Clinical Summary:     [place] Boy is heavily sedated today but appears to be quite comfortable with no signs of pain or agitation. He is receiving fairly frequent but low doses of hydromorphone as well as lorazepam and occasional Haldol. His abdomen is flat and non distended and non tender. I asked him to squeeze my hand but I don't think he was able to respond. 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 I talked to Leander Hendrix extensively today. Think they're both  Much more comfortable with the situation and there were several days ago. I shared with them that this may not be quite as evident as I had originally thought since he does look better but still think that this will occur this week. Lori's brothers [place] doing from [place] this weekend. Medication changes were made.           Current Facility-Administered Medications   Medication Dose Route Frequency    sodium chloride (NS) flush 10 mL  10 mL InterCATHeter PRN    heparin (porcine) pf 300 Units  300 Units InterCATHeter PRN    HYDROmorphone (DILAUDID) syringe 0.5 mg  0.5 mg SubCUTAneous Q30MIN PRN    Or    HYDROmorphone (DILAUDID) syringe 0.5 mg  0.5 mg IntraVENous Q30MIN PRN    acetaminophen (TYLENOL) tablet 650 mg  650 mg Oral Q4H PRN    acetaminophen (TYLENOL) tablet 650 mg  650 mg Oral Q4H PRN    acetaminophen (TYLENOL) suppository 650 mg  650 mg Rectal Q3H PRN    loperamide (IMODIUM) capsule 4 mg  4 mg Oral PRN    senna (SENOKOT) tablet 8.6 mg  1 Tab Oral BID    hyoscyamine (LEVSIN) 0.125mg/mL solution 250 mcg  250 mcg Oral Q4H PRN    glycopyrrolate (ROBINUL) injection 0.2 mg  0.2 mg SubCUTAneous Q4H PRN    LORazepam (ATIVAN) injection 2 mg  2 mg IntraVENous Q10MIN PRN    LORazepam (ATIVAN) injection 2 mg  2 mg IntraVENous Q2H PRN    Or    LORazepam (ATIVAN) tablet 2 mg  2 mg Oral Q4H PRN    haloperidol lactate (HALDOL) injection 2 mg  2 mg IntraVENous Q2H PRN    dexamethasone (DECADRON) 4 mg/mL injection 2 mg  2 mg IntraVENous DAILY           Signed By: Libertad Dawson MD     February 25, 2017

## 2017-02-25 NOTE — PROGRESS NOTES
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 Keri Jolly  Duration of relationship: 53 years  Does the spouse/partner function as the primary caregiver? YES    Members of the household  Does the patient live alone: NO  Members of the household  Name Age Relationship Actively Involved in [address] Yes                             Notes    Family members/significant others not part of the household  Name Age Relationship Actively Involved in [address] [facility]  Daughter    [address] [facility]   Son                  Notes Georgette Estrada and [address] Mirna Fine live in [facility] but is scheduled to arrive today. Social Support System: Good social support system which includes two or less willing family members or friends    Abuse/Neglect (actual/potential risks): No history of abuse or neglect    Mental Status: Comatose; Responds to  Tactile stimuli  Does the patient or family have any concerns about the patient's mental status?  NO    Emotional Status  Patient: mood/affect stable and appropriate  Caregiver: mood/affect stable and appropriate    Significant Behavioral Changes Noted with members of the household other than the patient: none observed    Leisure time management/hobbies/interests: painting and other Collecting butterflies    Financial/Employment Status  Current employment status: retired   Has the patient's illness/condition forced a change in his/her employment status? NO    Has the patient's illness/condition forced a change in the employment status of the spouse or significant other? NO    Patient's annual income level: Greater than $40,001  Has the patient's income status changed as a result of health status: NO  Financial needs: NO  The patient needs the following services: Community resources  Handling finances: Total assistance unable to manage his/her own financial affairs  Financial concerns expressed by patient or spouse/significant other: None reported  Financial/employment notes: None    Goals/Plans  Goals of hospice care expressed by patient: Comfort measures only  Goals of hospice care expressed by spouse/significant other: Comfort measures only    Pain Management  Does the patient/family express or observed signs/symptoms of any pain/issues that need to be reported to the ? NO  If yes, what time was the  notified? End of Life Decisions/Planning: Advanced Directives  Status of plans for /final arrangements: Plans in progress  Plans/desires/requests for final arrangements/ communicated with: Family  End of life notes: Godfrey Grace  Remaining life goals: none    Survivor Risk Assessment  Indicate the actual and potential risks to the bereavement process: other (low risk )  Risk notes: Have good support of family and friends    Is spiritual well being essential to patient/caregiver? Asked and discussion occurred   Spiritual notes: Mary M[address] member of [address] [place], [place] [address]ian           Narrative : SW met with patient spouse and daughter at patient bedside to complete assessment and he is resting comfortably. 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 He has three children from his marriage and they all very supportive. They have four grandchildren. Patient enjoyed butterfly collection and drawing.   Patient spouse of [de-identified] three years is coping appropriately she verbalized they have been dealing with disease for five years and they are at peace with patient passing because of their spiritual beliefs. SW educated on hospice philosophy and they verbalized understanding and want comfort measures only. SW provided opportunity for family to tell tell their life stories and provided supportive counseling around anticipatory grief issues. SW will continue to assess ongoing and be available for Adult children that is coming from [facility].     Lurdes Cheng, JAKOBSW

## 2017-02-25 NOTE — PROGRESS NOTES
Received report from night shift RN. Patient sleeping. Identified by name and date of birth on armband. No s/sx pain, agitation, dyspnea, or N/V. Bed low and locked, side rails x2, tab alerts on, call light within reach, and door open for continuous monitoring. Updated wife via phone of patient's condition.

## 2017-02-25 NOTE — PROGRESS NOTES
Per chart review patient is admitted to hospice. Due to current hospice admission, patient is no longer eligible for [facility] program. Patient will be followed by hospice staff including RN and physician. This note will not be viewable in [address]

## 2017-02-25 NOTE — HOSPICE
Report given to [facility] - RHEA CHAVEZ, RN- Pt continues to rest comfortably, no agitation. Bed low/locked, door open.

## 2017-02-25 NOTE — PROGRESS NOTES
Patient having anxiety, trying to get out of bed, moaning, flailing. Flacc 6/10 . Prn dilaudid and prn ativan given.

## 2017-02-25 NOTE — PROGRESS NOTES
Report received from [place] [id number] at approx 1830 - pt resting in bed, sleeping with no visible signs of acute distress. AMISHA drain is intact draining approx 50cc of brown fluid to left draining AMISHA drain and <10cc to right AMISHA drain. Site of drain entry covered with colostomy bags - intact and no drainage around site. Christensen Cath intact draining urine light yellow. Pt is obtunded, nonresponsive to voice. Left chest port in place -[place] Dilaudid given at [facility] for dyspnea and restlessness. Again at [phone], pt became restless, agitated and dyspnic - Administered IV haldol and dilaudid - pt tolerated very well and is now resting comfortably. He does not appear to be in pain, nor shortness of breath.

## 2017-02-25 NOTE — PROGRESS NOTES
Summary Note- Patient is unresponsive. Required IV PRN medications for agitation and dyspnea. Mouth care only. Christensen to gravity; no bowel movement this shift. Abdominal drains clean, dry, intact, patent, and draining. Patient safety maintained through hourly rounding: bed low and locked, side rails x2, tab alerts on, call light within reach, and door open for continuous monitoring.

## 2017-02-26 NOTE — PROGRESS NOTES
Patient found unresponsive to verbal, tactile, or painful stimuli. Absent of apical pulse, blood pressure and respirations. Family at bedside. Tearful, yet, coping appropriately. Family expressed gratitude for care the patient received while at the hospice house.

## 2017-02-26 NOTE — PROGRESS NOTES
Progress Note    Patient: Jose Whiting MRN: [id number]  SSN: [id number]    YOB: 1935  Age: 80 y.o. Sex: male      Admit Date: 2/24/2017    LOS: 2 days     Clinical Summary:     Carmela Dickens is very comfortable though receiving moderate amounts of  Hydromorphone and Lorazepam.  He is unresponsive to hold my hand in my calling his name. His respiratory rate is slow and his heart rate is about 90. He appears quite comfortable. His symptoms are from [place] and the entire family is at bedside. We have had long discussion and it is possible they're questioning I have suggested that I think Carmela Dickens will pass within the next few hours to days. 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           Current Facility-Administered Medications   Medication Dose Route Frequency    LORazepam (ATIVAN) injection 1 mg  1 mg IntraVENous Q8H    HYDROmorphone (PF) (DILAUDID) injection 1 mg  1 mg IntraVENous Q30MIN PRN    sodium chloride (NS) flush 10 mL  10 mL InterCATHeter PRN    heparin (porcine) pf 300 Units  300 Units InterCATHeter PRN    acetaminophen (TYLENOL) tablet 650 mg  650 mg Oral Q4H PRN    acetaminophen (TYLENOL) tablet 650 mg  650 mg Oral Q4H PRN    acetaminophen (TYLENOL) suppository 650 mg  650 mg Rectal Q3H PRN    loperamide (IMODIUM) capsule 4 mg  4 mg Oral PRN    senna (SENOKOT) tablet 8.6 mg  1 Tab Oral BID    hyoscyamine (LEVSIN) 0.125mg/mL solution 250 mcg  250 mcg Oral Q4H PRN    glycopyrrolate (ROBINUL) injection 0.2 mg  0.2 mg SubCUTAneous Q4H PRN    LORazepam (ATIVAN) injection 2 mg  2 mg IntraVENous Q10MIN PRN    LORazepam (ATIVAN) injection 2 mg  2 mg IntraVENous Q2H PRN    Or    LORazepam (ATIVAN) tablet 2 mg  2 mg Oral Q4H PRN    haloperidol lactate (HALDOL) injection 2 mg  2 mg IntraVENous Q2H PRN    dexamethasone (DECADRON) 4 mg/mL injection 2 mg  2 mg IntraVENous DAILY           Signed By: Dena Emery MD     February 26, 2017

## 2017-02-26 NOTE — ROUTINE PROCESS
Pt non responsive. Facial grimace and slight moan noted. Resp 22pm on RA. Pt anxious when repositioning. Ativan 2mg and dilaudid 1mg ivp given. Christensen cath emptied of 400 cc cornell urine, right drain emptied of 250 cc of bloody drainage. Left drain emptied of 100 cc cornell drainage. Colostomy has no out put. Mottling beginning to show on bilateral knees. SR up x2. Bed low/locked. Call light with in reach. Door opened.

## 2017-02-26 NOTE — ROUTINE PROCESS
Pt non responsive. No distress. No facial grimace/moans/groans. No agitation/anxiety. Flacc =0-1. Resp irreg, non labored on RA. Pt repositioned. Routine ativan 1mg ivp given. No mottling noted at this time. SR up x2. Bed low/locked. Door opened. Will continue to monitor pt for medication needs and change in pt status.

## 2017-02-26 NOTE — PROGRESS NOTES
Received report from night shift RN. Patient is unresponsive to verbal/tactile stimuli. Identified by name and date of birth on armband. No s/sx pain, agitation, dyspnea, or N/V. Bed low and locked, side rails x2, tab alerts on, call light within reach, and door open for continuous monitoring.

## 2017-02-26 NOTE — ROUTINE PROCESS
Pt non responsive. No distress. No facial grimace/moans/groans. No agitation/anxiety. Flacc =0-1. Resp irreg, non labored on RA. No mottling noted at this time. SR up x2. Bed low/locked. Door opened. Will continue to monitor pt for medication needs and change in pt status.

## 2017-02-26 NOTE — ROUTINE PROCESS
Pt non responsive. No facial grimace. Flacc =0-1. Calm. No distress. Resp 20 pm on RA non labored. No mottling noted. Scheduled ativan 1mg ivp given. Port flushed well. Good blood return. No s/sx of infection noted at site. Dressing dry/intact. SR up x2. Bed low/locked. Call light with in reach. Door opened.

## 2017-02-26 NOTE — ROUTINE PROCESS
Pt non responsive. No distress. No facial grimace/moans/groans. No agitation/anxiety. Flacc =0-1. Resp irreg, non labored on RA. Pt repositioned. Routine ativan 1mg ivp given. SR up x2. Bed low/locked. Door opened. Will continue to monitor pt for medication needs and change in pt status.

## 2017-02-26 NOTE — ROUTINE PROCESS
Pt I'd by name and . Pt non responsive. Pt calm. No distress. No facial grimace. Flacc =0-1. Resp non labored on RA. Lungs diminished. BS diminished. HR irreg. No edema noted at this time. Christensen cath draining  cornell urine. No mottling noted at this time. SR up x2. Bed low/locked. Call light with in reach. Door opened. Tab alerts on.

## 2017-02-27 LAB
BACTERIA SPEC CULT: ABNORMAL
GRAM STN SPEC: ABNORMAL
SERVICE CMNT-IMP: ABNORMAL

## 2017-02-27 PROCEDURE — 3331090001 HH PPS REVENUE CREDIT

## 2017-02-27 PROCEDURE — 3331090002 HH PPS REVENUE DEBIT

## 2017-02-27 NOTE — [facility] IDG BEREAVEMENT NOTES
Patient death and family bereavement needs discussed at [facility]. Bereavement risk factors indicate a bereavement risk score of LOW . Bereavement follow up to be provided accordingly.

## 2017-02-28 PROCEDURE — 3331090001 HH PPS REVENUE CREDIT

## 2017-02-28 PROCEDURE — 3331090002 HH PPS REVENUE DEBIT

## 2017-03-01 PROCEDURE — 3331090001 HH PPS REVENUE CREDIT

## 2017-03-01 PROCEDURE — 3331090002 HH PPS REVENUE DEBIT

## 2017-03-02 PROCEDURE — 3331090002 HH PPS REVENUE DEBIT

## 2017-03-02 PROCEDURE — 3331090001 HH PPS REVENUE CREDIT

## 2017-03-03 ENCOUNTER — APPOINTMENT (OUTPATIENT)
Dept: INFUSION THERAPY | Age: 82
End: 2017-03-03

## 2017-03-03 PROCEDURE — 3331090002 HH PPS REVENUE DEBIT

## 2017-03-03 PROCEDURE — 3331090001 HH PPS REVENUE CREDIT

## 2017-03-04 PROCEDURE — 3331090001 HH PPS REVENUE CREDIT

## 2017-03-04 PROCEDURE — 3331090002 HH PPS REVENUE DEBIT

## 2017-03-05 PROCEDURE — 3331090002 HH PPS REVENUE DEBIT

## 2017-03-05 PROCEDURE — 3331090001 HH PPS REVENUE CREDIT

## 2017-03-06 PROCEDURE — 3331090001 HH PPS REVENUE CREDIT

## 2017-03-06 PROCEDURE — 3331090002 HH PPS REVENUE DEBIT

## 2017-03-07 ENCOUNTER — APPOINTMENT (OUTPATIENT)
Dept: INFUSION THERAPY | Age: 82
End: 2017-03-07

## 2017-03-07 PROCEDURE — 3331090002 HH PPS REVENUE DEBIT

## 2017-03-07 PROCEDURE — 3331090001 HH PPS REVENUE CREDIT

## 2017-03-08 PROCEDURE — 3331090001 HH PPS REVENUE CREDIT

## 2017-03-08 PROCEDURE — 3331090002 HH PPS REVENUE DEBIT

## 2017-03-09 PROCEDURE — 3331090001 HH PPS REVENUE CREDIT

## 2017-03-09 PROCEDURE — 3331090002 HH PPS REVENUE DEBIT

## 2017-03-10 LAB
FUNGUS CULTURE, RFCO2T: NEGATIVE
FUNGUS SMEAR, RFCO1T: NORMAL
FUNGUS SPEC CULT: NORMAL
FUNGUS STAIN, 188244: NORMAL
REFLEX TO ID, RFCO3T: NORMAL
SPECIMEN SOURCE: NORMAL
SPECIMEN SOURCE: NORMAL

## 2017-03-10 PROCEDURE — 3331090001 HH PPS REVENUE CREDIT

## 2017-03-10 PROCEDURE — 3331090002 HH PPS REVENUE DEBIT

## 2017-03-11 PROCEDURE — 3331090001 HH PPS REVENUE CREDIT

## 2017-03-11 PROCEDURE — 3331090002 HH PPS REVENUE DEBIT

## 2017-03-12 PROCEDURE — 3331090001 HH PPS REVENUE CREDIT

## 2017-03-12 PROCEDURE — 3331090002 HH PPS REVENUE DEBIT

## 2017-03-13 PROCEDURE — 3331090001 HH PPS REVENUE CREDIT

## 2017-03-13 PROCEDURE — 3331090002 HH PPS REVENUE DEBIT

## 2017-03-14 PROCEDURE — 3331090002 HH PPS REVENUE DEBIT

## 2017-03-14 PROCEDURE — 3331090001 HH PPS REVENUE CREDIT

## 2017-03-15 PROCEDURE — 3331090001 HH PPS REVENUE CREDIT

## 2017-03-15 PROCEDURE — 3331090002 HH PPS REVENUE DEBIT

## 2017-03-16 PROCEDURE — 3331090002 HH PPS REVENUE DEBIT

## 2017-03-16 PROCEDURE — 3331090001 HH PPS REVENUE CREDIT

## 2017-03-17 PROCEDURE — 3331090002 HH PPS REVENUE DEBIT

## 2017-03-17 PROCEDURE — 3331090001 HH PPS REVENUE CREDIT

## 2017-03-18 PROCEDURE — 3331090002 HH PPS REVENUE DEBIT

## 2017-03-18 PROCEDURE — 3331090001 HH PPS REVENUE CREDIT

## 2017-03-19 PROCEDURE — 3331090002 HH PPS REVENUE DEBIT

## 2017-03-19 PROCEDURE — 3331090001 HH PPS REVENUE CREDIT

## 2017-03-20 PROCEDURE — 3331090001 HH PPS REVENUE CREDIT

## 2017-03-20 PROCEDURE — 3331090002 HH PPS REVENUE DEBIT

## 2017-03-21 PROCEDURE — 3331090002 HH PPS REVENUE DEBIT

## 2017-03-21 PROCEDURE — 3331090001 HH PPS REVENUE CREDIT

## 2017-03-22 PROCEDURE — 3331090002 HH PPS REVENUE DEBIT

## 2017-03-22 PROCEDURE — 3331090001 HH PPS REVENUE CREDIT

## 2017-03-23 PROCEDURE — 3331090001 HH PPS REVENUE CREDIT

## 2017-03-23 PROCEDURE — 3331090002 HH PPS REVENUE DEBIT

## 2017-03-24 PROCEDURE — 3331090001 HH PPS REVENUE CREDIT

## 2017-03-24 PROCEDURE — 3331090002 HH PPS REVENUE DEBIT

## 2017-03-25 PROCEDURE — 3331090001 HH PPS REVENUE CREDIT

## 2017-03-25 PROCEDURE — 3331090002 HH PPS REVENUE DEBIT

## 2017-03-26 PROCEDURE — 3331090002 HH PPS REVENUE DEBIT

## 2017-03-26 PROCEDURE — 3331090001 HH PPS REVENUE CREDIT

## 2017-03-27 PROCEDURE — 3331090002 HH PPS REVENUE DEBIT

## 2017-03-27 PROCEDURE — 3331090001 HH PPS REVENUE CREDIT

## 2017-03-28 PROCEDURE — 3331090001 HH PPS REVENUE CREDIT

## 2017-03-28 PROCEDURE — 3331090002 HH PPS REVENUE DEBIT

## 2017-03-29 PROCEDURE — 3331090002 HH PPS REVENUE DEBIT

## 2017-03-29 PROCEDURE — 3331090001 HH PPS REVENUE CREDIT

## 2017-03-30 PROCEDURE — 3331090002 HH PPS REVENUE DEBIT

## 2017-03-30 PROCEDURE — 3331090001 HH PPS REVENUE CREDIT

## 2017-03-31 PROCEDURE — 3331090001 HH PPS REVENUE CREDIT

## 2017-03-31 PROCEDURE — 3331090002 HH PPS REVENUE DEBIT

## 2017-04-01 PROCEDURE — 3331090002 HH PPS REVENUE DEBIT

## 2017-04-01 PROCEDURE — 3331090001 HH PPS REVENUE CREDIT

## 2017-04-02 PROCEDURE — 3331090002 HH PPS REVENUE DEBIT

## 2017-04-02 PROCEDURE — 3331090001 HH PPS REVENUE CREDIT

## 2017-04-03 PROCEDURE — 3331090001 HH PPS REVENUE CREDIT

## 2017-04-03 PROCEDURE — 3331090002 HH PPS REVENUE DEBIT

## 2017-04-04 PROCEDURE — 3331090002 HH PPS REVENUE DEBIT

## 2017-04-04 PROCEDURE — 3331090001 HH PPS REVENUE CREDIT

## 2017-04-05 PROCEDURE — 3331090001 HH PPS REVENUE CREDIT

## 2017-04-05 PROCEDURE — 3331090002 HH PPS REVENUE DEBIT

## 2017-04-06 PROCEDURE — 3331090002 HH PPS REVENUE DEBIT

## 2017-04-06 PROCEDURE — 3331090001 HH PPS REVENUE CREDIT

## 2017-04-07 PROCEDURE — 3331090001 HH PPS REVENUE CREDIT

## 2017-04-07 PROCEDURE — 3331090002 HH PPS REVENUE DEBIT

## 2017-04-08 PROCEDURE — 3331090001 HH PPS REVENUE CREDIT

## 2017-04-08 PROCEDURE — 3331090002 HH PPS REVENUE DEBIT

## 2017-04-09 PROCEDURE — 3331090001 HH PPS REVENUE CREDIT

## 2017-04-09 PROCEDURE — 3331090002 HH PPS REVENUE DEBIT

## 2017-04-10 PROCEDURE — 3331090001 HH PPS REVENUE CREDIT

## 2017-04-10 PROCEDURE — 3331090002 HH PPS REVENUE DEBIT

## 2017-04-11 PROCEDURE — 3331090002 HH PPS REVENUE DEBIT

## 2017-04-11 PROCEDURE — 3331090001 HH PPS REVENUE CREDIT

## 2017-04-12 PROCEDURE — 3331090001 HH PPS REVENUE CREDIT

## 2017-04-12 PROCEDURE — 3331090002 HH PPS REVENUE DEBIT

## 2017-04-13 PROCEDURE — 3331090001 HH PPS REVENUE CREDIT

## 2017-04-13 PROCEDURE — 3331090003 HH PPS REVENUE ADJ

## 2017-04-13 PROCEDURE — 3331090002 HH PPS REVENUE DEBIT
